# Patient Record
Sex: MALE | Employment: OTHER | URBAN - METROPOLITAN AREA
[De-identification: names, ages, dates, MRNs, and addresses within clinical notes are randomized per-mention and may not be internally consistent; named-entity substitution may affect disease eponyms.]

---

## 2019-07-03 ENCOUNTER — HOSPITAL ENCOUNTER (OUTPATIENT)
Age: 84
Setting detail: SPECIMEN
Discharge: HOME | End: 2019-07-03

## 2019-07-03 LAB
ANION GAP SERPL CALCULATED.3IONS-SCNC: 11 MMOL/L (ref 9–17)
BUN BLDV-MCNC: 32 MG/DL (ref 8–23)
BUN/CREAT BLD: ABNORMAL (ref 9–20)
CALCIUM SERPL-MCNC: 8.2 MG/DL (ref 8.6–10.4)
CHLORIDE BLD-SCNC: 100 MMOL/L (ref 98–107)
CO2: 22 MMOL/L (ref 20–31)
CREAT SERPL-MCNC: 2.15 MG/DL (ref 0.7–1.2)
GFR AFRICAN AMERICAN: 36 ML/MIN
GFR NON-AFRICAN AMERICAN: 30 ML/MIN
GFR SERPL CREATININE-BSD FRML MDRD: ABNORMAL ML/MIN/{1.73_M2}
GFR SERPL CREATININE-BSD FRML MDRD: ABNORMAL ML/MIN/{1.73_M2}
GLUCOSE BLD-MCNC: 81 MG/DL (ref 70–99)
HCT VFR BLD CALC: 26.5 % (ref 40.7–50.3)
HEMOGLOBIN: 8.4 G/DL (ref 13–17)
MAGNESIUM: 2 MG/DL (ref 1.6–2.6)
MCH RBC QN AUTO: 30.1 PG (ref 25.2–33.5)
MCHC RBC AUTO-ENTMCNC: 31.7 G/DL (ref 28.4–34.8)
MCV RBC AUTO: 95 FL (ref 82.6–102.9)
NRBC AUTOMATED: 0 PER 100 WBC
PDW BLD-RTO: 15.7 % (ref 11.8–14.4)
PHOSPHORUS: 4.7 MG/DL (ref 2.5–4.5)
PLATELET # BLD: 311 K/UL (ref 138–453)
PMV BLD AUTO: 9 FL (ref 8.1–13.5)
POTASSIUM SERPL-SCNC: 5.1 MMOL/L (ref 3.7–5.3)
RBC # BLD: 2.79 M/UL (ref 4.21–5.77)
SODIUM BLD-SCNC: 133 MMOL/L (ref 135–144)
WBC # BLD: 5.7 K/UL (ref 3.5–11.3)

## 2019-07-03 PROCEDURE — 85027 COMPLETE CBC AUTOMATED: CPT

## 2019-07-03 PROCEDURE — 80048 BASIC METABOLIC PNL TOTAL CA: CPT

## 2019-07-03 PROCEDURE — 83735 ASSAY OF MAGNESIUM: CPT

## 2019-07-03 PROCEDURE — 84100 ASSAY OF PHOSPHORUS: CPT

## 2019-07-03 PROCEDURE — 36415 COLL VENOUS BLD VENIPUNCTURE: CPT

## 2019-07-03 PROCEDURE — P9603 ONE-WAY ALLOW PRORATED MILES: HCPCS

## 2019-07-17 ENCOUNTER — HOSPITAL ENCOUNTER (OUTPATIENT)
Age: 84
Setting detail: SPECIMEN
Discharge: HOME | End: 2019-07-17

## 2019-07-17 LAB
ANION GAP SERPL CALCULATED.3IONS-SCNC: 13 MMOL/L (ref 9–17)
BUN BLDV-MCNC: 15 MG/DL (ref 8–23)
BUN/CREAT BLD: ABNORMAL (ref 9–20)
CALCIUM SERPL-MCNC: 7.8 MG/DL (ref 8.6–10.4)
CHLORIDE BLD-SCNC: 99 MMOL/L (ref 98–107)
CO2: 28 MMOL/L (ref 20–31)
CREAT SERPL-MCNC: 1.47 MG/DL (ref 0.7–1.2)
GFR AFRICAN AMERICAN: 55 ML/MIN
GFR NON-AFRICAN AMERICAN: 46 ML/MIN
GFR SERPL CREATININE-BSD FRML MDRD: ABNORMAL ML/MIN/{1.73_M2}
GFR SERPL CREATININE-BSD FRML MDRD: ABNORMAL ML/MIN/{1.73_M2}
GLUCOSE BLD-MCNC: 89 MG/DL (ref 70–99)
HCT VFR BLD CALC: 24.8 % (ref 40.7–50.3)
HEMOGLOBIN: 7.5 G/DL (ref 13–17)
MCH RBC QN AUTO: 30.4 PG (ref 25.2–33.5)
MCHC RBC AUTO-ENTMCNC: 30.2 G/DL (ref 28.4–34.8)
MCV RBC AUTO: 100.4 FL (ref 82.6–102.9)
NRBC AUTOMATED: 0 PER 100 WBC
PDW BLD-RTO: 18.7 % (ref 11.8–14.4)
PLATELET # BLD: 164 K/UL (ref 138–453)
PMV BLD AUTO: 9.1 FL (ref 8.1–13.5)
POTASSIUM SERPL-SCNC: 3.9 MMOL/L (ref 3.7–5.3)
RBC # BLD: 2.47 M/UL (ref 4.21–5.77)
SODIUM BLD-SCNC: 140 MMOL/L (ref 135–144)
WBC # BLD: 5.2 K/UL (ref 3.5–11.3)

## 2019-07-17 PROCEDURE — P9603 ONE-WAY ALLOW PRORATED MILES: HCPCS

## 2019-07-17 PROCEDURE — 36415 COLL VENOUS BLD VENIPUNCTURE: CPT

## 2019-07-17 PROCEDURE — 80048 BASIC METABOLIC PNL TOTAL CA: CPT

## 2019-07-17 PROCEDURE — 85027 COMPLETE CBC AUTOMATED: CPT

## 2019-07-23 ENCOUNTER — HOSPITAL ENCOUNTER (OUTPATIENT)
Age: 84
Setting detail: SPECIMEN
Discharge: HOME | End: 2019-07-23

## 2019-07-23 LAB
ANION GAP SERPL CALCULATED.3IONS-SCNC: 8 MMOL/L (ref 9–17)
BUN BLDV-MCNC: 12 MG/DL (ref 8–23)
BUN/CREAT BLD: ABNORMAL (ref 9–20)
CALCIUM SERPL-MCNC: 7.7 MG/DL (ref 8.6–10.4)
CHLORIDE BLD-SCNC: 96 MMOL/L (ref 98–107)
CO2: 30 MMOL/L (ref 20–31)
CREAT SERPL-MCNC: 1.3 MG/DL (ref 0.7–1.2)
GFR AFRICAN AMERICAN: >60 ML/MIN
GFR NON-AFRICAN AMERICAN: 53 ML/MIN
GFR SERPL CREATININE-BSD FRML MDRD: ABNORMAL ML/MIN/{1.73_M2}
GFR SERPL CREATININE-BSD FRML MDRD: ABNORMAL ML/MIN/{1.73_M2}
GLUCOSE BLD-MCNC: 76 MG/DL (ref 70–99)
HCT VFR BLD CALC: 25.1 % (ref 40.7–50.3)
HEMOGLOBIN: 7.4 G/DL (ref 13–17)
MAGNESIUM: 1.3 MG/DL (ref 1.6–2.6)
MCH RBC QN AUTO: 30.3 PG (ref 25.2–33.5)
MCHC RBC AUTO-ENTMCNC: 29.5 G/DL (ref 28.4–34.8)
MCV RBC AUTO: 102.9 FL (ref 82.6–102.9)
NRBC AUTOMATED: 0 PER 100 WBC
PDW BLD-RTO: 18.5 % (ref 11.8–14.4)
PLATELET # BLD: 136 K/UL (ref 138–453)
PMV BLD AUTO: 9.8 FL (ref 8.1–13.5)
POTASSIUM SERPL-SCNC: 3.7 MMOL/L (ref 3.7–5.3)
RBC # BLD: 2.44 M/UL (ref 4.21–5.77)
SODIUM BLD-SCNC: 134 MMOL/L (ref 135–144)
WBC # BLD: 3.6 K/UL (ref 3.5–11.3)

## 2019-07-23 PROCEDURE — 85027 COMPLETE CBC AUTOMATED: CPT

## 2019-07-23 PROCEDURE — 36415 COLL VENOUS BLD VENIPUNCTURE: CPT

## 2019-07-23 PROCEDURE — 83735 ASSAY OF MAGNESIUM: CPT

## 2019-07-23 PROCEDURE — 80048 BASIC METABOLIC PNL TOTAL CA: CPT

## 2019-07-23 PROCEDURE — P9603 ONE-WAY ALLOW PRORATED MILES: HCPCS

## 2019-07-27 PROCEDURE — 87086 URINE CULTURE/COLONY COUNT: CPT

## 2019-07-27 PROCEDURE — 81001 URINALYSIS AUTO W/SCOPE: CPT

## 2019-07-28 ENCOUNTER — HOSPITAL ENCOUNTER (OUTPATIENT)
Age: 84
Setting detail: SPECIMEN
Discharge: HOME | End: 2019-07-28

## 2019-07-28 LAB
-: NORMAL
AMORPHOUS: NORMAL
BACTERIA: NORMAL
BILIRUBIN URINE: NEGATIVE
CASTS UA: NORMAL /LPF (ref 0–8)
COLOR: YELLOW
COMMENT UA: ABNORMAL
CRYSTALS, UA: NORMAL /HPF
EPITHELIAL CELLS UA: NORMAL /HPF (ref 0–5)
GLUCOSE URINE: NEGATIVE
KETONES, URINE: NEGATIVE
LEUKOCYTE ESTERASE, URINE: NEGATIVE
MUCUS: NORMAL
NITRITE, URINE: NEGATIVE
OTHER OBSERVATIONS UA: NORMAL
PH UA: 7 (ref 5–8)
PROTEIN UA: ABNORMAL
RBC UA: NORMAL /HPF (ref 0–4)
RENAL EPITHELIAL, UA: NORMAL /HPF
SPECIFIC GRAVITY UA: 1.01 (ref 1–1.03)
TRICHOMONAS: NORMAL
TURBIDITY: CLEAR
URINE HGB: ABNORMAL
UROBILINOGEN, URINE: NORMAL
WBC UA: NORMAL /HPF (ref 0–5)
YEAST: NORMAL

## 2019-07-29 ENCOUNTER — HOSPITAL ENCOUNTER (OUTPATIENT)
Age: 84
Setting detail: SPECIMEN
Discharge: HOME | End: 2019-07-29

## 2019-07-29 LAB
ANION GAP SERPL CALCULATED.3IONS-SCNC: 8 MMOL/L (ref 9–17)
BUN BLDV-MCNC: 14 MG/DL (ref 8–23)
BUN/CREAT BLD: ABNORMAL (ref 9–20)
CALCIUM SERPL-MCNC: 8.1 MG/DL (ref 8.6–10.4)
CHLORIDE BLD-SCNC: 100 MMOL/L (ref 98–107)
CO2: 32 MMOL/L (ref 20–31)
CREAT SERPL-MCNC: 1.45 MG/DL (ref 0.7–1.2)
CULTURE: NO GROWTH
GFR AFRICAN AMERICAN: 56 ML/MIN
GFR NON-AFRICAN AMERICAN: 46 ML/MIN
GFR SERPL CREATININE-BSD FRML MDRD: ABNORMAL ML/MIN/{1.73_M2}
GFR SERPL CREATININE-BSD FRML MDRD: ABNORMAL ML/MIN/{1.73_M2}
GLUCOSE BLD-MCNC: 83 MG/DL (ref 70–99)
HCT VFR BLD CALC: 29 % (ref 40.7–50.3)
HEMOGLOBIN: 8.8 G/DL (ref 13–17)
Lab: NORMAL
MAGNESIUM: 1.5 MG/DL (ref 1.6–2.6)
MCH RBC QN AUTO: 30.8 PG (ref 25.2–33.5)
MCHC RBC AUTO-ENTMCNC: 30.3 G/DL (ref 28.4–34.8)
MCV RBC AUTO: 101.4 FL (ref 82.6–102.9)
NRBC AUTOMATED: 0 PER 100 WBC
PDW BLD-RTO: 16.7 % (ref 11.8–14.4)
PLATELET # BLD: 180 K/UL (ref 138–453)
PMV BLD AUTO: 9.4 FL (ref 8.1–13.5)
POTASSIUM SERPL-SCNC: 4 MMOL/L (ref 3.7–5.3)
RBC # BLD: 2.86 M/UL (ref 4.21–5.77)
SODIUM BLD-SCNC: 140 MMOL/L (ref 135–144)
SPECIMEN DESCRIPTION: NORMAL
WBC # BLD: 4 K/UL (ref 3.5–11.3)

## 2019-07-29 PROCEDURE — 80048 BASIC METABOLIC PNL TOTAL CA: CPT

## 2019-07-29 PROCEDURE — P9603 ONE-WAY ALLOW PRORATED MILES: HCPCS

## 2019-07-29 PROCEDURE — 36415 COLL VENOUS BLD VENIPUNCTURE: CPT

## 2019-07-29 PROCEDURE — 85027 COMPLETE CBC AUTOMATED: CPT

## 2019-07-29 PROCEDURE — 83735 ASSAY OF MAGNESIUM: CPT

## 2019-07-31 ENCOUNTER — HOSPITAL ENCOUNTER (OUTPATIENT)
Age: 84
Setting detail: SPECIMEN
Discharge: HOME | End: 2019-07-31

## 2019-07-31 LAB
-: NORMAL
AMORPHOUS: NORMAL
BACTERIA: NORMAL
BILIRUBIN URINE: NEGATIVE
CASTS UA: NORMAL /LPF (ref 0–8)
COLOR: YELLOW
COMMENT UA: ABNORMAL
CREATININE URINE: 34.9 MG/DL (ref 39–259)
CRYSTALS, UA: NORMAL /HPF
EPITHELIAL CELLS UA: NORMAL /HPF (ref 0–5)
GLUCOSE URINE: NEGATIVE
KETONES, URINE: NEGATIVE
LEUKOCYTE ESTERASE, URINE: NEGATIVE
MUCUS: NORMAL
NITRITE, URINE: NEGATIVE
OTHER OBSERVATIONS UA: NORMAL
PH UA: 7.5 (ref 5–8)
PROTEIN UA: NEGATIVE
RBC UA: NORMAL /HPF (ref 0–4)
RENAL EPITHELIAL, UA: NORMAL /HPF
SPECIFIC GRAVITY UA: 1.01 (ref 1–1.03)
TOTAL PROTEIN, URINE: 74 MG/DL
TRICHOMONAS: NORMAL
TURBIDITY: CLEAR
URINE HGB: ABNORMAL
UROBILINOGEN, URINE: NORMAL
WBC UA: NORMAL /HPF (ref 0–5)
YEAST: NORMAL

## 2019-07-31 PROCEDURE — 82570 ASSAY OF URINE CREATININE: CPT

## 2019-07-31 PROCEDURE — 84156 ASSAY OF PROTEIN URINE: CPT

## 2019-07-31 PROCEDURE — 81001 URINALYSIS AUTO W/SCOPE: CPT

## 2019-08-06 ENCOUNTER — HOSPITAL ENCOUNTER (OUTPATIENT)
Age: 84
Setting detail: SPECIMEN
Discharge: HOME | End: 2019-08-06

## 2019-08-06 LAB — MAGNESIUM: 1.8 MG/DL (ref 1.6–2.6)

## 2019-08-06 PROCEDURE — 83735 ASSAY OF MAGNESIUM: CPT

## 2019-08-06 PROCEDURE — 36415 COLL VENOUS BLD VENIPUNCTURE: CPT

## 2019-08-06 PROCEDURE — P9603 ONE-WAY ALLOW PRORATED MILES: HCPCS

## 2022-09-08 ENCOUNTER — APPOINTMENT (OUTPATIENT)
Dept: GENERAL RADIOLOGY | Age: 87
DRG: 871 | End: 2022-09-08
Payer: MEDICARE

## 2022-09-08 ENCOUNTER — APPOINTMENT (OUTPATIENT)
Dept: CT IMAGING | Age: 87
DRG: 871 | End: 2022-09-08
Payer: MEDICARE

## 2022-09-08 ENCOUNTER — APPOINTMENT (OUTPATIENT)
Dept: ULTRASOUND IMAGING | Age: 87
DRG: 871 | End: 2022-09-08
Payer: MEDICARE

## 2022-09-08 ENCOUNTER — HOSPITAL ENCOUNTER (INPATIENT)
Age: 87
LOS: 7 days | Discharge: NURSING, REHAB OR LONG-TERM CARE | DRG: 871 | End: 2022-09-15
Attending: EMERGENCY MEDICINE | Admitting: INTERNAL MEDICINE
Payer: MEDICARE

## 2022-09-08 DIAGNOSIS — N18.9 ACUTE KIDNEY INJURY SUPERIMPOSED ON CKD (HCC): ICD-10-CM

## 2022-09-08 DIAGNOSIS — N30.01 ACUTE CYSTITIS WITH HEMATURIA: ICD-10-CM

## 2022-09-08 DIAGNOSIS — R65.21 SEPTIC SHOCK (HCC): ICD-10-CM

## 2022-09-08 DIAGNOSIS — K80.20 CALCULUS OF GALLBLADDER WITHOUT CHOLECYSTITIS WITHOUT OBSTRUCTION: ICD-10-CM

## 2022-09-08 DIAGNOSIS — E87.20 LACTIC ACIDOSIS: ICD-10-CM

## 2022-09-08 DIAGNOSIS — E87.6 HYPOKALEMIA: ICD-10-CM

## 2022-09-08 DIAGNOSIS — K62.89 PROCTITIS: ICD-10-CM

## 2022-09-08 DIAGNOSIS — K52.9 COLITIS: ICD-10-CM

## 2022-09-08 DIAGNOSIS — E83.42 HYPOMAGNESEMIA: ICD-10-CM

## 2022-09-08 DIAGNOSIS — R41.82 ALTERED MENTAL STATUS, UNSPECIFIED ALTERED MENTAL STATUS TYPE: Primary | ICD-10-CM

## 2022-09-08 DIAGNOSIS — E16.2 HYPOGLYCEMIA: ICD-10-CM

## 2022-09-08 DIAGNOSIS — E87.1 HYPONATREMIA: ICD-10-CM

## 2022-09-08 DIAGNOSIS — E83.51 HYPOCALCEMIA: ICD-10-CM

## 2022-09-08 DIAGNOSIS — A41.9 SEPTIC SHOCK (HCC): ICD-10-CM

## 2022-09-08 DIAGNOSIS — N17.9 ACUTE KIDNEY INJURY SUPERIMPOSED ON CKD (HCC): ICD-10-CM

## 2022-09-08 PROBLEM — I95.9 HYPOTENSION: Status: ACTIVE | Noted: 2022-09-08

## 2022-09-08 PROBLEM — N30.90 CYSTITIS: Status: ACTIVE | Noted: 2022-09-08

## 2022-09-08 PROBLEM — D72.829 LEUKOCYTOSIS: Status: ACTIVE | Noted: 2022-09-08

## 2022-09-08 PROBLEM — D69.6 THROMBOCYTOPENIA (HCC): Status: ACTIVE | Noted: 2022-09-08

## 2022-09-08 LAB
ABSOLUTE EOS #: 0 K/UL (ref 0–0.44)
ABSOLUTE IMMATURE GRANULOCYTE: 0.99 K/UL (ref 0–0.3)
ABSOLUTE LYMPH #: 0.49 K/UL (ref 1.1–3.7)
ABSOLUTE MONO #: 1.48 K/UL (ref 0.1–1.2)
ACETAMINOPHEN LEVEL: <10 UG/ML (ref 10–30)
ALBUMIN SERPL-MCNC: 1.8 G/DL (ref 3.5–5.2)
ALP BLD-CCNC: 188 U/L (ref 40–129)
ALT SERPL-CCNC: 24 U/L (ref 5–41)
AMMONIA: 60 UMOL/L (ref 16–60)
AMPHETAMINE SCREEN URINE: NEGATIVE
ANION GAP SERPL CALCULATED.3IONS-SCNC: 15 MMOL/L (ref 9–17)
AST SERPL-CCNC: 47 U/L
BACTERIA: ABNORMAL
BARBITURATE SCREEN URINE: NEGATIVE
BASOPHILS # BLD: 0 % (ref 0–2)
BASOPHILS ABSOLUTE: 0 K/UL (ref 0–0.2)
BENZODIAZEPINE SCREEN, URINE: NEGATIVE
BILIRUB SERPL-MCNC: 4.9 MG/DL (ref 0.3–1.2)
BILIRUBIN URINE: ABNORMAL
BUN BLDV-MCNC: 70 MG/DL (ref 8–23)
BUN/CREAT BLD: 16 (ref 9–20)
CALCIUM SERPL-MCNC: 7.6 MG/DL (ref 8.6–10.4)
CANNABINOID SCREEN URINE: NEGATIVE
CHLORIDE BLD-SCNC: 95 MMOL/L (ref 98–107)
CO2: 17 MMOL/L (ref 20–31)
COCAINE METABOLITE, URINE: NEGATIVE
COLOR: ABNORMAL
CREAT SERPL-MCNC: 4.34 MG/DL (ref 0.7–1.2)
CREATININE URINE: 108.7 MG/DL (ref 39–259)
EOSINOPHIL,URINE: NORMAL
EOSINOPHILS RELATIVE PERCENT: 0 % (ref 1–4)
EPITHELIAL CELLS UA: ABNORMAL /HPF (ref 0–5)
ETHANOL PERCENT: <0.01 %
ETHANOL: <10 MG/DL
FENTANYL URINE: NEGATIVE
GFR AFRICAN AMERICAN: 16 ML/MIN
GFR NON-AFRICAN AMERICAN: 13 ML/MIN
GFR SERPL CREATININE-BSD FRML MDRD: ABNORMAL ML/MIN/{1.73_M2}
GLUCOSE BLD-MCNC: 126 MG/DL (ref 75–110)
GLUCOSE BLD-MCNC: 140 MG/DL (ref 75–110)
GLUCOSE BLD-MCNC: 62 MG/DL (ref 70–99)
GLUCOSE BLD-MCNC: 64 MG/DL (ref 75–110)
GLUCOSE URINE: NEGATIVE
HCO3 VENOUS: 16.8 MMOL/L (ref 22–29)
HCT VFR BLD CALC: 29.6 % (ref 40.7–50.3)
HEMOGLOBIN: 10.2 G/DL (ref 13–17)
IMMATURE GRANULOCYTES: 2 %
KETONES, URINE: ABNORMAL
LACTIC ACID, SEPSIS: 2.6 MMOL/L (ref 0.5–1.9)
LACTIC ACID, SEPSIS: 2.7 MMOL/L (ref 0.5–1.9)
LEUKOCYTE ESTERASE, URINE: ABNORMAL
LYMPHOCYTES # BLD: 1 % (ref 24–43)
MAGNESIUM: 1.3 MG/DL (ref 1.6–2.6)
MCH RBC QN AUTO: 31 PG (ref 25.2–33.5)
MCHC RBC AUTO-ENTMCNC: 34.5 G/DL (ref 28–38)
MCV RBC AUTO: 90 FL (ref 82.6–102.9)
METHADONE SCREEN, URINE: NEGATIVE
MONOCYTES # BLD: 3 % (ref 3–12)
MORPHOLOGY: ABNORMAL
MORPHOLOGY: ABNORMAL
NEGATIVE BASE EXCESS, VEN: 8 (ref 0–2)
NITRITE, URINE: NEGATIVE
O2 SAT, VEN: 96 % (ref 60–85)
OPIATES, URINE: NEGATIVE
OSMOLALITY URINE: 207 MOSM/KG (ref 80–1300)
OXYCODONE SCREEN URINE: NEGATIVE
PCO2, VEN: 32.9 MM HG (ref 41–51)
PDW BLD-RTO: 16.4 % (ref 11.8–14.4)
PH UA: 5.5 (ref 5–8)
PH VENOUS: 7.32 (ref 7.32–7.43)
PHENCYCLIDINE, URINE: NEGATIVE
PLATELET # BLD: 96 K/UL (ref 138–453)
PMV BLD AUTO: 10.3 FL (ref 8.1–13.5)
PO2, VEN: 84.9 MM HG (ref 30–50)
POTASSIUM SERPL-SCNC: 3.4 MMOL/L (ref 3.7–5.3)
PROTEIN UA: ABNORMAL
RBC # BLD: 3.29 M/UL (ref 4.21–5.77)
RBC UA: ABNORMAL /HPF (ref 0–2)
SALICYLATE LEVEL: <1 MG/DL (ref 3–10)
SEG NEUTROPHILS: 94 % (ref 36–65)
SEGMENTED NEUTROPHILS ABSOLUTE COUNT: 46.34 K/UL (ref 1.5–8.1)
SODIUM BLD-SCNC: 127 MMOL/L (ref 135–144)
SODIUM,UR: <20 MMOL/L
SODIUM,UR: <20 MMOL/L
SPECIFIC GRAVITY UA: 1.01 (ref 1–1.03)
TEST INFORMATION: NORMAL
TOTAL CK: 152 U/L (ref 39–308)
TOTAL PROTEIN, URINE: 53 MG/DL
TOTAL PROTEIN: 5.6 G/DL (ref 6.4–8.3)
TOXIC TRICYCLIC SC,BLOOD: NEGATIVE
TROPONIN, HIGH SENSITIVITY: 58 NG/L (ref 0–22)
TROPONIN, HIGH SENSITIVITY: 60 NG/L (ref 0–22)
TSH SERPL DL<=0.05 MIU/L-ACNC: 2.98 UIU/ML (ref 0.3–5)
TURBIDITY: ABNORMAL
URINE HGB: ABNORMAL
UROBILINOGEN, URINE: NORMAL
WBC # BLD: 49.3 K/UL (ref 3.5–11.3)
WBC UA: ABNORMAL /HPF (ref 0–5)

## 2022-09-08 PROCEDURE — 82140 ASSAY OF AMMONIA: CPT

## 2022-09-08 PROCEDURE — 87186 SC STD MICRODIL/AGAR DIL: CPT

## 2022-09-08 PROCEDURE — 93005 ELECTROCARDIOGRAM TRACING: CPT | Performed by: EMERGENCY MEDICINE

## 2022-09-08 PROCEDURE — 82947 ASSAY GLUCOSE BLOOD QUANT: CPT

## 2022-09-08 PROCEDURE — 94761 N-INVAS EAR/PLS OXIMETRY MLT: CPT

## 2022-09-08 PROCEDURE — 96376 TX/PRO/DX INJ SAME DRUG ADON: CPT

## 2022-09-08 PROCEDURE — APPSS45 APP SPLIT SHARED TIME 31-45 MINUTES: Performed by: NURSE PRACTITIONER

## 2022-09-08 PROCEDURE — 82550 ASSAY OF CK (CPK): CPT

## 2022-09-08 PROCEDURE — 82803 BLOOD GASES ANY COMBINATION: CPT

## 2022-09-08 PROCEDURE — 74176 CT ABD & PELVIS W/O CONTRAST: CPT

## 2022-09-08 PROCEDURE — 99285 EMERGENCY DEPT VISIT HI MDM: CPT

## 2022-09-08 PROCEDURE — 80053 COMPREHEN METABOLIC PANEL: CPT

## 2022-09-08 PROCEDURE — 2500000003 HC RX 250 WO HCPCS: Performed by: EMERGENCY MEDICINE

## 2022-09-08 PROCEDURE — 84443 ASSAY THYROID STIM HORMONE: CPT

## 2022-09-08 PROCEDURE — 83935 ASSAY OF URINE OSMOLALITY: CPT

## 2022-09-08 PROCEDURE — 51702 INSERT TEMP BLADDER CATH: CPT

## 2022-09-08 PROCEDURE — 51701 INSERT BLADDER CATHETER: CPT

## 2022-09-08 PROCEDURE — 84300 ASSAY OF URINE SODIUM: CPT

## 2022-09-08 PROCEDURE — 96368 THER/DIAG CONCURRENT INF: CPT

## 2022-09-08 PROCEDURE — 83735 ASSAY OF MAGNESIUM: CPT

## 2022-09-08 PROCEDURE — 2580000003 HC RX 258: Performed by: EMERGENCY MEDICINE

## 2022-09-08 PROCEDURE — 87205 SMEAR GRAM STAIN: CPT

## 2022-09-08 PROCEDURE — 87040 BLOOD CULTURE FOR BACTERIA: CPT

## 2022-09-08 PROCEDURE — 83605 ASSAY OF LACTIC ACID: CPT

## 2022-09-08 PROCEDURE — 2000000000 HC ICU R&B

## 2022-09-08 PROCEDURE — 96367 TX/PROPH/DG ADDL SEQ IV INF: CPT

## 2022-09-08 PROCEDURE — G0480 DRUG TEST DEF 1-7 CLASSES: HCPCS

## 2022-09-08 PROCEDURE — 6360000002 HC RX W HCPCS: Performed by: EMERGENCY MEDICINE

## 2022-09-08 PROCEDURE — 80307 DRUG TEST PRSMV CHEM ANLYZR: CPT

## 2022-09-08 PROCEDURE — 96365 THER/PROPH/DIAG IV INF INIT: CPT

## 2022-09-08 PROCEDURE — 76770 US EXAM ABDO BACK WALL COMP: CPT

## 2022-09-08 PROCEDURE — 80143 DRUG ASSAY ACETAMINOPHEN: CPT

## 2022-09-08 PROCEDURE — 2580000003 HC RX 258: Performed by: NURSE PRACTITIONER

## 2022-09-08 PROCEDURE — 6360000002 HC RX W HCPCS: Performed by: NURSE PRACTITIONER

## 2022-09-08 PROCEDURE — 70450 CT HEAD/BRAIN W/O DYE: CPT

## 2022-09-08 PROCEDURE — 87088 URINE BACTERIA CULTURE: CPT

## 2022-09-08 PROCEDURE — 2500000003 HC RX 250 WO HCPCS: Performed by: NURSE PRACTITIONER

## 2022-09-08 PROCEDURE — 87154 CUL TYP ID BLD PTHGN 6+ TRGT: CPT

## 2022-09-08 PROCEDURE — 80179 DRUG ASSAY SALICYLATE: CPT

## 2022-09-08 PROCEDURE — 96366 THER/PROPH/DIAG IV INF ADDON: CPT

## 2022-09-08 PROCEDURE — 84484 ASSAY OF TROPONIN QUANT: CPT

## 2022-09-08 PROCEDURE — 82570 ASSAY OF URINE CREATININE: CPT

## 2022-09-08 PROCEDURE — 71045 X-RAY EXAM CHEST 1 VIEW: CPT

## 2022-09-08 PROCEDURE — 87086 URINE CULTURE/COLONY COUNT: CPT

## 2022-09-08 PROCEDURE — 87077 CULTURE AEROBIC IDENTIFY: CPT

## 2022-09-08 PROCEDURE — 81001 URINALYSIS AUTO W/SCOPE: CPT

## 2022-09-08 PROCEDURE — 85025 COMPLETE CBC W/AUTO DIFF WBC: CPT

## 2022-09-08 PROCEDURE — 84156 ASSAY OF PROTEIN URINE: CPT

## 2022-09-08 RX ORDER — SODIUM CHLORIDE 0.9 % (FLUSH) 0.9 %
5-40 SYRINGE (ML) INJECTION EVERY 12 HOURS SCHEDULED
Status: DISCONTINUED | OUTPATIENT
Start: 2022-09-08 | End: 2022-09-15 | Stop reason: HOSPADM

## 2022-09-08 RX ORDER — ONDANSETRON 2 MG/ML
4 INJECTION INTRAMUSCULAR; INTRAVENOUS EVERY 6 HOURS PRN
Status: DISCONTINUED | OUTPATIENT
Start: 2022-09-08 | End: 2022-09-15 | Stop reason: HOSPADM

## 2022-09-08 RX ORDER — SODIUM CHLORIDE 9 MG/ML
INJECTION, SOLUTION INTRAVENOUS PRN
Status: DISCONTINUED | OUTPATIENT
Start: 2022-09-08 | End: 2022-09-15 | Stop reason: HOSPADM

## 2022-09-08 RX ORDER — MAGNESIUM SULFATE IN WATER 40 MG/ML
2000 INJECTION, SOLUTION INTRAVENOUS ONCE
Status: COMPLETED | OUTPATIENT
Start: 2022-09-08 | End: 2022-09-08

## 2022-09-08 RX ORDER — DEXTROSE MONOHYDRATE 25 G/50ML
25 INJECTION, SOLUTION INTRAVENOUS ONCE
Status: COMPLETED | OUTPATIENT
Start: 2022-09-08 | End: 2022-09-08

## 2022-09-08 RX ORDER — ACETAMINOPHEN 325 MG/1
650 TABLET ORAL EVERY 6 HOURS PRN
Status: DISCONTINUED | OUTPATIENT
Start: 2022-09-08 | End: 2022-09-15 | Stop reason: HOSPADM

## 2022-09-08 RX ORDER — POTASSIUM CHLORIDE 7.45 MG/ML
10 INJECTION INTRAVENOUS
Status: COMPLETED | OUTPATIENT
Start: 2022-09-08 | End: 2022-09-08

## 2022-09-08 RX ORDER — ACETAMINOPHEN 650 MG/1
650 SUPPOSITORY RECTAL EVERY 6 HOURS PRN
Status: DISCONTINUED | OUTPATIENT
Start: 2022-09-08 | End: 2022-09-15 | Stop reason: HOSPADM

## 2022-09-08 RX ORDER — DEXTROSE MONOHYDRATE 100 MG/ML
INJECTION, SOLUTION INTRAVENOUS CONTINUOUS PRN
Status: DISCONTINUED | OUTPATIENT
Start: 2022-09-08 | End: 2022-09-15 | Stop reason: HOSPADM

## 2022-09-08 RX ORDER — 0.9 % SODIUM CHLORIDE 0.9 %
1000 INTRAVENOUS SOLUTION INTRAVENOUS ONCE
Status: COMPLETED | OUTPATIENT
Start: 2022-09-08 | End: 2022-09-08

## 2022-09-08 RX ORDER — ONDANSETRON 4 MG/1
4 TABLET, ORALLY DISINTEGRATING ORAL EVERY 8 HOURS PRN
Status: DISCONTINUED | OUTPATIENT
Start: 2022-09-08 | End: 2022-09-15 | Stop reason: HOSPADM

## 2022-09-08 RX ORDER — SODIUM CHLORIDE 0.9 % (FLUSH) 0.9 %
5-40 SYRINGE (ML) INJECTION PRN
Status: DISCONTINUED | OUTPATIENT
Start: 2022-09-08 | End: 2022-09-15 | Stop reason: HOSPADM

## 2022-09-08 RX ORDER — CIPROFLOXACIN 2 MG/ML
400 INJECTION, SOLUTION INTRAVENOUS EVERY 12 HOURS
Status: DISCONTINUED | OUTPATIENT
Start: 2022-09-08 | End: 2022-09-09

## 2022-09-08 RX ORDER — POTASSIUM CHLORIDE 7.45 MG/ML
10 INJECTION INTRAVENOUS ONCE
Status: COMPLETED | OUTPATIENT
Start: 2022-09-08 | End: 2022-09-08

## 2022-09-08 RX ORDER — METRONIDAZOLE 500 MG/100ML
500 INJECTION, SOLUTION INTRAVENOUS EVERY 8 HOURS
Status: DISCONTINUED | OUTPATIENT
Start: 2022-09-08 | End: 2022-09-09

## 2022-09-08 RX ADMIN — CALCIUM GLUCONATE 1000 MG: 98 INJECTION, SOLUTION INTRAVENOUS at 14:33

## 2022-09-08 RX ADMIN — DEXTROSE MONOHYDRATE 25 G: 25 INJECTION, SOLUTION INTRAVENOUS at 13:03

## 2022-09-08 RX ADMIN — DEXTROSE MONOHYDRATE: 50 INJECTION, SOLUTION INTRAVENOUS at 14:34

## 2022-09-08 RX ADMIN — SODIUM BICARBONATE: 84 INJECTION, SOLUTION INTRAVENOUS at 16:39

## 2022-09-08 RX ADMIN — POTASSIUM CHLORIDE 10 MEQ: 10 INJECTION, SOLUTION INTRAVENOUS at 20:16

## 2022-09-08 RX ADMIN — METRONIDAZOLE 500 MG: 500 INJECTION, SOLUTION INTRAVENOUS at 20:21

## 2022-09-08 RX ADMIN — CIPROFLOXACIN 400 MG: 2 INJECTION, SOLUTION INTRAVENOUS at 22:42

## 2022-09-08 RX ADMIN — SODIUM CHLORIDE, PRESERVATIVE FREE 10 ML: 5 INJECTION INTRAVENOUS at 19:26

## 2022-09-08 RX ADMIN — SODIUM CHLORIDE: 9 INJECTION, SOLUTION INTRAVENOUS at 20:30

## 2022-09-08 RX ADMIN — POTASSIUM CHLORIDE 10 MEQ: 7.46 INJECTION, SOLUTION INTRAVENOUS at 14:35

## 2022-09-08 RX ADMIN — SODIUM CHLORIDE 1000 ML: 9 INJECTION, SOLUTION INTRAVENOUS at 16:50

## 2022-09-08 RX ADMIN — MAGNESIUM SULFATE HEPTAHYDRATE 2000 MG: 40 INJECTION, SOLUTION INTRAVENOUS at 14:37

## 2022-09-08 RX ADMIN — VANCOMYCIN HYDROCHLORIDE 2000 MG: 1 INJECTION, POWDER, LYOPHILIZED, FOR SOLUTION INTRAVENOUS at 16:49

## 2022-09-08 RX ADMIN — MAGNESIUM SULFATE HEPTAHYDRATE 2000 MG: 40 INJECTION, SOLUTION INTRAVENOUS at 20:30

## 2022-09-08 RX ADMIN — CEFTRIAXONE SODIUM 1000 MG: 1 INJECTION, POWDER, FOR SOLUTION INTRAMUSCULAR; INTRAVENOUS at 16:47

## 2022-09-08 RX ADMIN — POTASSIUM CHLORIDE 10 MEQ: 10 INJECTION, SOLUTION INTRAVENOUS at 21:25

## 2022-09-08 ASSESSMENT — PAIN - FUNCTIONAL ASSESSMENT: PAIN_FUNCTIONAL_ASSESSMENT: NONE - DENIES PAIN

## 2022-09-08 ASSESSMENT — ENCOUNTER SYMPTOMS
DIARRHEA: 0
VOMITING: 0
SHORTNESS OF BREATH: 0
ABDOMINAL PAIN: 0
NAUSEA: 0
COUGH: 0

## 2022-09-08 NOTE — PROGRESS NOTES
Pt arrived to unit from ED. Vitals taken. See Mar. Admission and assessment complete. No distress noted. pt placed on telemerty, call light given, pt oriented to room. Safety measures in place. POC initiated and reviewed with patient.

## 2022-09-08 NOTE — ED PROVIDER NOTES
[facility]  Emergency Department Encounter     Pt Name: Lindalee Skiff  MRN: [id number]  [place] 1935  Date of evaluation: 9/8/22  PCP:  No primary care provider on file. CHIEF COMPLAINT       Chief Complaint   Patient presents with    Altered Mental Status       HISTORY OF PRESENT ILLNESS  (Location/Symptom, Timing/Onset, Context/Setting, Quality, Duration, Modifying Factors, Severity.)    Lindalee Skiff is a 80 y.o. male who has a past medical history of diabetes who presents to the hospital via EMS for altered mental status. Patient was found to have a blood sugar in the 30s and was given amp of dextrose by EMS. Recheck in the emergency department was in the 60s requiring another dose. Patient is oriented to the fact that he is in the hospital and what month it is, however cannot answer any other questions for me. States that he lives at home with his wife and that is about it. Has no medical complaints and denies being in any pain. PAST MEDICAL / SURGICAL / SOCIAL / FAMILY HISTORY    has a past medical history of Diabetes mellitus ([address] has no past surgical history on file. Social History     Socioeconomic History    Marital status:       Spouse name: Not on file    Number of children: Not on file    Years of education: Not on file    Highest education level: Not on file   Occupational History    Not on file   Tobacco Use    Smoking status: Unknown    Smokeless tobacco: Not on file   Substance and Sexual Activity    Alcohol use: Not Currently    Drug use: Not on file    Sexual activity: Not on file   Other Topics Concern    Not on file   Social History Narrative    Not on file     Social Determinants of Health     Financial Resource Strain: Not on file   Food Insecurity: Not on file   Transportation Needs: Not on file   Physical Activity: Not on file   Stress: Not on file   Social Connections: Not on file   Intimate Partner Violence: Not on file   Housing Stability: Not on file       No family history on file. Allergies:    Cefazolin    Home Medications:  Prior to Admission medications    Not on File       REVIEW OF SYSTEMS    (2-9 systems for level 4, 10 or more for level 5)    Review of Systems   Unable to perform ROS: Mental status change     PHYSICAL EXAM   (up to 7 for level 4, 8 or more for level 5)    VITALS:   Vitals:    09/08/22 1243 09/08/22 1252 09/08/22 1554 09/08/22 1704   BP:  88/66  (!) 100/56   Pulse: 90  85 86   Resp: 20  18 24   Temp: 97.5 °F (36.4 °C)      TempSrc: Oral      SpO2: 95%  (!) 89% 98%   Weight: 200 lb (90.7 kg)          Physical Exam  Vitals and nursing note reviewed. Constitutional:       General: He is awake. He is not in acute distress. Appearance: He is well-developed and underweight. He is not diaphoretic. HENT:      Head: Normocephalic and atraumatic. Mouth/Throat:      Mouth: Mucous membranes are dry. Eyes:      General: Scleral icterus present. Conjunctiva/sclera: Conjunctivae normal.   Cardiovascular:      Rate and Rhythm: Normal rate and regular rhythm. Heart sounds: Normal heart sounds. Pulmonary:      Effort: Pulmonary effort is normal. No respiratory distress. Breath sounds: Normal breath sounds. No wheezing, rhonchi or rales. Abdominal:      General: There is no distension. Palpations: Abdomen is soft. Tenderness: There is no abdominal tenderness. There is no guarding or rebound. Musculoskeletal:         General: Normal range of motion. Cervical back: Normal range of motion. Right lower leg: No edema. Left lower leg: No edema. Skin:     General: Skin is warm and dry. Capillary Refill: Capillary refill takes 2 to 3 seconds. Findings: No rash. Comments: Poor skin turgor   Neurological:      Mental Status: He is confused. GCS: GCS eye subscore is 4. GCS verbal subscore is 4. GCS motor subscore is 5.    Psychiatric: Behavior: Behavior is slowed. Cognition and Memory: Cognition is impaired.        DIFFERENTIAL  DIAGNOSIS   PLAN (LABS / IMAGING / EKG):  Orders Placed This Encounter   Procedures    Culture, Blood 1    Culture, Blood 1    Culture, Urine    XR CHEST 1 VIEW    CT HEAD WO CONTRAST    CT CHEST ABDOMEN PELVIS WO CONTRAST    US RETROPERITONEAL COMPLETE    US GALLBLADDER RUQ    CBC with Auto Differential    Comprehensive Metabolic Panel w/ Reflex to MG    TSH w/reflex to FT4    TOX SCR, BLD, ED    Ammonia    Troponin    Urine Drug Screen    Urinalysis with Microscopic    Magnesium    Troponin    Lactate, Sepsis    SODIUM, URINE, RANDOM    CREATININE, RANDOM URINE    EOSINOPHILS, URINE    HYPOGLYCEMIA TREATMENT: blood glucose LESS THAN 70 mg/dL and patient ALERT and TOLERATING PO    HYPOGLYCEMIA TREATMENT: blood glucose LESS THAN 70 mg/dL and patient NOT ALERT or NPO    Telemetry monitoring - continuous duration    Straight cath    Insert indwelling urinary catheter    Pharmacy to Dose: Vancomycin    Inpatient consult to Nephrology    Inpatient consult to Nephrology    Inpatient consult to Hospitalist    Inpatient consult to Critical Care    POC Glucose Fingerstick    POCT Glucose    Venous Blood Gas, POC    EKG 12 Lead    Insert peripheral IV    ADMIT TO INPATIENT    Fall precautions       MEDICATIONS ORDERED:  Orders Placed This Encounter   Medications    dextrose 50 % IV solution    glucose chewable tablet 16 g    OR Linked Order Group     dextrose bolus 10% 125 mL     dextrose bolus 10% 250 mL    glucagon (rDNA) injection 1 mg    dextrose 10 % infusion    0.9 % sodium chloride bolus    DISCONTD: dextrose 5 % 1,000 mL infusion    calcium gluconate 1,000 mg in dextrose 5 % 100 mL IVPB    potassium chloride 10 mEq/100 mL IVPB (Peripheral Line)    magnesium sulfate 2000 mg in 50 mL IVPB premix    cefTRIAXone (ROCEPHIN) 1,000 mg in dextrose 5 % 50 mL IVPB mini-bag     Order Specific Question:   Antimicrobial Indications     Answer:   Sepsis of Unknown Etiology    vancomycin (VANCOCIN) 2,000 mg in dextrose 5 % 500 mL IVPB     Order Specific Question:   Antimicrobial Indications     Answer:   Sepsis of Unknown Etiology    sodium bicarbonate 75 mEq in dextrose 5 % and 0.45 % NaCl 1,000 mL infusion     DIAGNOSTIC RESULTS / EMERGENCYDEPARTMENT COURSE / MDM   LABS:  Labs Reviewed   CBC WITH AUTO DIFFERENTIAL - Abnormal; Notable for the following components:       Result Value    WBC 49.3 (*)     RBC 3.29 (*)     Hemoglobin 10.2 (*)     Hematocrit 29.6 (*)     RDW 16.4 (*)     Platelets 96 (*)     Seg Neutrophils 94 (*)     Lymphocytes 1 (*)     Eosinophils % 0 (*)     Immature Granulocytes 2 (*)     Segs Absolute 46.34 (*)     Absolute Lymph # 0.49 (*)     Absolute Mono # 1.48 (*)     Absolute Immature Granulocyte 0.99 (*)     All other components within normal limits   COMPREHENSIVE METABOLIC PANEL W/ REFLEX TO MG FOR LOW K - Abnormal; Notable for the following components:    Glucose 62 (*)     BUN 70 (*)     Creatinine 4.34 (*)     Calcium 7.6 (*)     Sodium 127 (*)     Potassium 3.4 (*)     Chloride 95 (*)     CO2 17 (*)     Alkaline Phosphatase 188 (*)     AST 47 (*)     Total Bilirubin 4.9 (*)     Total Protein 5.6 (*)     Albumin 1.8 (*)     GFR Non- 13 (*)     GFR  16 (*)     All other components within normal limits   TOX SCR, BLD, ED - Abnormal; Notable for the following components:    Acetaminophen Level <19 (*)     Salicylate Lvl <1 (*)     All other components within normal limits   TROPONIN - Abnormal; Notable for the following components:    Troponin, High Sensitivity 60 (*)     All other components within normal limits   URINALYSIS WITH MICROSCOPIC - Abnormal; Notable for the following components:    Color, UA Dark Yellow (*)     Turbidity UA Cloudy (*)     Bilirubin Urine   (*)     Value: Presumptive positive. Unable to confirm due to unavailability of reagent.     Ketones, Urine shift.  No abnormal extra-axial fluid collection. The gray-white differentiation is maintained without evidence of an acute infarct. There is no evidence of hydrocephalus. Some prominence of sulcation. Prominent CSF spaces at the vertex over the posterior frontal and parietal lobes bilaterally. ORBITS: The visualized portion of the orbits demonstrate no acute abnormality. SINUSES: The visualized paranasal sinuses and mastoid air cells demonstrate no acute abnormality. Findings suggest old right mastoiditis and or incomplete aeration of right mastoid air cells. SOFT TISSUES/SKULL:  No acute abnormality of the visualized skull or soft tissues. No acute intracranial abnormality. Prominent CSF spaces over the frontal and parietal lobes. No evidence of hydrocephalus. XR CHEST 1 VIEW    Result Date: 9/8/2022  EXAMINATION: ONE XRAY VIEW OF THE CHEST 9/8/2022 1:33 pm COMPARISON: None. HISTORY: ORDERING SYSTEM PROVIDED HISTORY: AMS TECHNOLOGIST PROVIDED HISTORY: AMS Reason for Exam: AMS FINDINGS: Suboptimal evaluation secondary to patient positioning/rotation. Cardiomediastinal silhouette appears within the upper limits of normal in size. No focal consolidation or overt pulmonary edema. Costophrenic angles are sharp. No evidence of pneumothorax. No acute osseous abnormalities. Suboptimal evaluation secondary to patient positioning/rotation. No radiographic evidence of an acute cardiopulmonary process. US RETROPERITONEAL COMPLETE    Result Date: 9/8/2022  EXAMINATION: RETROPERITONEAL ULTRASOUND OF THE KIDNEYS AND URINARY BLADDER 9/8/2022 COMPARISON: None HISTORY: ORDERING SYSTEM PROVIDED HISTORY: HINA on CKD hyponatremia TECHNOLOGIST PROVIDED HISTORY: HINA on CKD hyponatremia FINDINGS: Kidneys: The right kidney measures 8.2 cm in length and the left kidney measures 10 cm in length. Kidneys demonstrate normal cortical echogenicity. No evidence of hydronephrosis or intrarenal stones.  Bladder: Unremarkable appearance of the bladder. No significant post void residual.     Unremarkable ultrasound of the kidneys and urinary bladder. CT CHEST ABDOMEN PELVIS WO CONTRAST    Result Date: 9/8/2022  EXAMINATION: CT OF THE CHEST, ABDOMEN, AND PELVIS WITHOUT CONTRAST 9/8/2022 2:41 pm TECHNIQUE: CT of the chest, abdomen and pelvis was performed without the administration of intravenous contrast. Multiplanar reformatted images are provided for review. Automated exposure control, iterative reconstruction, and/or weight based adjustment of the mA/kV was utilized to reduce the radiation dose to as low as reasonably achievable. COMPARISON: None HISTORY: ORDERING SYSTEM PROVIDED HISTORY: AMS, WBC almost 50, acute renal failure TECHNOLOGIST PROVIDED HISTORY: AMS, WBC almost 50, acute renal failure Decision Support Exception - unselect if not a suspected or confirmed emergency medical condition->Emergency Medical Condition (MA) Reason for Exam: AMS, back pain, WBC 50, acute renal failure FINDINGS: Chest: Suboptimal evaluation secondary to motion degradation. Chest Wall and Thoracic Inlet: No axillary or supraclavicular lymphadenopathy. The thyroid gland appears within normal limits. Mediastinum and Tali: Hypodense blood pool. No mediastinal or hilar lymphadenopathy. Normal caliber of the thoracic aorta. The heart is normal in size. No pericardial effusion. Lungs: Mild bilateral lower lobe patchy opacities are favored to represent atelectasis. However, an early inflammatory/infectious process is not excluded in the proper clinical setting. Pleura: No pleural effusion. No pneumothorax. Bones: No suspicious osseous findings. Ossification of the anterior longitudinal ligament may be related to diffuse idiopathic skeletal hyperostosis (DISH). Abdomen/Pelvis: Organs: There is no acute abnormality of the liver, pancreas, spleen, adrenals, or kidneys. Suspected cholelithiasis.   Suboptimal evaluation of the gallbladder fossa.  Bilateral perinephric fat stranding present. GI/Bowel: Postsurgical changes compatible with gastric bypass. Bowel caliber is normal.  Diffuse wall thickening of the ascending and transverse colon may represent colitis. Questionable rectal wall thickening. There is no evidence of acute appendicitis. Pelvis: Mild bladder wall thickening. Peritoneum/Retroperitoneum: There is no free air. Small amount of free fluid present around the liver and spleen. No pathologically enlarged lymph nodes identified. Scattered atherosclerotic calcifications. Bones/Soft Tissues: No suspicious osseous findings. Diffuse subcutaneous fat stranding may related to fluid overload. Small fat containing umbilical hernia. Multilevel lumbar spondylosis. 1. Diffuse wall thickening of the ascending and transverse colon may represent colitis. 2. Questionable rectal wall thickening may represent proctitis. 3. Suspected cholelithiasis. Suboptimal evaluation of gallbladder fossa. Can correlate with right upper quadrant ultrasound. 4. Mild bladder wall thickening may be related to underdistension versus possible cystitis. Can correlate with urinalysis. 5. Small amount of free fluid present around the liver and spleen. 6. Mild bilateral lower lobe patchy opacities are favored to represent atelectasis. However, an early inflammatory/infectious process is not excluded in the proper clinical setting. 7. Diffuse subcutaneous fat stranding may related to fluid overload. 8. Hypodense blood pool, may be related to the anemia.       EKG    EKG Interpretation    Interpreted by emergency department physician    Rhythm: normal sinus   Rate: normal  Axis: normal  Ectopy: none  Conduction: normal  ST Segments: no acute change  T Waves: no acute change  Q Waves: none    Clinical Impression: non-specific EKG    All EKG's are interpreted by the Emergency Department Physician whoeither signs or Co-signs this chart in the absence of a cardiologist.    EMERGENCY DEPARTMENT COURSE:  ED Course as of 09/08/22 1718   u Sep 08, 2022   1401 CBC with Auto Differential(!!):    WBC 49.3(!!)   RBC 3.29(!)   Hemoglobin Quant 10. 2(!)   Hematocrit 29.6(!)   MCV 90.0   MCH 31.0   MCHC 34.5   RDW 16.4(!)   Platelet Count 96(!)   MPV 10.3   Seg Neutrophils PENDING   Lymphocytes PENDING   Monocytes PENDING   Eosinophils % PENDING   Basophils PENDING   Immature Granulocytes PENDING   Segs Absolute PENDING   Absolute Lymph # PENDING   Absolute Mono # PENDING   Absolute Eos # PENDING   Basophils Absolute PENDING   Absolute Immature Granulocyte PENDING [AO]   1401 Comprehensive Metabolic Panel w/ Reflex to MG(!):    Glucose, Random 62(!)   BUN,BUNPL 70(!)   Creatinine 4.34(!)   Bun/Cre Ratio 16   CALCIUM, SERUM, 007500 7.6(!)   Sodium 127(!)   Potassium 3.4(!)   Chloride 95(!)   CO2 17(!)   Anion Gap 15   Alk Phos 188(!)   ALT 24   AST 47(!)   Bilirubin 4.9(!)   Total Protein 5.6(!)   Albumin 1.8(!)   GFR Non- 13(!)   GFR  16(!)   GFR Comment      [AO]   1401 TOX SCR, BLD, ED(!):    Acetaminophen Level <10(!)   ETHANOL,ETHA <10   Ethanol percent <4.141   Salicylate Lvl <1(!)   Toxic Tricyclic Sc,Blood PENDING [AO]   1401 Troponin(!!):    Troponin, High Sensitivity 60(!!) [AO]   1401 Magnesium(!):    Magnesium 1.3(!) [AO]   1401 Venous Blood Gas, POC(!):    pH, Cruz 7.317(!)   pCO2, Cruz 32.9(!)   pO2, Cruz 84. 9(!)   HCO3, Venous 16.8(!)   Negative Base Excess, Cruz 8(!)   O2 Sat, Cruz 96(!) [AO]   1403 AMMONIA, PLASMA, 156356: 61 [AO]   1403 TSH: 2.98 [AO]   1406 Dr. Xiomy Leon has seen patient before for renal insufficiency, renal function in the past has ranged 2.5-3.5 [AO]   1428 XR CHEST 1 VIEW [AO]   1430 CT HEAD WO CONTRAST [AO]   1439 Lactate, Sepsis(!):    Lactic Acid, Sepsis 2.7(!)  Broad spectrum abx ordered  [AO]   1454 Troponin(!!):    Troponin, High Sensitivity 58(!!) [AO]   1512 Nephrology group doesn't come here.  Dr Cory Mendez group typically covers for them. Would recommend stoping DW5 as likely  will need D5NS with bicarb [AO]   1518 I went into room and personally paused D5W [AO]   1600 Urinalysis with Microscopic(!):    Color, UA Dark Yellow(!)   Turbidity UA Cloudy(!)   Glucose, UA NEGATIVE   Bilirubin, Urine Presumptive positive. Unable to confirm due to unavailability of reagent. (!)   Ketones, Urine TRACE(!)   Specific [place], UA 1.015   Urine Hgb 3+(!)   pH, UA 5.5   Protein, UA 1+(!)   Urobilinogen, Urine Normal   Nitrite, Urine NEGATIVE   Leukocyte Esterase, Urine SMALL(!)   WBC, UA 5 TO 10   RBC, UA 20 TO 50   Epithelial Cells, UA 5 TO 10   Bacteria, UA MANY(!) [AO]   1602 Urine Drug Screen:    Amphetamine Screen, Ur NEGATIVE   Barbiturate Screen, Ur NEGATIVE   Benzodiazepine Screen, Urine NEGATIVE   Cocaine Metabolite, Urine NEGATIVE   METHADONE SCREEN, URINE, 07826 NEGATIVE   Opiates, Urine NEGATIVE   Phencyclidine, Urine NEGATIVE   Cannabinoid Scrn, Ur NEGATIVE   Oxycodone Screen, Ur NEGATIVE   Fentanyl, Ur NEGATIVE   TEST INFORMATION Assay provides medical screening only. The absence of expected drug(s) and/or metabolite(s) may indicate diluted or adulterated urine, limitations of testing or timing of collection. [AO]   1605 Dr. Maira Webb. D5 1/2ns at 75meq at 125/hr. UA Na Cr eosinophils, blanca, renal US. [AO]   1083 CT CHEST ABDOMEN PELVIS WO CONTRAST [AO]   1 [facility] accepted  [AO]      ED Course User Index  [AO] Fernanda [place] Maldonado 1721, DO       MDM  Number of Diagnoses or Management Options  Acute cystitis with hematuria  Acute kidney injury superimposed on CKD (HCC)  Altered mental status, unspecified altered mental status type  Colitis  Hypocalcemia  Hypoglycemia  Hypokalemia  Hypomagnesemia  Hyponatremia  Lactic acidosis  Diagnosis management comments: 19-year-old male presents emergency department via EMS from home with altered mental status. Hypoglycemic at the scene.   Upon presentation he cannot tell me where he is and the month, however is otherwise pleasantly oblivious to everything is going on around him. Mild hypotension noted. However no tachycardia no fever good no hypoxia. Besides being confused he is otherwise nonfocal neuro exam.  He does appear slightly jaundiced. Poor skin turgor. Dry mucous membranes. Given 1 L saline bolus. Labs revealed severe leukocytosis with chronic anemia. HINA on CKD with multiple electrolyte disturbances. Lactic acid elevated. Blood culture sent and started on broad-spectrum antibiotics. Urinalysis concerning for UTI. Chest x-ray not concerning for pneumonia. CT chest abdomen pelvis obtained to rule out any other pathologies of his abnormal labs. Bladder wall thickening, concern for colitis, concern for proctitis. Gallstones but imaging suboptimal secondary to patient's lack of contrast which he cannot receive because of his renal function. Spoke to nephrology, placed on half-normal saline D5 with 75 bicarb at 125 an hour. Medeiros placed per instructions from nephrology. Renal ultrasound as well as right upper quadrant ultrasound ordered. Ammonia and venous blood gas unremarkable. CT head negative for any acute abnormalities. Altered mental status likely secondary to infection, electrolyte disturbances, hypoglycemia. Admit to hospitalist under critical care status with nephrology consult. I believe that his elevated troponins are likely secondary to his renal dysfunction. EKG not concerning and repeat troponin stable.        Amount and/or Complexity of Data Reviewed  Clinical lab tests: ordered and reviewed  Tests in the radiology section of CPT®: ordered and reviewed  Decide to obtain previous medical records or to obtain history from someone other than the patient: yes  Obtain history from someone other than the patient: yes  Review and summarize past medical records: yes  Discuss the patient with other providers: yes  Independent visualization of images, tracings, or specimens: yes    Critical Care  Total time providing critical care: 30-74 minutes    Patient Progress  Patient progress: stable      PROCEDURES:  Procedures     CONSULTS:  PHARMACY TO DOSE VANCOMYCIN  IP CONSULT TO NEPHROLOGY  IP CONSULT TO NEPHROLOGY  IP CONSULT TO HOSPITALIST  IP CONSULT TO CRITICAL CARE  PHARMACY TO DOSE VANCOMYCIN    CRITICAL CARE:  There was significant risk of life threatening deterioration of patient's condition requiring my direct management. Critical care time 67 minutes, excluding any documented procedures. FINAL IMPRESSION     1. Altered mental status, unspecified altered mental status type    2. Hypoglycemia    3. Hypocalcemia    4. Hypokalemia    5. Hypomagnesemia    6. Hyponatremia    7. Acute kidney injury superimposed on CKD ([facility] [address]    8. Lactic acidosis    9. Acute cystitis with hematuria    10. Colitis    11. Proctitis    12. Calculus of gallbladder without cholecystitis without obstruction        DISPOSITION / PLAN   DISPOSITION Admitted 09/08/2022 05:07:05 PM    ADMIT    Masha Dunn DO  Emergency Medicine Physician    (Please note that portions of this note were completed with a voice recognition program.  Efforts were made to edit the dictations but occasionally words are mis-transcribed.)        Carlos Jimenez DO  09/08/22 [id number]

## 2022-09-08 NOTE — H&P
[facility]  Office: [address], DO, Markos Valdes, DO, Lenin Moyer, DO, Steven Gamboa, DO, Yoanna Parkinson MD, Sherry Bedoya MD, Yariel Washington MD, Siva Lott MD,  Floresita Marie MD, Keagan Christensen MD, Loreto Alonso DO, Filomena Tanner MD,  Patel Ackerman MD, Deepa Perez MD, Memo Andrade DO, Rolf Sparks MD, Arnie Vera MD, Alan Navarro MD, Ulysses Gandhi MD, Vish Simms MD, April Alvarez MD, Trav Earl DO, Claudeen Hook, MD, Eric Ramirez MD, Roman Siddiqui, CNP,  Umair Zarate, CNP, Reagan Barrera, CNP, Crystal Choi, CNP, Yariel Tao, PA-C, Tara Varner, DNP, Tanika Ford, CNP, Kyler Myers, CNP, Rome Garcia, CNP, Jacque Sparrow, CNP, Poppy Wolf, CNP, Marylene Bucy, CNS, Rodrigo Reyna, [facility], Lexii Aviles, CNP, Miroslava Nance, CNP, Ade Weir, [facility]    HISTORY AND PHYSICAL EXAMINATION            Date:   2022  Patient name:  Sarah Townsend  Date of admission:  2022 12:37 PM  MRN:   [id number]  Account:  [de-identified]  YOB: 1935  PCP:    No primary care provider on file. Room:   [address] [place] [address]  Code Status:    Full    Chief Complaint:     Chief Complaint   Patient presents with    Altered Mental Status     History Obtained From:     patient, electronic medical record    History of Present Illness:     Patient presents to the emergency room with altered mental status. Patient states that he may have overdosed on his sleeping pills. He says he is supposed to take 3, but instead he took 6. Per ED notes, when the EMS arrived, he was found to have a blood sugar level in the 30s. He was given an amp of dextrose and when his blood sugar was rechecked in the ED, it was in the 60s. Another amp of dextrose was given.  Patient continues to be confused about a few details, but knows that we are in a hospital. Patient states that he has been eating and drinking well. Voiding without any issues. Denies any recent fevers, chills, chest pain, shortness of breath, nausea, vomiting and diarrhea. Patient's current complaint is stiffness and dry mouth. Throughout the emergency room evaluation it was noted that his sodium level is 127. Potassium 3.4. Chloride 95. CO2 17. BUN 70. Creatinine 4.34. GFR 13. Magnesium 1.3. Lactic acid 2.7. Glucose 67. Calcium 7.6. Troponin 58. Alk phos 188. AST 47. Bilirubin 4.9. Total protein 5.6. WBC 49.3. Hemoglobin 10.2. Platelet count 96. Small amounts of leukocyte esterase in urine many bacteria. CT head shows no acute intracranial abnormality. CT chest shows:   1. Diffuse wall thickening of the ascending and transverse colon may   represent colitis. 2. Questionable rectal wall thickening may represent proctitis. 3. Suspected cholelithiasis. Suboptimal evaluation of gallbladder fossa. Can correlate with right upper quadrant ultrasound. 4. Mild bladder wall thickening may be related to underdistension versus   possible cystitis. Can correlate with urinalysis. 5. Small amount of free fluid present around the liver and spleen. 6. Mild bilateral lower lobe patchy opacities are favored to represent   atelectasis. However, an early inflammatory/infectious process is not   excluded in the proper clinical setting. 7. Diffuse subcutaneous fat stranding may related to fluid overload. 8. Hypodense blood pool, may be related to the anemia. CXR: No radiographic evidence of an acute cardiopulmonary process    A renal ultrasound was obtained which shows: An unremarkable ultrasound of the kidneys and urinary bladder. Past Medical History:     Past Medical History:   Diagnosis Date    Diabetes mellitus ([facility] [address]         Past Surgical History:     History reviewed. No pertinent surgical history.      Medications Prior to Admission:     Prior to Admission medications    Not on File Allergies:     Cefazolin    Social History:     Tobacco:    has no history on file for tobacco use. Alcohol:      reports that he does not currently use alcohol. Drug Use:  has no history on file for drug use. Family History:     No family history on file. Review of Systems:     Positive and Negative as described in HPI. Review of Systems   Constitutional:  Negative for activity change, appetite change, chills and fever. HENT:  Negative for congestion. Respiratory:  Negative for cough and shortness of breath. Cardiovascular: Negative. Gastrointestinal:  Negative for abdominal pain, diarrhea, nausea and vomiting. Genitourinary:  Negative for difficulty urinating. Musculoskeletal:  Negative for arthralgias and myalgias. Skin:  Negative for wound. Neurological:  Positive for weakness. Negative for dizziness and numbness. Physical Exam:   /67   Pulse 85   Temp 97.5 °F (36.4 °C) (Oral)   Resp 16   Wt 200 lb (90.7 kg)   SpO2 96%   Temp (24hrs), Av.5 °F (36.4 °C), Min:97.5 °F (36.4 °C), Max:97.5 °F (36.4 °C)    Recent Labs     22  1258 22  1815   POCGLU 64* 140*     No intake or output data in the 24 hours ending 22 1826    Physical Exam  Vitals and nursing note reviewed. Constitutional:       General: He is not in acute distress. Appearance: Normal appearance. He is ill-appearing. HENT:      Head: Normocephalic. Mouth/Throat:      Mouth: Mucous membranes are dry. Eyes:      Pupils: Pupils are equal, round, and reactive to light. Cardiovascular:      Rate and Rhythm: Normal rate and regular rhythm. Pulses: Normal pulses. Heart sounds: Normal heart sounds. No murmur heard. No friction rub. No gallop. Pulmonary:      Effort: Pulmonary effort is normal. No respiratory distress. Breath sounds: Examination of the right-lower field reveals decreased breath sounds.  Examination of the left-lower field reveals decreased breath sounds. Decreased breath sounds present. No wheezing or rales. Abdominal:      General: Bowel sounds are normal. There is no distension. Palpations: Abdomen is soft. Tenderness: There is no abdominal tenderness. Musculoskeletal:         General: No swelling. Normal range of motion. Cervical back: Normal range of motion. Skin:     General: Skin is warm and dry. Capillary Refill: Capillary refill takes less than 2 seconds. Coloration: Skin is pale. Neurological:      Mental Status: He is alert. He is confused. Motor: Weakness present. Psychiatric:         Mood and Affect: Mood normal.         Behavior: Behavior is cooperative.        Investigations:      Laboratory Testing:  Recent Results (from the past 24 hour(s))   EKG 12 Lead    Collection Time: 09/08/22 12:45 PM   Result Value Ref Range    Ventricular Rate 90 BPM    Atrial Rate 90 BPM    P-R Interval 174 ms    QRS Duration 86 ms    Q-T Interval 386 ms    QTc Calculation (Bazett) 472 ms    P Axis 86 degrees    R Axis 43 degrees    T Axis 51 degrees   POC Glucose Fingerstick    Collection Time: 09/08/22 12:58 PM   Result Value Ref Range    POC Glucose 64 (L) 75 - 110 mg/dL   CBC with Auto Differential    Collection Time: 09/08/22  1:05 PM   Result Value Ref Range    WBC 49.3 (HH) 3.5 - 11.3 k/uL    RBC 3.29 (L) 4.21 - 5.77 m/uL    Hemoglobin 10.2 (L) 13.0 - 17.0 g/dL    Hematocrit 29.6 (L) 40.7 - 50.3 %    MCV 90.0 82.6 - 102.9 fL    MCH 31.0 25.2 - 33.5 pg    MCHC 34.5 28.0 - 38.0 g/dL    RDW 16.4 (H) 11.8 - 14.4 %    Platelets 96 (L) 004 - 453 k/uL    MPV 10.3 8.1 - 13.5 fL    Seg Neutrophils 94 (H) 36 - 65 %    Lymphocytes 1 (L) 24 - 43 %    Monocytes 3 3 - 12 %    Eosinophils % 0 (L) 1 - 4 %    Basophils 0 0 - 2 %    Immature Granulocytes 2 (H) 0 %    Segs Absolute 46.34 (H) 1.50 - 8.10 k/uL    Absolute Lymph # 0.49 (L) 1.10 - 3.70 k/uL    Absolute Mono # 1.48 (H) 0.10 - 1.20 k/uL    Absolute Eos # 0.00 0.00 - 0.44 k/uL    Basophils Absolute 0.00 0.00 - 0.20 k/uL    Absolute Immature Granulocyte 0.99 (H) 0.00 - 0.30 k/uL    Morphology INCREASED BANDS PRESENT     Morphology VACUOLATED NEUTROPHILS PRESENT    Comprehensive Metabolic Panel w/ Reflex to MG    Collection Time: 09/08/22  1:05 PM   Result Value Ref Range    Glucose 62 (L) 70 - 99 mg/dL    BUN 70 (H) 8 - 23 mg/dL    Creatinine 4.34 (H) 0.70 - 1.20 mg/dL    Bun/Cre Ratio 16 9 - 20    Calcium 7.6 (L) 8.6 - 10.4 mg/dL    Sodium 127 (L) 135 - 144 mmol/L    Potassium 3.4 (L) 3.7 - 5.3 mmol/L    Chloride 95 (L) 98 - 107 mmol/L    CO2 17 (L) 20 - 31 mmol/L    Anion Gap 15 9 - 17 mmol/L    Alkaline Phosphatase 188 (H) 40 - 129 U/L    ALT 24 5 - 41 U/L    AST 47 (H) <40 U/L    Total Bilirubin 4.9 (H) 0.3 - 1.2 mg/dL    Total Protein 5.6 (L) 6.4 - 8.3 g/dL    Albumin 1.8 (L) 3.5 - 5.2 g/dL    GFR Non-African American 13 (L) >60 mL/min    GFR  16 (L) >60 mL/min    GFR Comment         TSH w/reflex to FT4    Collection Time: 09/08/22  1:05 PM   Result Value Ref Range    TSH 2.98 0.30 - 5.00 uIU/mL   TOX SCR, BLD, ED    Collection Time: 09/08/22  1:05 PM   Result Value Ref Range    Acetaminophen Level <10 (L) 10 - 30 ug/mL    Ethanol <10 <10 mg/dL    Ethanol percent <7.338 <5.801 %    Salicylate Lvl <1 (L) 3 - 10 mg/dL    Toxic Tricyclic Sc,Blood NEGATIVE NEGATIVE   Ammonia    Collection Time: 09/08/22  1:05 PM   Result Value Ref Range    Ammonia 60 16 - 60 umol/L   Troponin    Collection Time: 09/08/22  1:05 PM   Result Value Ref Range    Troponin, High Sensitivity 60 (HH) 0 - 22 ng/L   Magnesium    Collection Time: 09/08/22  1:05 PM   Result Value Ref Range    Magnesium 1.3 (L) 1.6 - 2.6 mg/dL   Venous Blood Gas, POC    Collection Time: 09/08/22  1:26 PM   Result Value Ref Range    pH, Cruz 7.317 (L) 7.320 - 7.430    pCO2, Cruz 32.9 (L) 41.0 - 51.0 mm Hg    pO2, Cruz 84.9 (H) 30.0 - 50.0 mm Hg    HCO3, Venous 16.8 (L) 22.0 - 29.0 mmol/L    Negative Base Excess, Cruz 8 (H) 0.0 - 2.0    O2 Sat, Cruz 96 (H) 60.0 - 85.0 %   Culture, Blood 1    Collection Time: 09/08/22  2:10 PM    Specimen: Blood   Result Value Ref Range    Specimen Description . BLOOD     Special Requests LFA 12ML     Culture NO GROWTH <24 HRS    Troponin    Collection Time: 09/08/22  2:19 PM   Result Value Ref Range    Troponin, High Sensitivity 58 (HH) 0 - 22 ng/L   Lactate, Sepsis    Collection Time: 09/08/22  2:19 PM   Result Value Ref Range    Lactic Acid, Sepsis 2.7 (H) 0.5 - 1.9 mmol/L   Culture, Blood 1    Collection Time: 09/08/22  2:19 PM    Specimen: Blood   Result Value Ref Range    Specimen Description . BLOOD     Special Requests RFA 12ML     Culture NO GROWTH <24 HRS    Urine Drug Screen    Collection Time: 09/08/22  3:33 PM   Result Value Ref Range    Amphetamine Screen, Ur NEGATIVE NEGATIVE    Barbiturate Screen, Ur NEGATIVE NEGATIVE    Benzodiazepine Screen, Urine NEGATIVE     Cocaine Metabolite, Urine NEGATIVE NEGATIVE    Methadone Screen, Urine NEGATIVE NEGATIVE    Opiates, Urine NEGATIVE NEGATIVE    Phencyclidine, Urine NEGATIVE NEGATIVE    Cannabinoid Scrn, Ur NEGATIVE NEGATIVE    Oxycodone Screen, Ur NEGATIVE NEGATIVE    Fentanyl, Ur NEGATIVE NEGATIVE    Test Information       Assay provides medical screening only. The absence of expected drug(s) and/or metabolite(s) may indicate diluted or adulterated urine, limitations of testing or timing of collection. Urinalysis with Microscopic    Collection Time: 09/08/22  3:33 PM   Result Value Ref Range    Color, UA Dark Yellow (A) Yellow    Turbidity UA Cloudy (A) Clear    Glucose, Ur NEGATIVE NEGATIVE    Bilirubin Urine (A) NEGATIVE     Presumptive positive. Unable to confirm due to unavailability of reagent.     Ketones, Urine TRACE (A) NEGATIVE    Specific Gravity, UA 1.015 1.005 - 1.030    Urine Hgb 3+ (A) NEGATIVE    pH, UA 5.5 5.0 - 8.0    Protein, UA 1+ (A) NEGATIVE    Urobilinogen, Urine Normal Normal    Nitrite, Urine NEGATIVE NEGATIVE Leukocyte Esterase, Urine SMALL (A) NEGATIVE    WBC, UA 5 TO 10 0 - 5 /HPF    RBC, UA 20 TO 50 0 - 2 /HPF    Epithelial Cells UA 5 TO 10 0 - 5 /HPF    Bacteria, UA MANY (A) None   Lactate, Sepsis    Collection Time: 09/08/22  5:44 PM   Result Value Ref Range    Lactic Acid, Sepsis 2.6 (H) 0.5 - 1.9 mmol/L   POC Glucose Fingerstick    Collection Time: 09/08/22  6:15 PM   Result Value Ref Range    POC Glucose 140 (H) 75 - 110 mg/dL       Imaging/Diagnostics:  CT HEAD WO CONTRAST    Result Date: 9/8/2022  No acute intracranial abnormality. Prominent CSF spaces over the frontal and parietal lobes. No evidence of hydrocephalus. XR CHEST 1 VIEW    Result Date: 9/8/2022  Suboptimal evaluation secondary to patient positioning/rotation. No radiographic evidence of an acute cardiopulmonary process. US RETROPERITONEAL COMPLETE    Result Date: 9/8/2022  Unremarkable ultrasound of the kidneys and urinary bladder. CT CHEST ABDOMEN PELVIS WO CONTRAST    Result Date: 9/8/2022  1. Diffuse wall thickening of the ascending and transverse colon may represent colitis. 2. Questionable rectal wall thickening may represent proctitis. 3. Suspected cholelithiasis. Suboptimal evaluation of gallbladder fossa. Can correlate with right upper quadrant ultrasound. 4. Mild bladder wall thickening may be related to underdistension versus possible cystitis. Can correlate with urinalysis. 5. Small amount of free fluid present around the liver and spleen. 6. Mild bilateral lower lobe patchy opacities are favored to represent atelectasis. However, an early inflammatory/infectious process is not excluded in the proper clinical setting. 7. Diffuse subcutaneous fat stranding may related to fluid overload. 8. Hypodense blood pool, may be related to the anemia.        Assessment :      Hospital Problems             Last Modified POA    * (Principal) HINA (acute kidney injury) ([facility] [address] 9/8/2022 Yes    Hypoglycemia 9/8/2022 Yes    Thrombocytopenia ([facility] [address] 9/8/2022 Yes    Leukocytosis 9/8/2022 Yes    Hyponatremia 9/8/2022 Yes    Hypokalemia 9/8/2022 Yes    Hypotension 9/8/2022 Yes    Colitis 9/8/2022 Yes    Cystitis 9/8/2022 Yes    Proctitis 9/8/2022 Yes    Cholelithiasis 9/8/2022 Yes    Hypomagnesemia 9/8/2022 Yes       Plan:     Patient status inpatient in the  Medical ICU    HINA  Nephrology consulted  IV fluids per nephrology  Bladder scan  Renal ultrasound  Urine studies  Hypoglycemia  Monitor blood sugar levels every 6 hours and as needed  Thrombocytopenia  Continue to monitor  Colitis/Cystitis/Proctitis/ Cholelithiasis  IV Cipro and Flagyl  Monitor labs  Hyponatremia  IV fluids per nephrology. Continue to monitor labs  Hypokalemia  Replace with 20 mEq KCl. Repeat labs  Hypomagnesemia  Replace with 2 g IV magnesium. Repeat labs  Hypotension  IV fluids per nephrology  Continue to monitor  Critical care consulted  Adult diet  Monitor a.m. labs    Consultations:   IP CONSULT TO NEPHROLOGY  IP CONSULT TO NEPHROLOGY  IP CONSULT TO HOSPITALIST  IP CONSULT TO CRITICAL CARE    Patient is admitted as inpatient status because of co-morbidities listed above, severity of signs and symptoms as outlined, requirement for current medical therapies and most importantly because of direct risk to patient if care not provided in a hospital setting. Expected length of stay > 48 hours. On this date 9/8/2022 I have spent 33 minutes reviewing previous notes, test results and face to face with the patient discussing the diagnosis and importance of compliance with the treatment plan as well as documenting on the day of the visit. At least 50% of the time documented was spent with the patient to provide counseling and/or coordination of care. DIONICIO Walters CNP  9/8/2022  6:26 PM    Copy sent to Dr. Trixie Casarez primary care provider on file.

## 2022-09-09 ENCOUNTER — APPOINTMENT (OUTPATIENT)
Dept: GENERAL RADIOLOGY | Age: 87
DRG: 871 | End: 2022-09-09
Payer: MEDICARE

## 2022-09-09 ENCOUNTER — APPOINTMENT (OUTPATIENT)
Dept: ULTRASOUND IMAGING | Age: 87
DRG: 871 | End: 2022-09-09
Payer: MEDICARE

## 2022-09-09 LAB
ABSOLUTE EOS #: 0 K/UL (ref 0–0.4)
ABSOLUTE IMMATURE GRANULOCYTE: 1.92 K/UL (ref 0–0.3)
ABSOLUTE LYMPH #: 0.96 K/UL (ref 1–4.8)
ABSOLUTE MONO #: 1.44 K/UL (ref 0.2–0.8)
ABSOLUTE RETIC #: 0.04 M/UL (ref 0.03–0.08)
ABSOLUTE RETIC #: 0.05 M/UL (ref 0.03–0.08)
ALBUMIN SERPL-MCNC: 1.6 G/DL (ref 3.5–5.2)
ALP BLD-CCNC: 153 U/L (ref 40–129)
ALT SERPL-CCNC: 20 U/L (ref 5–41)
AMMONIA: 31 UMOL/L (ref 16–60)
ANION GAP SERPL CALCULATED.3IONS-SCNC: 11 MMOL/L (ref 9–17)
ANION GAP SERPL CALCULATED.3IONS-SCNC: 13 MMOL/L (ref 9–17)
ANION GAP SERPL CALCULATED.3IONS-SCNC: 15 MMOL/L (ref 9–17)
ANION GAP SERPL CALCULATED.3IONS-SCNC: 15 MMOL/L (ref 9–17)
AST SERPL-CCNC: 39 U/L
BASOPHILS # BLD: 0 %
BASOPHILS ABSOLUTE: 0 K/UL (ref 0–0.2)
BILIRUB SERPL-MCNC: 3.9 MG/DL (ref 0.3–1.2)
BUN BLDV-MCNC: 63 MG/DL (ref 8–23)
BUN BLDV-MCNC: 65 MG/DL (ref 8–23)
BUN/CREAT BLD: 15 (ref 9–20)
BUN/CREAT BLD: 15 (ref 9–20)
BUN/CREAT BLD: 17 (ref 9–20)
BUN/CREAT BLD: 17 (ref 9–20)
CALCIUM SERPL-MCNC: 7.3 MG/DL (ref 8.6–10.4)
CALCIUM SERPL-MCNC: 7.4 MG/DL (ref 8.6–10.4)
CALCIUM SERPL-MCNC: 7.4 MG/DL (ref 8.6–10.4)
CALCIUM SERPL-MCNC: 7.5 MG/DL (ref 8.6–10.4)
CHLORIDE BLD-SCNC: 92 MMOL/L (ref 98–107)
CHLORIDE BLD-SCNC: 92 MMOL/L (ref 98–107)
CHLORIDE BLD-SCNC: 93 MMOL/L (ref 98–107)
CHLORIDE BLD-SCNC: 99 MMOL/L (ref 98–107)
CO2: 16 MMOL/L (ref 20–31)
CO2: 16 MMOL/L (ref 20–31)
CO2: 18 MMOL/L (ref 20–31)
CO2: 20 MMOL/L (ref 20–31)
CREAT SERPL-MCNC: 3.71 MG/DL (ref 0.7–1.2)
CREAT SERPL-MCNC: 3.91 MG/DL (ref 0.7–1.2)
CREAT SERPL-MCNC: 4.21 MG/DL (ref 0.7–1.2)
CREAT SERPL-MCNC: 4.27 MG/DL (ref 0.7–1.2)
EKG ATRIAL RATE: 90 BPM
EKG P AXIS: 86 DEGREES
EKG P-R INTERVAL: 174 MS
EKG Q-T INTERVAL: 386 MS
EKG QRS DURATION: 86 MS
EKG QTC CALCULATION (BAZETT): 472 MS
EKG R AXIS: 43 DEGREES
EKG T AXIS: 51 DEGREES
EKG VENTRICULAR RATE: 90 BPM
EOSINOPHILS RELATIVE PERCENT: 0 % (ref 1–4)
GFR AFRICAN AMERICAN: 16 ML/MIN
GFR AFRICAN AMERICAN: 16 ML/MIN
GFR AFRICAN AMERICAN: 18 ML/MIN
GFR AFRICAN AMERICAN: 19 ML/MIN
GFR NON-AFRICAN AMERICAN: 13 ML/MIN
GFR NON-AFRICAN AMERICAN: 13 ML/MIN
GFR NON-AFRICAN AMERICAN: 15 ML/MIN
GFR NON-AFRICAN AMERICAN: 16 ML/MIN
GFR SERPL CREATININE-BSD FRML MDRD: ABNORMAL ML/MIN/{1.73_M2}
GLUCOSE BLD-MCNC: 102 MG/DL (ref 75–110)
GLUCOSE BLD-MCNC: 109 MG/DL (ref 75–110)
GLUCOSE BLD-MCNC: 122 MG/DL (ref 75–110)
GLUCOSE BLD-MCNC: 131 MG/DL (ref 75–110)
GLUCOSE BLD-MCNC: 132 MG/DL (ref 75–110)
GLUCOSE BLD-MCNC: 133 MG/DL (ref 70–99)
GLUCOSE BLD-MCNC: 134 MG/DL (ref 75–110)
GLUCOSE BLD-MCNC: 135 MG/DL (ref 75–110)
GLUCOSE BLD-MCNC: 142 MG/DL (ref 70–99)
GLUCOSE BLD-MCNC: 146 MG/DL (ref 75–110)
GLUCOSE BLD-MCNC: 147 MG/DL (ref 70–99)
GLUCOSE BLD-MCNC: 147 MG/DL (ref 70–99)
GLUCOSE BLD-MCNC: 155 MG/DL (ref 75–110)
GLUCOSE BLD-MCNC: 168 MG/DL (ref 75–110)
HCT VFR BLD CALC: 29.6 % (ref 40.7–50.3)
HEMOGLOBIN: 10.2 G/DL (ref 13–17)
IMMATURE GRANULOCYTES: 4 %
IMMATURE RETIC FRACT: 25.1 % (ref 2.7–18.3)
IMMATURE RETIC FRACT: 25.8 % (ref 2.7–18.3)
INR BLD: 1.9
LACTIC ACID, SEPSIS: 2.7 MMOL/L (ref 0.5–1.9)
LACTIC ACID, SEPSIS: 2.8 MMOL/L (ref 0.5–1.9)
LYMPHOCYTES # BLD: 2 % (ref 24–44)
MAGNESIUM: 2 MG/DL (ref 1.6–2.6)
MCH RBC QN AUTO: 30.6 PG (ref 25.2–33.5)
MCHC RBC AUTO-ENTMCNC: 34.5 G/DL (ref 28.4–34.8)
MCV RBC AUTO: 88.9 FL (ref 82.6–102.9)
MONOCYTES # BLD: 3 % (ref 1–7)
MORPHOLOGY: ABNORMAL
MORPHOLOGY: ABNORMAL
NRBC AUTOMATED: 0 PER 100 WBC
PDW BLD-RTO: 16.5 % (ref 11.8–14.4)
PHOSPHORUS: 3.9 MG/DL (ref 2.5–4.5)
PLATELET # BLD: 96 K/UL (ref 138–453)
PMV BLD AUTO: 10.7 FL (ref 8.1–13.5)
POTASSIUM SERPL-SCNC: 3.4 MMOL/L (ref 3.7–5.3)
POTASSIUM SERPL-SCNC: 3.5 MMOL/L (ref 3.7–5.3)
POTASSIUM SERPL-SCNC: 3.6 MMOL/L (ref 3.7–5.3)
POTASSIUM SERPL-SCNC: 3.7 MMOL/L (ref 3.7–5.3)
PROTHROMBIN TIME: 21.7 SEC (ref 11.5–14.2)
RBC # BLD: 3.33 M/UL (ref 4.21–5.77)
RETIC %: 1.3 % (ref 0.5–1.9)
RETIC %: 1.5 % (ref 0.5–1.9)
RETIC HEMOGLOBIN: 32.4 PG (ref 28.2–35.7)
RETIC HEMOGLOBIN: 36.6 PG (ref 28.2–35.7)
SEG NEUTROPHILS: 91 % (ref 36–66)
SEGMENTED NEUTROPHILS ABSOLUTE COUNT: 43.58 K/UL (ref 1.8–7.7)
SODIUM BLD-SCNC: 123 MMOL/L (ref 135–144)
SODIUM BLD-SCNC: 123 MMOL/L (ref 135–144)
SODIUM BLD-SCNC: 126 MMOL/L (ref 135–144)
SODIUM BLD-SCNC: 127 MMOL/L (ref 135–144)
SODIUM BLD-SCNC: 128 MMOL/L (ref 135–144)
TOTAL PROTEIN: 5.1 G/DL (ref 6.4–8.3)
TROPONIN, HIGH SENSITIVITY: 48 NG/L (ref 0–22)
WBC # BLD: 47.9 K/UL (ref 3.5–11.3)

## 2022-09-09 PROCEDURE — 02HV33Z INSERTION OF INFUSION DEVICE INTO SUPERIOR VENA CAVA, PERCUTANEOUS APPROACH: ICD-10-PCS | Performed by: INTERNAL MEDICINE

## 2022-09-09 PROCEDURE — 83605 ASSAY OF LACTIC ACID: CPT

## 2022-09-09 PROCEDURE — 6370000000 HC RX 637 (ALT 250 FOR IP): Performed by: INTERNAL MEDICINE

## 2022-09-09 PROCEDURE — 2500000003 HC RX 250 WO HCPCS: Performed by: INTERNAL MEDICINE

## 2022-09-09 PROCEDURE — 6360000002 HC RX W HCPCS: Performed by: FAMILY MEDICINE

## 2022-09-09 PROCEDURE — 80048 BASIC METABOLIC PNL TOTAL CA: CPT

## 2022-09-09 PROCEDURE — 76705 ECHO EXAM OF ABDOMEN: CPT

## 2022-09-09 PROCEDURE — 36556 INSERT NON-TUNNEL CV CATH: CPT

## 2022-09-09 PROCEDURE — 2580000003 HC RX 258: Performed by: NURSE PRACTITIONER

## 2022-09-09 PROCEDURE — APPNB30 APP NON BILLABLE TIME 0-30 MINS: Performed by: NURSE PRACTITIONER

## 2022-09-09 PROCEDURE — 83735 ASSAY OF MAGNESIUM: CPT

## 2022-09-09 PROCEDURE — 82140 ASSAY OF AMMONIA: CPT

## 2022-09-09 PROCEDURE — 6360000002 HC RX W HCPCS: Performed by: INTERNAL MEDICINE

## 2022-09-09 PROCEDURE — 2580000003 HC RX 258: Performed by: FAMILY MEDICINE

## 2022-09-09 PROCEDURE — 82947 ASSAY GLUCOSE BLOOD QUANT: CPT

## 2022-09-09 PROCEDURE — 2500000003 HC RX 250 WO HCPCS: Performed by: NURSE PRACTITIONER

## 2022-09-09 PROCEDURE — 2500000003 HC RX 250 WO HCPCS: Performed by: FAMILY MEDICINE

## 2022-09-09 PROCEDURE — 85610 PROTHROMBIN TIME: CPT

## 2022-09-09 PROCEDURE — 84484 ASSAY OF TROPONIN QUANT: CPT

## 2022-09-09 PROCEDURE — 71045 X-RAY EXAM CHEST 1 VIEW: CPT

## 2022-09-09 PROCEDURE — 6360000002 HC RX W HCPCS: Performed by: NURSE PRACTITIONER

## 2022-09-09 PROCEDURE — 93010 ELECTROCARDIOGRAM REPORT: CPT | Performed by: INTERNAL MEDICINE

## 2022-09-09 PROCEDURE — 2000000000 HC ICU R&B

## 2022-09-09 PROCEDURE — 85045 AUTOMATED RETICULOCYTE COUNT: CPT

## 2022-09-09 PROCEDURE — 2580000003 HC RX 258: Performed by: INTERNAL MEDICINE

## 2022-09-09 PROCEDURE — A4216 STERILE WATER/SALINE, 10 ML: HCPCS | Performed by: INTERNAL MEDICINE

## 2022-09-09 PROCEDURE — 85025 COMPLETE CBC W/AUTO DIFF WBC: CPT

## 2022-09-09 PROCEDURE — 36415 COLL VENOUS BLD VENIPUNCTURE: CPT

## 2022-09-09 PROCEDURE — 80053 COMPREHEN METABOLIC PANEL: CPT

## 2022-09-09 PROCEDURE — 94761 N-INVAS EAR/PLS OXIMETRY MLT: CPT

## 2022-09-09 PROCEDURE — 84100 ASSAY OF PHOSPHORUS: CPT

## 2022-09-09 PROCEDURE — C9113 INJ PANTOPRAZOLE SODIUM, VIA: HCPCS | Performed by: INTERNAL MEDICINE

## 2022-09-09 RX ORDER — MIDAZOLAM HYDROCHLORIDE 1 MG/ML
4 INJECTION INTRAMUSCULAR; INTRAVENOUS ONCE
Status: COMPLETED | OUTPATIENT
Start: 2022-09-09 | End: 2022-09-09

## 2022-09-09 RX ORDER — MIDODRINE HYDROCHLORIDE 10 MG/1
10 TABLET ORAL
Status: DISCONTINUED | OUTPATIENT
Start: 2022-09-09 | End: 2022-09-15 | Stop reason: HOSPADM

## 2022-09-09 RX ORDER — PHENTOLAMINE MESYLATE 5 MG/1
5 INJECTION INTRAMUSCULAR; INTRAVENOUS ONCE
Status: COMPLETED | OUTPATIENT
Start: 2022-09-09 | End: 2022-09-09

## 2022-09-09 RX ADMIN — Medication 5 MCG/MIN: at 09:32

## 2022-09-09 RX ADMIN — SODIUM BICARBONATE: 84 INJECTION, SOLUTION INTRAVENOUS at 02:12

## 2022-09-09 RX ADMIN — METRONIDAZOLE 500 MG: 500 INJECTION, SOLUTION INTRAVENOUS at 03:30

## 2022-09-09 RX ADMIN — CEFTRIAXONE 2000 MG: 2 INJECTION, POWDER, FOR SOLUTION INTRAMUSCULAR; INTRAVENOUS at 12:33

## 2022-09-09 RX ADMIN — MIDAZOLAM HYDROCHLORIDE 2 MG: 2 INJECTION, SOLUTION INTRAMUSCULAR; INTRAVENOUS at 11:42

## 2022-09-09 RX ADMIN — SODIUM BICARBONATE: 84 INJECTION, SOLUTION INTRAVENOUS at 22:18

## 2022-09-09 RX ADMIN — POTASSIUM BICARBONATE 20 MEQ: 782 TABLET, EFFERVESCENT ORAL at 21:27

## 2022-09-09 RX ADMIN — SODIUM CHLORIDE 40 MG: 9 INJECTION, SOLUTION INTRAMUSCULAR; INTRAVENOUS; SUBCUTANEOUS at 14:50

## 2022-09-09 RX ADMIN — MIDODRINE HYDROCHLORIDE 10 MG: 10 TABLET ORAL at 12:34

## 2022-09-09 RX ADMIN — POTASSIUM BICARBONATE 20 MEQ: 782 TABLET, EFFERVESCENT ORAL at 12:34

## 2022-09-09 RX ADMIN — POTASSIUM BICARBONATE 20 MEQ: 782 TABLET, EFFERVESCENT ORAL at 17:19

## 2022-09-09 RX ADMIN — MIDODRINE HYDROCHLORIDE 10 MG: 10 TABLET ORAL at 17:19

## 2022-09-09 RX ADMIN — SODIUM BICARBONATE: 84 INJECTION, SOLUTION INTRAVENOUS at 11:43

## 2022-09-09 RX ADMIN — PHENTOLAMINE MESYLATE 5 MG: 5 INJECTION, POWDER, FOR SOLUTION INTRAMUSCULAR; INTRAVENOUS at 10:51

## 2022-09-09 RX ADMIN — CIPROFLOXACIN 400 MG: 2 INJECTION, SOLUTION INTRAVENOUS at 11:16

## 2022-09-09 ASSESSMENT — ENCOUNTER SYMPTOMS
ABDOMINAL PAIN: 0
COUGH: 0
CONSTIPATION: 0
CHOKING: 0
CHEST TIGHTNESS: 0
SINUS PRESSURE: 0
BACK PAIN: 1
RHINORRHEA: 0
SHORTNESS OF BREATH: 0
DIARRHEA: 0
VOICE CHANGE: 0
VOMITING: 0
NAUSEA: 0
WHEEZING: 0

## 2022-09-09 NOTE — CONSULTS
Pulmonary Medicine and Critical Care Consult    Patient - Kasia Coffey   MRN -  [id number]   Acct # - [de-identified]   - 1935      Date of Admission -  2022 12:37 PM  Date of evaluation -  2022  Room - [address] [place] [address] Lizabeth Barton MD Primary Care Physician - No primary care provider on file. Reason for Consult       ICU management  Assessment    Sepsis/septic shock /cholangitis   Acute renal failure /metabolic acidosis   hyponatremia   Change in mental status   Levophed subcutaneous infiltration    Recurrent hypoglycemia due to sepsis   Colitis on CT   diabetes    Recommendations    Oxygen by nasal cannula   Incentive spirometer every hour awake   DuoNeb p.r.n. NPO /monitor mental status   Check blood culture sensitivity   Rocephin status post Vanco /ID consult   GI consult /ultrasound liver monitor liver function test and coags   Levophed to map more than 65   Bicarb drip /monitor urine output creatinine and sodium   Monitor troponins   Monitor blood sugars   Peripheral blood  smear   Discussed with Nephrology /retroperitoneal ultrasound   Regitine/monitor IV site   Discussed with RN   Palliative care consult /code status   Peptic ulcer disease prophylaxis   DVT prophylaxis    Problem List      Patient Active Problem List   Diagnosis    Acute kidney injury superimposed on CKD ([facility] [address]    Hypoglycemia    Thrombocytopenia (HCC)    Leukocytosis    Hyponatremia    Hypokalemia    Hypotension    Colitis    Acute cystitis with hematuria    Proctitis    Cholelithiasis    Hypomagnesemia    Altered mental status       HPI     Kasia Coffey is 80 y.o.,  male,  previous medical history chronic renal insufficiency, gastric bypass surgery with obesity, diabetes, chronic back pain, 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   He presented hospital for altered mental status. Patient stated that he may have overdosed on sleeping pills. He took 6 instead of 3. He had low blood sugars at 30 on arrival received D50. He had recurrent hypoglycemia received another D50W. He had poor oral intake apparently at home. He was confused. He had acute renal failure with elevated liver function tests and evidence of severe leukocytosis suggestive of sepsis. CT abdomen pelvis was done. He was initiated on antibiotics. He was admitted to ICU. I was contacted by ER physician later advised about him being admitted. the patient developed hypotension was initiated on Levophed at 8 mcg/minute . IV infiltrated. I was contacted by RN immediately came in to evaluate. Blood cultures grew E coli. Patient is confused not able to volunteer history. He is very hard of hearing. PMHx   Past Medical History      Diagnosis Date    Chronic back pain     Diabetes mellitus ([facility] [address]       Past Surgical History        Procedure Laterality Date    BARIATRIC SURGERY         Meds    Current Medications    midodrine  10 mg Oral TID WC    potassium bicarb-citric acid  20 mEq Oral 4x Daily    cefTRIAXone (ROCEPHIN) IV  2,000 mg IntraVENous Q24H    sodium chloride flush  5-40 mL IntraVENous 2 times per day     glucose, dextrose bolus **OR** dextrose bolus, glucagon (rDNA), dextrose, sodium chloride flush, sodium chloride, ondansetron **OR** ondansetron, acetaminophen **OR** acetaminophen  IV Drips/Infusions   norepinephrine 5 mcg/min (09/09/22 0932)    IV infusion builder 100 mL/hr at 09/09/22 1143    dextrose      sodium chloride 25 mL/hr at 09/08/22 2030     Home Medications  No medications prior to admission.     Allergies    Cefazolin  Social History     Social History     Socioeconomic History    Marital status:      Spouse name: Not on file    Number of children: Not on file    Years of education: Not on file    Highest education level: Not on file   Occupational History    Not on file   Tobacco Use    Smoking status: Unknown    Smokeless tobacco: Not on file   Substance and Sexual Activity    Alcohol use: Not Currently    Drug use: Not on file    Sexual activity: Not on file   Other Topics Concern    Not on file   Social History Narrative    Not on file     Social Determinants of Health     Financial Resource Strain: Not on file   Food Insecurity: Not on file   Transportation Needs: Not on file   Physical Activity: Not on file   Stress: Not on file   Social Connections: Not on file   Intimate Partner Violence: Not on file   Housing Stability: Not on file     Family History          Problem Relation Age of Onset    Heart Attack Sister      ROS - 11 systems    Attempted not possible due mental status  Vitals     height is 5' 9\" (1.753 m) and weight is 171 lb (77.6 kg). His temporal temperature is 97.2 °F (36.2 °C). His blood pressure is 99/56 (abnormal) and his pulse is 82. His respiration is 14 and oxygen saturation is 95%. Body mass index is 25.25 kg/m². I/O      Intake/Output Summary (Last 24 hours) at 9/9/2022 1329  Last data filed at 9/9/2022 0617  Gross per 24 hour   Intake 253.85 ml   Output 500 ml   Net -246.15 ml     I/O last 3 completed shifts: In: 253.9 [I.V.:6.2; IV Piggyback:247.6]  Out: 500 [Urine:500]   Patient Vitals for the past 96 hrs (Last 3 readings):   Weight   09/08/22 1830 171 lb (77.6 kg)   09/08/22 1243 200 lb (90.7 kg)     Exam   General Appearance  Awake, alert, oriented, in no acute distress  HEENT - Head is normocephalic, atraumatic. Pupil reactive to light jaundice  Neck - Supple, symmetrical, trachea midline and Soft, trachea midline and straight  Lungs -  clear to auscultation no crackles or wheezing  Cardiovascular - Heart sounds are normal.  Regular rhythm normal rate without murmur, gallop or rub.   Abdomen - Soft, nontender, nondistended, no masses or organomegaly  Neurologic - CN II-XII are grossly intact Except for significant hearing impairment There are no focal motor deficits  Skin - No bruising or bleeding jaundice  Extremities - No cyanosis, clubbing or edema    Labs  - Old records and notes have been reviewed in [facility]   CBC     Lab Results   Component Value Date/Time    WBC 47.9 09/09/2022 06:35 AM    RBC 3.33 09/09/2022 06:35 AM    HGB 10.2 09/09/2022 06:35 AM    HCT 29.6 09/09/2022 06:35 AM    PLT 96 09/09/2022 06:35 AM    MCV 88.9 09/09/2022 06:35 AM    MCH 30.6 09/09/2022 06:35 AM    MCHC 34.5 09/09/2022 06:35 AM    RDW 16.5 09/09/2022 06:35 AM    LYMPHOPCT 2 09/09/2022 06:35 AM    MONOPCT 3 09/09/2022 06:35 AM    BASOPCT 0 09/09/2022 06:35 AM    MONOSABS 1.44 09/09/2022 06:35 AM    LYMPHSABS 0.96 09/09/2022 06:35 AM    EOSABS 0.00 09/09/2022 06:35 AM    BASOSABS 0.00 09/09/2022 06:35 AM     BMP   Lab Results   Component Value Date/Time     09/09/2022 12:59 PM    K 3.4 09/09/2022 06:35 AM    CL 99 09/09/2022 06:35 AM    CO2 18 09/09/2022 06:35 AM    BUN 65 09/09/2022 06:35 AM    CREATININE 3.91 09/09/2022 06:35 AM    GLUCOSE 133 09/09/2022 06:35 AM    CALCIUM 7.3 09/09/2022 06:35 AM    MG 2.0 09/09/2022 06:35 AM     LFTS  Lab Results   Component Value Date/Time    ALKPHOS 153 09/09/2022 06:35 AM    ALT 20 09/09/2022 06:35 AM    AST 39 09/09/2022 06:35 AM    PROT 5.1 09/09/2022 06:35 AM    BILITOT 3.9 09/09/2022 06:35 AM    LABALBU 1.6 09/09/2022 06:35 AM       INR   Lab Results   Component Value Date    INR 1.9 09/09/2022    PROTIME 21.7 (H) 09/09/2022       Radiology     CT brain    No acute intracranial abnormality. Prominent CSF spaces over the frontal and parietal lobes. No evidence of   hydrocephalus. CT  abdomen pelvis    1. Diffuse wall thickening of the ascending and transverse colon may   represent colitis. 2. Questionable rectal wall thickening may represent proctitis. 3. Suspected cholelithiasis. Suboptimal evaluation of gallbladder fossa. Can correlate with right upper quadrant ultrasound.    4. Mild bladder wall thickening may be related to underdistension versus   possible cystitis. Can correlate with urinalysis. 5. Small amount of free fluid present around the liver and spleen. 6. Mild bilateral lower lobe patchy opacities are favored to represent   atelectasis. However, an early inflammatory/infectious process is not   excluded in the proper clinical setting. 7. Diffuse subcutaneous fat stranding may related to fluid overload. 8. Hypodense blood pool, may be related to the anemia. (See actual reports for details)    \"Thank you for asking us to see this patient\"    Case discussed with nurse and patient/family. Questions and concerns addressed.     Electronically signed by     Pooja Mehta MD on 9/9/2022 at 1:29 PM     Cc75 min

## 2022-09-09 NOTE — PROGRESS NOTES
Patient up to commode with 2 assist. Patient became unresponsive, SBP 49, writer called for help. Levophed drip started. Patient lifted back to bed and started to become more responsive.

## 2022-09-09 NOTE — CONSULTS
Renal Consult Note    Patient :  Hetal Sage; 80 y.o. MRN# [id number]  Location:  1110/1110-01  Attending:  Sandro Benitez MD  Admit Date:  9/8/2022   Hospital Day: 1    Reason for Consult:     Asked by Dr Sandro Benitez MD to see for HINA/Elevated Creatinine. History Obtained From:     non-family caregiver - staff, electronic medical record    History of Present Illness:     Hetal Sage; 80 y.o. male with past medical history as mentioned below. Known history of type 2 diabetes which is now diet controlled, chronic back pain, hypertension, chronic back pain. Also has an element of chronic kidney disease baseline creatinine 1.8-2.0. Has seen Dr. Jose Arias at [facility] before. In 2019 he was in [facility] for osteomyelitis of his leg. Was given prolonged courses of antibiotics. Creatinine then settled around 1.8-2.0 range. He is somewhat noncompliant with follow-ups. He was brought into the hospital yesterday by his family as he was lethargic and unresponsive. Family felt that he may have overdosed on sleeping pills. He was supposed to take 3 but instead took 6. When EMS arrived he was found to have blood sugar of 30. He was given an amp of dextrose and blood sugar in the ED was up to 60. In the ER he was found to be jaundiced, also hypotensive with blood pressure in the 71-30 systolic range. Lab work also showed that he had a creatinine of 4.3 BUN of 70 sodium of 127 bicarb of 17 total bilirubin 4.9 alkaline phosphatase of 200, abnormal liver enzymes, albumin 1.6 hemoglobin 10.2 platelets 96 white count 47. Chest x-ray was unremarkable, CT scan of the abdominal was done which showed diffuse thickening of descending and transverse colon along with rectal wall thickening and thickening in his bladder area. Cholelithiasis was suspected as well and gallbladder fossa was not adequately visualized. Medeiros cath was placed transferred to the ICU.   Placed on IV fluids, urine output has improved creatinine has come down to 3.9 acidosis persists. Blood cultures came back positive for E. coli 2 out of 2, urinalysis showed 5-10 WBCs. Previous work-up done at [facility] for acute kidney injury showed that he had negative YENNY, negative immunofixation negative ANCA titers negative anti-GBM slightly low C3 normal C4. At the time evaluation he is somewhat lethargic, is now on pressors. Oral intake suboptimal although is able to swallow appropriately. Nephrology has been consulted for acute kidney injury and hyponatremia. No history of recent contrast exposure, No h/o prolonged NSAIDs use in the past, No h/o nephrolithiasis, No recent skin rashes or arthralgias, No hematuria or pyuria noticed in the recent past. Doesn't report any reduction in the urine output recently. Non report of any obstructive urinary symptoms (urgency, frequency, weak stream, straining while urination). No h/o recurrent UTIs in the past.    Past History/Allergies? Social History:     Past Medical History:   Diagnosis Date    Chronic back pain     Diabetes mellitus (HCC)        Allergies   Allergen Reactions    Cefazolin Rash     maculopapular rash over his face, chest, torso, and back.        Social History     Socioeconomic History    Marital status:      Spouse name: Not on file    Number of children: Not on file    Years of education: Not on file    Highest education level: Not on file   Occupational History    Not on file   Tobacco Use    Smoking status: Unknown    Smokeless tobacco: Not on file   Substance and Sexual Activity    Alcohol use: Not Currently    Drug use: Not on file    Sexual activity: Not on file   Other Topics Concern    Not on file   Social History Narrative    Not on file     Social Determinants of Health     Financial Resource Strain: Not on file   Food Insecurity: Not on file   Transportation Needs: Not on file   Physical Activity: Not on file   Stress: Not on file   Social Connections: Not on file Intimate Partner Violence: Not on file   Housing Stability: Not on file       Family History:        Family History   Problem Relation Age of Onset    Heart Attack Sister        Outpatient Medications:     No medications prior to admission. Current Medications:     Scheduled Meds:    midazolam  4 mg IntraVENous Once    midodrine  10 mg Oral TID    sodium chloride flush  5-40 mL IntraVENous 2 times per day    metroNIDAZOLE  500 mg IntraVENous Q8H    ciprofloxacin  400 mg IntraVENous Q12H     Continuous Infusions:    norepinephrine 5 mcg/min (22 0932)    IV infusion builder      dextrose      sodium chloride 25 mL/hr at 22 2030     PRN Meds:  glucose, dextrose bolus **OR** dextrose bolus, glucagon (rDNA), dextrose, sodium chloride flush, sodium chloride, ondansetron **OR** ondansetron, acetaminophen **OR** acetaminophen    Review of Systems:     Constitutional: No fever, no chills, no lethargy, no weakness. HEENT:  No headache, otalgia, itchy eyes, nasal discharge or sore throat. Cardiac:  No chest pain, dyspnea, orthopnea or PND. Chest:  No cough, phlegm or wheezing. Abdomen:  No abdominal pain, nausea or vomiting. Neuro:  No focal weakness, abnormal movements orseizure like activity. Skin:   No rashes, no itching. :   No hematuria, no pyuria, no dysuria, no flank pain. Extremities:  No swelling or joint pains. ROS was otherwise negative except as mentioned in the [address] Input/Output:       I/O last 3 completed shifts:   In: 253.9 [I.V.:6.2; IV Piggyback:247.6]  Out: 500 [Urine:500]  Patient Vitals for the past 96 hrs (Last 3 readings):   Weight   22 1830 171 lb (77.6 kg)   22 1243 200 lb (90.7 kg)      Vital Signs:   Temperature:  Temp: 97.2 °F (36.2 °C)  TMax:   Temp (24hrs), Av.1 °F (36.2 °C), Min:96.8 °F (36 °C), Max:97.5 °F (36.4 °C)    Respirations:  Resp: 15  Pulse:   Heart Rate: 76  BP:    BP: (!) 100/45  BP Range: Systolic (48AKZ), XUK:95 , Min:49 , Max:110 Diastolic (25NBM), ULL:88, Min:26, Max:94      Physical Examination:     General:  Lethargic, arousable, no accessory muscle use. HEENT: Atraumatic, normocephalic, no throat congestion, dry mucous membranes, jaundice evident  Eyes:   Pupils equal, round and reactive to light,   Neck:   No JVD, no thyromegaly, no lymphadenopathy. Chest:   Bilateral vesicular breath sounds, no rales or wheezes. Cardiac:  S1 S2 RR, no murmurs, gallops or rubs, JVP not raised. Abdomen: Soft, non-tender, no masses or organomegaly, BS audible. :   No suprapubic or flank tenderness. Neuro:   Awake, follows commands, no deficits moves all 4 extremities   SKIN:  No rashes, no pruritus  Extremities:  Trace edema, sluggish peripheral pulses, no calf tenderness. Labs:       Recent Labs     09/08/22  1305 09/09/22  0635   WBC 49.3* 47.9*   RBC 3.29* 3.33*   HGB 10.2* 10.2*   HCT 29.6* 29.6*   MCV 90.0 88.9   MCH 31.0 30.6   MCHC 34.5 34.5   RDW 16.4* 16.5*   PLT 96* 96*   MPV 10.3 10.7      BMP:   Recent Labs     09/09/22  0035 09/09/22  0250 09/09/22  0635   * 123* 128*   K 3.7 3.5* 3.4*   CL 92* 92* 99   CO2 16* 16* 18*   BUN 65* 65* 65*   CREATININE 4.27* 4.21* 3.91*   GLUCOSE 147* 142* 133*   CALCIUM 7.4* 7.4* 7.3*      Phosphorus:   No results for input(s): PHOS in the last 72 hours.   Magnesium:    Recent Labs     09/08/22  1305 09/09/22  0635   MG 1.3* 2.0     Albumin:    Recent Labs     09/08/22  1305 09/09/22  0635   LABALBU 1.8* 1.6*     BNP:    No results found for: BNP  YENNY:    No results found for: YENNY  SPEP:  Lab Results   Component Value Date/Time    PROT 5.1 09/09/2022 06:35 AM     UPEP:   No results found for: LABPE  C3:   No results found for: C3  C4:   No results found for: C4  MPO ANCA:   No results found for: MPO  PR3 ANCA:   No results found for: PR3  Anti-GBM:   No results found for: GBMABIGG  Hep BsAg:       No results found for: HEPBSAG  Hep C AB:        No results found for: HEPCAB    Urinalysis/Chemistries:      Lab Results   Component Value Date/Time    NITRU NEGATIVE 09/08/2022 03:33 PM    COLORU Dark Yellow 09/08/2022 03:33 PM    PHUR 5.5 09/08/2022 03:33 PM    WBCUA 5 TO 10 09/08/2022 03:33 PM    RBCUA 20 TO 50 09/08/2022 03:33 PM    MUCUS NOT REPORTED 07/31/2019 07:58 AM    TRICHOMONAS NOT REPORTED 07/31/2019 07:58 AM    YEAST NOT REPORTED 07/31/2019 07:58 AM    BACTERIA MANY 09/08/2022 03:33 PM    SPECGRAV 1.015 09/08/2022 03:33 PM    LEUKOCYTESUR SMALL 09/08/2022 03:33 PM    UROBILINOGEN Normal 09/08/2022 03:33 PM    BILIRUBINUR  09/08/2022 03:33 PM     Presumptive positive. Unable to confirm due to unavailability of reagent. GLUCOSEU NEGATIVE 09/08/2022 03:33 PM    KETUA TRACE 09/08/2022 03:33 PM    AMORPHOUS NOT REPORTED 07/31/2019 07:58 AM     Urine Sodium:     Lab Results   Component Value Date/Time    SIMONE <20 09/08/2022 06:40 PM     Urine Potassium:  No results found for: KUR  Urine Chloride:  No results found for: CLUR  Urine Osmolarity:   Lab Results   Component Value Date/Time    OSMOU 207 09/08/2022 06:40 PM     Urine Protein:   No results found for: TPU  Urine Creatinine:     Lab Results   Component Value Date/Time    LABCREA 108.7 09/08/2022 03:33 PM     UPC:     Urine Eosinophils:  No components found for: UEOS    Radiology:     CXR:     Assessment:     1. Acute Kidney Injury: Secondary to ischemic acute liver necrosis from septic shock, low flow, intravascularly depletion from decreased intake, baseline creatinine 1.8-2.0 which peaked up to 4.3 down to 3.9, nonoliguric expected to improve  2. Chronic kidney disease stage IIIb-4 baseline 1.8-2.0. 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 Ultrasound showed right kidney to be 8.2 left to be 10 cm previous work-up for immune complex, pauci-immune, anti-GBM and paraprotein disease negative. Does not follow-up with any physician on a regular basis  3.   Septic shock likely from E. coli bacteremia, source unclear, high likelihood of it being from his gallbladder fossa given the fact he has obstructive jaundice, currently on antibiotics and pressors  4.  E. coli bacteremia source likely gallbladder given the fact his gallstones also has obstructive jaundice  5. Suspected obstructive jaundice with hyperbilirubinemia and increased alkaline phosphatase and cholelithiasis on CT scan  6. History of type 2 diabetes currently diet controlled  7. History of bariatric surgery  8. Hypovolemic hyponatremia resolving  9. Anion gap and nongap metabolic acidosis from acute kidney injury  10. Mild hypokalemia      Plan:   1. Change fluids to sterile water with 150 Millikin sodium bicarbonate 100 mill an hour  2. Replace potassium orally  3. Start ProAmatine 10 mg 3 times a day  4. Wean off pressors as tolerated   5. Leave Medeiros in as it was a difficult insertion. 6.  Keep systolic pressure more than 110  7. Strict intake output record and daily weights  8. Consider ID consult  9. Avoid nephrotoxic agents  10. We will follow with you    Nutrition   Please ensure that patient is on a renal diet/TF. Avoid nephrotoxic drugs/contrast exposure. Thank you for the consultation. Please do not hesitate to contact us for any further questions/concerns. We will continue to follow along with you.

## 2022-09-09 NOTE — PROGRESS NOTES
IV with levophed assessed and it has bruising and patient was having pain. Levophed stopped and Dr. Pham Flower notified. Orders for regitine and a central line was placed by Dr. Pham Flower. Patient required 2 mg of versed for line placement.

## 2022-09-09 NOTE — CONSULTS
GI Consult Note:    Name: Lindalee Skiff  MRN: [id number]     [place]: [de-identified]  Room: [address] [place] [address]    Admit Date: 9/8/2022  PCP: No primary care provider on file. Physician Requesting Consult: Joshua Kinney MD     Reason for Consult: Questionable colitis    Chief Complaint:     Chief Complaint   Patient presents with    Altered Mental Status       History Obtained From:     patient, electronic medical record, nursing staff    History of Present Illness:      Lindalee Skiff is a  80 y.o.  male who presents with Altered Mental Status      Symptoms:  Patient seen at [facility] intensive care unit. Patient was admitted from the emergency department with a history of altered mental status and hypotension. Apparently patient had altered mental status at home and he was drowsy and confused. When the paramedics arrived, his blood sugar was only 30. He was given D50 1 amp and was brought to the emergency department and was found to have blood sugars up to 60. He received another ampule of D50. On reviewing the chart it appears that this patient took more than usual sleeping medications. During my visit around 9 AM patient appears to be conscious, alert and oriented. He is very hard of hearing. He denied abdominal pain, bloating, nausea vomiting. No diarrhea. During my visit he was hypotensive and he is on low-dose Levophed. He has 2 blood cultures positive for E. coli bacteremia. He is also known to have UTI. WBC count elevated up to about 40,000. Most likely etiology for this appears to be UTI. Basing on conversation with the patient, he does not have any acute GI symptoms. No significant past history of GI issues. During my visit patient feels hungry. No nausea vomiting. He want to eat regular food. Discussed with the nursing staff and they did not voice any concerns.     Also I did review GI APRN notes which is as follows:      68-year-old gentleman with past medical history including gastric bypass brought to the hospital due to altered mental status at home per family  Upon admission patient was a found to have a UTI with blood in his urine, HINA with elevated BUN and creatinine, E. coli bacteremia x2 blood cultures. GI was consulted due to concerns of colitis identified on the CT scan and ascending and transverse colon. Significant leukocytosis on presentation with WBCs 49, hemoglobin 10.2, platelets 96, INR 1.9     Patient is very hard of hearing and somewhat of a poor historian     Currently patient denies any abdominal pain, nausea or vomiting, fevers or chills, diarrhea, rectal bleeding. Nurse confirms these things. Patient is hungry, resting comfortably in bed. Patient has been hypotensive and on low-dose levo  No previous EGD or colonoscopy on file     Recommendations:     Recommend conservative treatment at this time with IV fluids, oral liquid and diet intake  Maintain MAP greater than 65 to avoid ischemic colitis  If patient develops loose stool, please send for C. Difficile, GI molecular panel etc  Diet as tolerated from GI perspective  Continue supportive care  Recommend PT OT as tolerated  Incentive spirometer given advanced age, hospitalization and less mobility  Daily labs including CBC BMP. Transfuse as clinically indicated  Complete consult note to follow this afternoon per Dr. Prieto Wilkes      Past Medical History:     Past Medical History:   Diagnosis Date    Chronic back pain     Diabetes mellitus ([address]         Past Surgical History:     Past Surgical History:   Procedure Laterality Date    BARIATRIC SURGERY          Medications Prior to Admission:       Prior to Admission medications    Not on File        Allergies:       Cefazolin    Social History:     Tobacco:    has no history on file for tobacco use. Alcohol:      reports that he does not currently use alcohol. Drug Use: has no history on file for drug use.     Family History:     Family History   Problem Relation Age of Onset    Heart Attack Sister        Review of Systems:     Review of Systems   Constitutional:  Positive for activity change and fatigue. Negative for appetite change and fever. HENT:  Negative for mouth sores, sore throat, trouble swallowing and voice change. Eyes:         No ictirus   Respiratory:  Positive for shortness of breath. Negative for apnea, cough, choking, chest tightness and wheezing. Cardiovascular:  Negative for chest pain, palpitations and leg swelling. Gastrointestinal:  Negative for abdominal distention, abdominal pain, blood in stool, constipation, diarrhea, nausea and rectal pain. Endocrine: Negative for polydipsia, polyphagia and polyuria. Genitourinary:  Negative for frequency, hematuria and urgency. Musculoskeletal:  Positive for gait problem and myalgias. Negative for arthralgias, back pain and joint swelling. Skin:  Negative for pallor and rash. Allergic/Immunologic: Negative for food allergies. Neurological:  Positive for dizziness, weakness and light-headedness. Negative for seizures and headaches. Hematological:  Negative for adenopathy. Does not bruise/bleed easily. Psychiatric/Behavioral:  Negative for sleep disturbance. The patient is not nervous/anxious. Code Status:  Full Code    Physical Exam:     Vitals:  /63   Pulse (!) 101   Temp 97.2 °F (36.2 °C) (Temporal)   Resp 13   Ht 5' 9\" (1.753 m)   Wt 171 lb (77.6 kg)   SpO2 96%   BMI 25.25 kg/m²   Temp (24hrs), Av.1 °F (36.2 °C), Min:96.8 °F (36 °C), Max:97.2 °F (36.2 °C)      Physical Exam  Vitals and nursing note reviewed. Constitutional:       General: He is not in acute distress. Appearance: He is well-developed. Comments: Has signs of malnutrition. HENT:      Head: Normocephalic and atraumatic. Mouth/Throat:      Pharynx: No oropharyngeal exudate. Eyes:      General: No scleral icterus.      Conjunctiva/sclera: Conjunctivae normal. Pupils: Pupils are equal, round, and reactive to light. Neck:      Thyroid: No thyromegaly. Trachea: No tracheal deviation. Cardiovascular:      Rate and Rhythm: Normal rate and regular rhythm. Heart sounds: Normal heart sounds. No murmur heard. Pulmonary:      Effort: Pulmonary effort is normal. No respiratory distress. Breath sounds: Normal breath sounds. No wheezing or rales. Abdominal:      General: Bowel sounds are normal. There is no distension. Palpations: Abdomen is soft. There is no hepatomegaly or mass. Tenderness: There is no abdominal tenderness. There is no guarding. Hernia: No hernia is present. Comments: No peripheral signs of ch. Liver disease   Genitourinary:     Rectum: Normal.      Comments: Digital rectal examination revealed brown stools. Musculoskeletal:      Cervical back: Normal range of motion and neck supple. Lymphadenopathy:      Cervical: No cervical adenopathy. Skin:     General: Skin is warm and dry. Findings: No erythema or rash. Neurological:      Mental Status: He is alert and oriented to person, place, and time. Cranial Nerves: No cranial nerve deficit. Psychiatric:         Thought Content:  Thought content normal.     Data:   CBC:   Lab Results   Component Value Date/Time    WBC 47.9 09/09/2022 06:35 AM    RBC 3.33 09/09/2022 06:35 AM    HGB 10.2 09/09/2022 06:35 AM    HCT 29.6 09/09/2022 06:35 AM    MCV 88.9 09/09/2022 06:35 AM    MCH 30.6 09/09/2022 06:35 AM    MCHC 34.5 09/09/2022 06:35 AM    RDW 16.5 09/09/2022 06:35 AM    PLT 96 09/09/2022 06:35 AM    MPV 10.7 09/09/2022 06:35 AM     CBC with Differential:  Lab Results   Component Value Date/Time    WBC 47.9 09/09/2022 06:35 AM    RBC 3.33 09/09/2022 06:35 AM    HGB 10.2 09/09/2022 06:35 AM    HCT 29.6 09/09/2022 06:35 AM    PLT 96 09/09/2022 06:35 AM    MCV 88.9 09/09/2022 06:35 AM    MCH 30.6 09/09/2022 06:35 AM    MCHC 34.5 09/09/2022 06:35 AM    RDW 16.5 09/09/2022 06:35 AM    LYMPHOPCT 2 09/09/2022 06:35 AM    MONOPCT 3 09/09/2022 06:35 AM    BASOPCT 0 09/09/2022 06:35 AM    MONOSABS 1.44 09/09/2022 06:35 AM    LYMPHSABS 0.96 09/09/2022 06:35 AM    EOSABS 0.00 09/09/2022 06:35 AM    BASOSABS 0.00 09/09/2022 06:35 AM     Hemoglobin/Hematocrit:  Lab Results   Component Value Date/Time    HGB 10.2 09/09/2022 06:35 AM    HCT 29.6 09/09/2022 06:35 AM     CMP:    Lab Results   Component Value Date/Time     09/09/2022 12:59 PM    K 3.4 09/09/2022 06:35 AM    CL 99 09/09/2022 06:35 AM    CO2 18 09/09/2022 06:35 AM    BUN 65 09/09/2022 06:35 AM    CREATININE 3.91 09/09/2022 06:35 AM    GFRAA 18 09/09/2022 06:35 AM    LABGLOM 15 09/09/2022 06:35 AM    GLUCOSE 133 09/09/2022 06:35 AM    PROT 5.1 09/09/2022 06:35 AM    LABALBU 1.6 09/09/2022 06:35 AM    CALCIUM 7.3 09/09/2022 06:35 AM    BILITOT 3.9 09/09/2022 06:35 AM    ALKPHOS 153 09/09/2022 06:35 AM    AST 39 09/09/2022 06:35 AM    ALT 20 09/09/2022 06:35 AM     BMP:  Lab Results   Component Value Date/Time     09/09/2022 12:59 PM    K 3.4 09/09/2022 06:35 AM    CL 99 09/09/2022 06:35 AM    CO2 18 09/09/2022 06:35 AM    BUN 65 09/09/2022 06:35 AM    LABALBU 1.6 09/09/2022 06:35 AM    CREATININE 3.91 09/09/2022 06:35 AM    CALCIUM 7.3 09/09/2022 06:35 AM    GFRAA 18 09/09/2022 06:35 AM    LABGLOM 15 09/09/2022 06:35 AM    GLUCOSE 133 09/09/2022 06:35 AM     PT/INR:    Lab Results   Component Value Date/Time    PROTIME 21.7 09/09/2022 06:35 AM    INR 1.9 09/09/2022 06:35 AM     PTT:  No results found for: APTT, PTT[APTT}    Assesment:     Primary Problem  Acute kidney injury superimposed on CKD [facility])    Active Hospital Problems    Diagnosis Date Noted    Acute kidney injury superimposed on CKD ([facility] [address] [N17.9, N18.9] 09/08/2022     Priority: Medium    Hypoglycemia [E16.2] 09/08/2022     Priority: Medium    Thrombocytopenia ([facility] [address] [D69.6] 09/08/2022     Priority: Medium    Leukocytosis [D72.829] 09/08/2022 Priority: Medium    Hyponatremia [E87.1] 09/08/2022     Priority: Medium    Hypokalemia [E87.6] 09/08/2022     Priority: Medium    Hypotension [I95.9] 09/08/2022     Priority: Medium    Colitis [K52.9] 09/08/2022     Priority: Medium    Acute cystitis with hematuria [N30.01] 09/08/2022     Priority: Medium    Proctitis [K62.89] 09/08/2022     Priority: Medium    Cholelithiasis [K80.20] 09/08/2022     Priority: Medium    Hypomagnesemia [E83.42] 09/08/2022     Priority: Medium    Altered mental status [R41.82] 09/08/2022     Priority: Medium       Plan: This patient appears to have significant UTI. CT evidence of questionable colitis may be false positive finding. However given his WBC count need to follow the patient closely. Will start on liquid diet and slowly advance the diet. Discussed with the nursing staff to contact the physician if he develops any pain, distention, GI bleeding etc.  Once his general status improves, blood pressure stabilizes, may need colonoscopy to evaluate CT findings. Will follow the patient closely along with other team members. Thank you for allowing me to participate in the care of your patient. Please feel free to contact me with anyquestions or concerns. Electronically signed by Amrik De Leon MD on 9/9/2022 at 1:35 PM     Copy sent to Dr. Negrete primary care provider on file. Note is dictated utilizing voice recognition software. Unfortunately this leads to occasional typographical errors. Please contact our office if you have any questions.

## 2022-09-09 NOTE — PROGRESS NOTES
[facility]  Office: [address], DO, Chai Mariscale, DO, Belle Barcenaso, DO, Zenaida Trevino Blood, DO, Bo Padilla MD, Kalin Coburn MD, Ralf Gerard MD, Deanna Acharya MD,  Ines Feldman MD, Pamela Zamudio MD, Quinton Etienne, DO, Kerry Weller MD,  Abe Mohs, MD, Veronica Dunlap MD, Phong Aviles, DO, Nelsy Carlson MD, Viviana Avendaño MD, Orestes Nguyen MD, Jade Curry MD, Des Rockwell MD, Rupinder Foster MD, [place] Officer, DO, Stacy Cheung MD, Flaco Vargas MD, Darlene Sanchez, CNP,  Umesh Kay, CNP, Gearline Precise, CNP, Mary Nguyen, CNP, Maryann Gustafson PA-C, Mathew Morse, DNP, Maximo Geiger, CNP, Celsa Hinojosa, CNP, Carly Atkinson, CNP, Massimo Kerns, CNP, Daksha Tadeo, CNP, David Mendez, CNS, Suri Cruz, DNP, Tanmay Helms, CNP, Rach Murrell, CNP, [facility]      Daily Progress Note     Admit Date: 9/8/2022  Bed/Room No.  1110/1110-01  Admitting Physician : Ralf Gerard MD  Code Status :Full Code  Hospital Day:  LOS: 1 day   Chief Complaint:     Chief Complaint   Patient presents with    Altered Mental Status     Principal Problem:    Acute kidney injury superimposed on CKD [facility])  Active Problems:    Hypoglycemia    Thrombocytopenia (HCC)    Leukocytosis    Hyponatremia    Hypokalemia    Hypotension    Colitis    Acute cystitis with hematuria    Proctitis    Cholelithiasis    Hypomagnesemia    Altered mental status  Resolved Problems:    * No resolved hospital problems. *    Subjective : Interval History/Significant events :  09/09/22    Patient is having hypotension. He denies any abdominal pain, nausea, vomiting, breathing difficulty. Patient has chronic back pain. He has Medeiros catheter in place and urine output is fair. Vitals - Unstable afebrile  Labs - hyponatremia , elevated creatinine , hypokalemia . Lactic acid elevated. Low albumin.  Glucose elevated 134   Leucocytosis Nursing notes , Consults notes reviewed. Overnight events and updates discussed with Nursing staff . Background History:         Summer Boyd is 80 y.o. male  Who was admitted to the hospital on 9/8/2022 for treatment of Acute kidney injury superimposed on CKD ([facility] [address] Patient was brought to emergency room by ambulance after his wife called for patients change in mental status. Apparently patient overdosed on his sleep medication. He took medication within intent to fall asleep faster. Patient also reported that he has known history of arthritis and has been having difficulty with his joints. He was not moving much for last few days. He reports prior history of morbid obesity and weighted > 350 pounds. He underwent bariatric surgery > 25 years ago. He has prior history of diabetes mellitus however is not on any medication. Patient denies any other past medical history. On chart review it appears that patient has been admitted to [facility] with acute kidney injury and had elevated creatinine up to 3.5. Patient is very hard of hearing , he lives with his wife of > 35 Yrs. Patient denies any history of diarrhea, vomiting, nausea, fever. He denies any urinary difficulties, nocturia, retention. Initial evaluation emergency room showed hypotension with blood pressure 88/66 mmHg, blood glucose was significantly low 30's on first contact with EMS and repeat 60 on arrival to emergency room after dextrose. Lab evaluation showed hyponatremia 127, BUN 70, creatinine 4.34, bicarb 17, lactic acid 2.7, magnesium 1.3, troponin 58, albumin 1.8, AST 47, ALT 24, bilirubin 4.9. Alcohol levels negative. Urine tox was negative for cannabinoids, cocaine, methadone, fentanyl, oxycodone, amphetamine.   Patient was found to have significant leukocytosis 49.3 with low PLT 96 .  CT abdomen pelvis showed diffuse wall thickening in ascending, transverse colon suggestive of colitis, bladder wall thickening possible cystitis, suboptimal evaluation of gallbladder fossa. Patient has large right upper quadrant open surgical scar in place. Unsure about gallbladder status. He also has large midline surgical scar which she reports that is from bariatric surgery. PMH:  Past Medical History:   Diagnosis Date    Chronic back pain     Diabetes mellitus ([address]       Allergies: Allergies   Allergen Reactions    Cefazolin Rash     maculopapular rash over his face, chest, torso, and back. Medications :  midodrine, 10 mg, Oral, TID WC  potassium bicarb-citric acid, 20 mEq, Oral, 4x Daily  cefTRIAXone (ROCEPHIN) IV, 2,000 mg, IntraVENous, Q24H  sodium chloride flush, 5-40 mL, IntraVENous, 2 times per day        Review of Systems   Review of Systems   Constitutional:  Negative for activity change, appetite change, fatigue, fever and unexpected weight change. HENT:  Negative for congestion, nosebleeds, rhinorrhea, sinus pressure, sneezing and voice change. Respiratory:  Negative for cough, choking, chest tightness, shortness of breath and wheezing. Cardiovascular:  Negative for chest pain, palpitations and leg swelling. Gastrointestinal:  Negative for abdominal pain, constipation, diarrhea, nausea and vomiting. Genitourinary:  Negative for difficulty urinating, dysuria, frequency, penile discharge and testicular pain. Musculoskeletal:  Positive for back pain. Skin:  Negative for rash. Neurological:  Negative for dizziness, weakness, light-headedness and headaches. Hematological:  Does not bruise/bleed easily. Psychiatric/Behavioral:  Negative for agitation, behavioral problems, confusion, self-injury, sleep disturbance and suicidal ideas.     Objective :      Current Vitals : Temp: 97.2 °F (36.2 °C),  Heart Rate: 82, Resp: 14, BP: (!) 99/56, SpO2: 95 %  Last 24 Hrs Vitals   Patient Vitals for the past 24 hrs:   BP Temp Temp src Pulse Resp SpO2 Height Weight   09/09/22 1200 (!) 99/56 -- -- 82 14 95 % -- -- 09/09/22 1145 (!) 83/54 -- -- 85 17 98 % -- --   09/09/22 1130 99/61 -- -- 84 20 95 % -- --   09/09/22 1120 (!) 95/54 -- -- 81 20 97 % -- --   09/09/22 1115 107/61 -- -- 81 18 98 % -- --   09/09/22 1110 (!) 106/58 -- -- 81 15 97 % -- --   09/09/22 1105 (!) 108/58 -- -- 78 13 98 % -- --   09/09/22 1100 (!) 84/53 -- -- 79 20 97 % -- --   09/09/22 1055 (!) 78/46 -- -- 88 17 -- -- --   09/09/22 1010 (!) 100/45 -- -- 76 15 98 % -- --   09/09/22 1005 (!) 98/53 -- -- 76 15 98 % -- --   09/09/22 1000 (!) 88/50 -- -- 75 15 97 % -- --   09/09/22 0955 (!) 80/41 -- -- 81 24 91 % -- --   09/09/22 0950 (!) 70/34 -- -- 89 19 92 % -- --   09/09/22 0945 (!) 110/94 -- -- 85 19 93 % -- --   09/09/22 0940 (!) 52/26 -- -- 85 19 90 % -- --   09/09/22 0935 (!) 55/37 -- -- 90 29 -- -- --   09/09/22 0930 (!) 49/31 -- -- 90 19 -- -- --   09/09/22 0925 (!) 52/35 -- -- (!) 101 17 -- -- --   09/09/22 0900 (!) 96/55 -- -- 94 21 96 % -- --   09/09/22 0800 (!) 87/52 -- -- 83 18 99 % -- --   09/09/22 0730 (!) 83/53 -- -- 83 18 96 % -- --   09/09/22 0700 (!) 79/53 97.2 °F (36.2 °C) Temporal 82 18 98 % -- --   09/09/22 0600 (!) 91/54 -- -- 82 16 97 % -- --   09/09/22 0544 -- 97.1 °F (36.2 °C) Temporal -- -- -- -- --   09/09/22 0500 (!) 83/54 -- -- 78 16 97 % -- --   09/09/22 0400 (!) 85/55 -- -- 80 16 97 % -- --   09/09/22 0300 (P) 86/62 -- -- 82 (P) 21 96 % -- --   09/09/22 0200 (!) 89/54 -- -- 81 15 98 % -- --   09/09/22 0100 (!) 86/55 -- -- 84 18 98 % -- --   09/09/22 0000 (!) 92/53 97.1 °F (36.2 °C) Temporal 81 16 98 % -- --   09/08/22 2300 (!) 91/55 -- -- 79 13 98 % -- --   09/08/22 2200 (!) 98/56 -- -- 79 13 100 % -- --   09/08/22 2106 -- -- -- 81 16 99 % -- --   09/08/22 2100 (!) 105/58 96.8 °F (36 °C) Temporal 80 12 98 % -- --   09/08/22 2000 -- -- -- 83 -- -- -- --   09/08/22 1830 -- -- -- -- -- -- 5' 9\" (1.753 m) 171 lb (77.6 kg)   09/08/22 1800 107/67 -- -- 85 16 96 % -- --   09/08/22 1732 (!) 93/47 -- -- 81 27 96 % -- --   09/08/22 1714 (!) 93/56 -- -- 83 15 99 % -- --   09/08/22 1704 (!) 100/56 -- -- 86 24 98 % -- --   09/08/22 1639 (!) 93/52 -- -- 82 17 98 % -- --   09/08/22 1554 -- -- -- 85 18 (!) 89 % -- --     Intake / output   09/07 1901 - 09/09 0700  In: 253.9 [I.V.:6.2]  Out: 500 [Urine:500]  Physical Exam:  Physical Exam  Vitals and nursing note reviewed. Constitutional:       General: He is not in acute distress. Appearance: He is not diaphoretic. HENT:      Head: Normocephalic and atraumatic. Nose:      Right Sinus: No maxillary sinus tenderness or frontal sinus tenderness. Left Sinus: No maxillary sinus tenderness or frontal sinus tenderness. Mouth/Throat:      Pharynx: No oropharyngeal exudate. Eyes:      General: No scleral icterus. Conjunctiva/sclera: Conjunctivae normal.      Pupils: Pupils are equal, round, and reactive to light. Neck:      Thyroid: No thyromegaly. Vascular: No JVD. Cardiovascular:      Rate and Rhythm: Normal rate and regular rhythm. Pulses:           Dorsalis pedis pulses are 2+ on the right side and 2+ on the left side. Heart sounds: Normal heart sounds. No murmur heard. Pulmonary:      Effort: Pulmonary effort is normal.      Breath sounds: Normal breath sounds. No wheezing or rales. Abdominal:      Palpations: Abdomen is soft. There is no mass. Tenderness: There is no abdominal tenderness. Comments: Surgical Scar    Musculoskeletal:      Cervical back: Full passive range of motion without pain and neck supple. Lymphadenopathy:      Head:      Right side of head: No submandibular adenopathy. Left side of head: No submandibular adenopathy. Cervical: No cervical adenopathy. Skin:     General: Skin is warm. Neurological:      Mental Status: He is alert and oriented to person, place, and time. Motor: No tremor. Psychiatric:         Behavior: Behavior is cooperative.          Laboratory findings:    Recent Labs     09/08/22  1305 09/09/22  0635   WBC 49.3* 47.9*   HGB 10.2* 10.2*   HCT 29.6* 29.6*   PLT 96* 96*   INR  --  1.9     Recent Labs     09/08/22  1305 09/09/22  0035 09/09/22  0250 09/09/22  0635 09/09/22  1144 09/09/22  1259   * 123* 123* 128*  --  127*   K 3.4* 3.7 3.5* 3.4*  --   --    CL 95* 92* 92* 99  --   --    CO2 17* 16* 16* 18*  --   --    GLUCOSE 62* 147* 142* 133*  --   --    BUN 70* 65* 65* 65*  --   --    CREATININE 4.34* 4.27* 4.21* 3.91*  --   --    MG 1.3*  --   --  2.0  --   --    CALCIUM 7.6* 7.4* 7.4* 7.3*  --   --    PHOS  --   --   --   --  3.9  --      Recent Labs     09/08/22  1305 09/08/22  1751 09/09/22  0635   PROT 5.6*  --  5.1*   LABALBU 1.8*  --  1.6*   TSH 2.98  --   --    AST 47*  --  39   ALT 24  --  20   ALKPHOS 188*  --  153*   BILITOT 4.9*  --  3.9*   AMMONIA 60  --   --    CKTOTAL  --  152  --           Specific Gravity, UA   Date Value Ref Range Status   09/08/2022 1.015 1.005 - 1.030 Final     Protein, UA   Date Value Ref Range Status   09/08/2022 1+ (A) NEGATIVE Final     RBC, UA   Date Value Ref Range Status   09/08/2022 20 TO 50 0 - 2 /HPF Final     Bacteria, UA   Date Value Ref Range Status   09/08/2022 MANY (A) None Final     Nitrite, Urine   Date Value Ref Range Status   09/08/2022 NEGATIVE NEGATIVE Final     WBC, UA   Date Value Ref Range Status   09/08/2022 5 TO 10 0 - 5 /HPF Final     Leukocyte Esterase, Urine   Date Value Ref Range Status   09/08/2022 SMALL (A) NEGATIVE Final       Imaging / Clinical Data :-   CT HEAD WO CONTRAST    Result Date: 9/8/2022  No acute intracranial abnormality. Prominent CSF spaces over the frontal and parietal lobes. No evidence of hydrocephalus. US GALLBLADDER RUQ    Result Date: 9/9/2022  1. Technically limited exam.  The gallbladder is not clearly identified. Based on recent CT exam, the gallbladder was relatively contracted with intermediate density fluid and stones. 2.  No evidence for biliary dilatation.  3.  Morphologic findings suggestive of cirrhosis with small perihepatic ascites and probable cyst.     XR CHEST PORTABLE    Result Date: 9/9/2022  1. Right internal jugular line terminates at the superior caval atrial junction. 2.  Shallow inflation with more prominent perihilar opacities. Similar appearance of peribronchial wall thickening. While this in part may represent atelectasis, developing edema should be considered. XR CHEST 1 VIEW    Result Date: 9/8/2022  Suboptimal evaluation secondary to patient positioning/rotation. No radiographic evidence of an acute cardiopulmonary process. US RETROPERITONEAL COMPLETE    Result Date: 9/8/2022  Unremarkable ultrasound of the kidneys and urinary bladder. CT CHEST ABDOMEN PELVIS WO CONTRAST    Result Date: 9/8/2022  1. Diffuse wall thickening of the ascending and transverse colon may represent colitis. 2. Questionable rectal wall thickening may represent proctitis. 3. Suspected cholelithiasis. Suboptimal evaluation of gallbladder fossa. Can correlate with right upper quadrant ultrasound. 4. Mild bladder wall thickening may be related to underdistension versus possible cystitis. Can correlate with urinalysis. 5. Small amount of free fluid present around the liver and spleen. 6. Mild bilateral lower lobe patchy opacities are favored to represent atelectasis. However, an early inflammatory/infectious process is not excluded in the proper clinical setting. 7. Diffuse subcutaneous fat stranding may related to fluid overload. 8. Hypodense blood pool, may be related to the anemia. Clinical Course : stable  Assessment and Plan  :        Acute renal failure likely has underlying chronic kidney disease stage III. Unsure of her baseline likely 1.5-2.0. IV fluids with bicarb infusion, maintain Medeiros catheter. Kidney ultrasound does not show any evidence of medical renal disease. Likely has ischemic ATN from hypotension and dehydration. follow Culture sensitivity.  Nephrology following , continue urinary catheter. Gram negative Sepsis likely secondary to colitis-IV antibiotics, change to Rocephin 2 Gm daily . Monitor Lactic acid , consult GI . Underlying type 2 diabetes mellitus with hypoglycemia - Not on medications. Acute Cystitis - On antibiotics   Drug overdose-accidental -   Severe hypoglycemia -dextrose infusion, monitor blood sugars every 2 Hrs for now . Moderate malnutrition -       Continue to monitor vitals , Intake / output ,  Cell count , HGB , Kidney function, oxygenation  as indicated . Plan and updates discussed with patient ,  answers  explained to satisfaction.    Plan discussed with Staff Sergio Morgan RN     (Please note that portions of this note were completed with a voice recognition program. Efforts were made to edit the dictations but occasionally words are mis-transcribed.)      Leana Padilla MD  9/9/2022

## 2022-09-09 NOTE — PROCEDURES
Joon Kirby is a 80 y.o. male patient. 1. Altered mental status, unspecified altered mental status type    2. Hypoglycemia    3. Hypocalcemia    4. Hypokalemia    5. Hypomagnesemia    6. Hyponatremia    7. Acute kidney injury superimposed on CKD ([facility] [address]    8. Lactic acidosis    9. Acute cystitis with hematuria    10. Colitis    11. Proctitis    12. Calculus of gallbladder without cholecystitis without obstruction      Past Medical History:   Diagnosis Date    Chronic back pain     Diabetes mellitus ([facility] [address]      Blood pressure 112/63, pulse (!) 101, temperature 97.2 °F (36.2 °C), temperature source Temporal, resp. rate 13, height 5' 9\" (1.753 m), weight 171 lb (77.6 kg), SpO2 96 %. Central Line    Date/Time: 9/9/2022 1:44 PM  Performed by: Stephanie Ballesteros MD  Authorized by: Stephanie Ballesteros MD   Consent: The procedure was performed in an emergent situation. Verbal consent obtained.   Risks and benefits: risks, benefits and alternatives were discussed  Patient identity confirmed: arm band and provided demographic data  Indications: vascular access    Anesthesia:  Local Anesthetic: lidocaine 1% without epinephrine  Preparation: skin prepped with ChloraPrep  Skin prep agent dried: skin prep agent completely dried prior to procedure  Sterile barriers: all five maximum sterile barriers used - cap, mask, sterile gown, sterile gloves, and large sterile sheet  Hand hygiene: hand hygiene performed prior to central venous catheter insertion  Location details: right internal jugular  Patient position: Trendelenburg  Catheter type: triple lumen  Pre-procedure: landmarks identified  Ultrasound guidance: yes  Sterile ultrasound techniques: sterile gel and sterile probe covers were used  Number of attempts: 1  Successful placement: yes  Post-procedure: line sutured  Assessment: blood return through all ports  Patient tolerance: patient tolerated the procedure well with no immediate complications        Celestine Jimenez MD  9/9/2022

## 2022-09-09 NOTE — PROGRESS NOTES
Patient is consistently A&O to self and time, but not to place or situation. Patient denies any pain; VS stable at this time and patient has adequate urine output via blanca. Patient's Na lab is 123, down from 127 at 13:00 yesterday. Nephrology notified. Per Dr Milana Ivey, recheck BMP.

## 2022-09-09 NOTE — PROGRESS NOTES
Spoke with Dr El Sermasoud - Na recheck was 123, K is 3.5; per Dr Nikko Aguirre, continue to monitor patient and recheck labs in the morning.

## 2022-09-09 NOTE — PLAN OF CARE
Problem: Pain  Goal: Verbalizes/displays adequate comfort level or baseline comfort level  9/9/2022 0609 by Trip Muro RN  Outcome: Progressing  Flowsheets (Taken 9/9/2022 0609)  Verbalizes/displays adequate comfort level or baseline comfort level:   Encourage patient to monitor pain and request assistance   Assess pain using appropriate pain scale  9/9/2022 0608 by Trip Muro RN  Outcome: Progressing     Problem: Safety - Adult  Goal: Free from fall injury  Outcome: Progressing  Note: Patient educated on fall safety, and is free from falls and injuries at this time

## 2022-09-09 NOTE — PLAN OF CARE
GI consult note    71-year-old gentleman with past medical history including gastric bypass brought to the hospital due to altered mental status at home per family  Upon admission patient was a found to have a UTI with blood in his urine, HINA with elevated BUN and creatinine, E. coli bacteremia x2 blood cultures. GI was consulted due to concerns of colitis identified on the CT scan and ascending and transverse colon. Significant leukocytosis on presentation with WBCs 49, hemoglobin 10.2, platelets 96, INR 1.9    Patient is very hard of hearing and somewhat of a poor historian    Currently patient denies any abdominal pain, nausea or vomiting, fevers or chills, diarrhea, rectal bleeding. Nurse confirms these things. Patient is hungry, resting comfortably in bed. Patient has been hypotensive and on low-dose levo  No previous EGD or colonoscopy on file    Recommendations:    Recommend conservative treatment at this time with IV fluids, oral liquid and diet intake  Maintain MAP greater than 65 to avoid ischemic colitis  If patient develops loose stool, please send for C. Difficile, GI molecular panel etc  Diet as tolerated from GI perspective  Continue supportive care  Recommend PT OT as tolerated  Incentive spirometer given advanced age, hospitalization and less mobility  Daily labs including CBC BMP.   Transfuse as clinically indicated  Complete consult note to follow this afternoon per Dr. Krunal Couch, [facility]

## 2022-09-09 NOTE — PROGRESS NOTES
End Of Shift Note  [address] ICU  Summary of shift: Patient A&O to self and time; disoriented to place and situation. Patient's urine output 500mL overnight.  Sodium lab 123 overnight; nephrology notified    Vitals:    Vitals:    09/09/22 0400 09/09/22 0500 09/09/22 0544 09/09/22 0600   BP: (!) 85/55 (!) 83/54  (!) 91/54   Pulse: 80 78  82   Resp: 16 16  16   Temp:   97.1 °F (36.2 °C)    TempSrc:   Temporal    SpO2: 97% 97%  97%   Weight:       Height:            I&O:   Intake/Output Summary (Last 24 hours) at 9/9/2022 0654  Last data filed at 9/9/2022 0617  Gross per 24 hour   Intake 253.85 ml   Output 500 ml   Net -246.15 ml       Resp Status: RA    Ventilator Settings:     / / /     Critical Care IV infusions:   dextrose      IV infusion builder 125 mL/hr at 09/09/22 9463    sodium chloride 25 mL/hr at 09/08/22 2030        LDA:   Peripheral IV 09/08/22 Left Antecubital (Active)   Number of days: 0       Peripheral IV 09/08/22 Proximal;Right Forearm (Active)   Number of days: 0       Urinary Catheter 09/08/22 Coude (Active)   Number of days: 0

## 2022-09-10 PROBLEM — R10.9 ABDOMINAL PAIN: Status: ACTIVE | Noted: 2022-09-10

## 2022-09-10 PROBLEM — R79.89 ELEVATED LFTS: Status: ACTIVE | Noted: 2022-09-10

## 2022-09-10 LAB
ABSOLUTE EOS #: 0 K/UL (ref 0–0.4)
ABSOLUTE EOS #: 0 K/UL (ref 0–0.4)
ABSOLUTE IMMATURE GRANULOCYTE: 0.99 K/UL (ref 0–0.3)
ABSOLUTE IMMATURE GRANULOCYTE: 2.17 K/UL (ref 0–0.3)
ABSOLUTE LYMPH #: 0.49 K/UL (ref 1–4.8)
ABSOLUTE LYMPH #: 1.09 K/UL (ref 1–4.8)
ABSOLUTE MONO #: 1.48 K/UL (ref 0.1–0.8)
ABSOLUTE MONO #: 1.63 K/UL (ref 0.2–0.8)
ALBUMIN SERPL-MCNC: 1.8 G/DL (ref 3.5–5.2)
ALP BLD-CCNC: 171 U/L (ref 40–129)
ALT SERPL-CCNC: 23 U/L (ref 5–41)
ANION GAP SERPL CALCULATED.3IONS-SCNC: 10 MMOL/L (ref 9–17)
ANION GAP SERPL CALCULATED.3IONS-SCNC: 11 MMOL/L (ref 9–17)
AST SERPL-CCNC: 43 U/L
BASOPHILS # BLD: 0 %
BASOPHILS # BLD: 0 % (ref 0–2)
BASOPHILS ABSOLUTE: 0 K/UL (ref 0–0.2)
BASOPHILS ABSOLUTE: 0 K/UL (ref 0–0.2)
BILIRUB SERPL-MCNC: 4 MG/DL (ref 0.3–1.2)
BUN BLDV-MCNC: 56 MG/DL (ref 8–23)
BUN BLDV-MCNC: 61 MG/DL (ref 8–23)
BUN/CREAT BLD: 16 (ref 9–20)
BUN/CREAT BLD: 17 (ref 9–20)
CALCIUM SERPL-MCNC: 7.7 MG/DL (ref 8.6–10.4)
CALCIUM SERPL-MCNC: 7.8 MG/DL (ref 8.6–10.4)
CHLORIDE BLD-SCNC: 90 MMOL/L (ref 98–107)
CHLORIDE BLD-SCNC: 91 MMOL/L (ref 98–107)
CO2: 26 MMOL/L (ref 20–31)
CO2: 26 MMOL/L (ref 20–31)
CREAT SERPL-MCNC: 3.38 MG/DL (ref 0.7–1.2)
CREAT SERPL-MCNC: 3.82 MG/DL (ref 0.7–1.2)
CREATININE URINE: 42.2 MG/DL (ref 39–259)
CULTURE: ABNORMAL
EOSINOPHILS RELATIVE PERCENT: 0 % (ref 1–4)
EOSINOPHILS RELATIVE PERCENT: 0 % (ref 1–4)
GFR AFRICAN AMERICAN: 18 ML/MIN
GFR AFRICAN AMERICAN: 21 ML/MIN
GFR NON-AFRICAN AMERICAN: 15 ML/MIN
GFR NON-AFRICAN AMERICAN: 17 ML/MIN
GFR SERPL CREATININE-BSD FRML MDRD: ABNORMAL ML/MIN/{1.73_M2}
GFR SERPL CREATININE-BSD FRML MDRD: ABNORMAL ML/MIN/{1.73_M2}
GLUCOSE BLD-MCNC: 83 MG/DL (ref 75–110)
GLUCOSE BLD-MCNC: 84 MG/DL (ref 75–110)
GLUCOSE BLD-MCNC: 87 MG/DL (ref 75–110)
GLUCOSE BLD-MCNC: 89 MG/DL (ref 70–99)
GLUCOSE BLD-MCNC: 97 MG/DL (ref 70–99)
HCT VFR BLD CALC: 31.1 % (ref 40.7–50.3)
HCT VFR BLD CALC: 32.1 % (ref 40.7–50.3)
HEMOGLOBIN: 10.6 G/DL (ref 13–17)
HEMOGLOBIN: 11.3 G/DL (ref 13–17)
IMMATURE GRANULOCYTES: 2 %
IMMATURE GRANULOCYTES: 4 %
INR BLD: 1.7
LYMPHOCYTES # BLD: 1 % (ref 24–44)
LYMPHOCYTES # BLD: 2 % (ref 24–44)
Lab: ABNORMAL
Lab: ABNORMAL
MAGNESIUM: 1.8 MG/DL (ref 1.6–2.6)
MCH RBC QN AUTO: 30.2 PG (ref 25.2–33.5)
MCH RBC QN AUTO: 30.7 PG (ref 25.2–33.5)
MCHC RBC AUTO-ENTMCNC: 34.1 G/DL (ref 28.4–34.8)
MCHC RBC AUTO-ENTMCNC: 35.2 G/DL (ref 28.4–34.8)
MCV RBC AUTO: 85.8 FL (ref 82.6–102.9)
MCV RBC AUTO: 90.1 FL (ref 82.6–102.9)
MONOCYTES # BLD: 3 % (ref 1–7)
MONOCYTES # BLD: 3 % (ref 1–7)
MORPHOLOGY: ABNORMAL
MORPHOLOGY: ABNORMAL
NRBC AUTOMATED: 0 PER 100 WBC
NRBC AUTOMATED: 0 PER 100 WBC
PDW BLD-RTO: 16.2 % (ref 11.8–14.4)
PDW BLD-RTO: 17 % (ref 11.8–14.4)
PLATELET # BLD: 105 K/UL (ref 138–453)
PLATELET # BLD: 111 K/UL (ref 138–453)
PMV BLD AUTO: 10 FL (ref 8.1–13.5)
PMV BLD AUTO: 11.1 FL (ref 8.1–13.5)
POTASSIUM SERPL-SCNC: 4.3 MMOL/L (ref 3.7–5.3)
POTASSIUM SERPL-SCNC: 4.4 MMOL/L (ref 3.7–5.3)
PROTHROMBIN TIME: 20.3 SEC (ref 11.5–14.2)
RBC # BLD: 3.45 M/UL (ref 4.21–5.77)
RBC # BLD: 3.74 M/UL (ref 4.21–5.77)
SEG NEUTROPHILS: 91 % (ref 36–66)
SEG NEUTROPHILS: 94 % (ref 36–66)
SEGMENTED NEUTROPHILS ABSOLUTE COUNT: 46.34 K/UL (ref 1.8–7.7)
SEGMENTED NEUTROPHILS ABSOLUTE COUNT: 49.41 K/UL (ref 1.8–7.7)
SODIUM BLD-SCNC: 126 MMOL/L (ref 135–144)
SODIUM BLD-SCNC: 128 MMOL/L (ref 135–144)
SPECIMEN DESCRIPTION: ABNORMAL
TOTAL PROTEIN: 5.7 G/DL (ref 6.4–8.3)
WBC # BLD: 49.3 K/UL (ref 3.5–11.3)
WBC # BLD: 54.3 K/UL (ref 3.5–11.3)

## 2022-09-10 PROCEDURE — 84156 ASSAY OF PROTEIN URINE: CPT

## 2022-09-10 PROCEDURE — 2580000003 HC RX 258: Performed by: NURSE PRACTITIONER

## 2022-09-10 PROCEDURE — 6360000002 HC RX W HCPCS: Performed by: INTERNAL MEDICINE

## 2022-09-10 PROCEDURE — A4216 STERILE WATER/SALINE, 10 ML: HCPCS | Performed by: INTERNAL MEDICINE

## 2022-09-10 PROCEDURE — 80053 COMPREHEN METABOLIC PANEL: CPT

## 2022-09-10 PROCEDURE — 2000000000 HC ICU R&B

## 2022-09-10 PROCEDURE — 6370000000 HC RX 637 (ALT 250 FOR IP): Performed by: INTERNAL MEDICINE

## 2022-09-10 PROCEDURE — C9113 INJ PANTOPRAZOLE SODIUM, VIA: HCPCS | Performed by: INTERNAL MEDICINE

## 2022-09-10 PROCEDURE — 85025 COMPLETE CBC W/AUTO DIFF WBC: CPT

## 2022-09-10 PROCEDURE — 82570 ASSAY OF URINE CREATININE: CPT

## 2022-09-10 PROCEDURE — 6360000002 HC RX W HCPCS: Performed by: NURSE PRACTITIONER

## 2022-09-10 PROCEDURE — APPSS30 APP SPLIT SHARED TIME 16-30 MINUTES: Performed by: NURSE PRACTITIONER

## 2022-09-10 PROCEDURE — 36415 COLL VENOUS BLD VENIPUNCTURE: CPT

## 2022-09-10 PROCEDURE — 82947 ASSAY GLUCOSE BLOOD QUANT: CPT

## 2022-09-10 PROCEDURE — 99254 IP/OBS CNSLTJ NEW/EST MOD 60: CPT | Performed by: NURSE PRACTITIONER

## 2022-09-10 PROCEDURE — 2580000003 HC RX 258: Performed by: INTERNAL MEDICINE

## 2022-09-10 PROCEDURE — 80048 BASIC METABOLIC PNL TOTAL CA: CPT

## 2022-09-10 PROCEDURE — 85610 PROTHROMBIN TIME: CPT

## 2022-09-10 PROCEDURE — 83735 ASSAY OF MAGNESIUM: CPT

## 2022-09-10 RX ORDER — SODIUM CHLORIDE 9 MG/ML
INJECTION, SOLUTION INTRAVENOUS CONTINUOUS
Status: DISCONTINUED | OUTPATIENT
Start: 2022-09-10 | End: 2022-09-15 | Stop reason: ALTCHOICE

## 2022-09-10 RX ADMIN — MIDODRINE HYDROCHLORIDE 10 MG: 10 TABLET ORAL at 12:47

## 2022-09-10 RX ADMIN — POTASSIUM BICARBONATE 20 MEQ: 782 TABLET, EFFERVESCENT ORAL at 09:02

## 2022-09-10 RX ADMIN — POTASSIUM BICARBONATE 20 MEQ: 782 TABLET, EFFERVESCENT ORAL at 20:26

## 2022-09-10 RX ADMIN — POTASSIUM BICARBONATE 20 MEQ: 782 TABLET, EFFERVESCENT ORAL at 12:47

## 2022-09-10 RX ADMIN — SODIUM CHLORIDE, PRESERVATIVE FREE 10 ML: 5 INJECTION INTRAVENOUS at 09:02

## 2022-09-10 RX ADMIN — MIDODRINE HYDROCHLORIDE 10 MG: 10 TABLET ORAL at 17:23

## 2022-09-10 RX ADMIN — POTASSIUM BICARBONATE 20 MEQ: 782 TABLET, EFFERVESCENT ORAL at 17:23

## 2022-09-10 RX ADMIN — CEFTRIAXONE SODIUM 1000 MG: 1 INJECTION, POWDER, FOR SOLUTION INTRAMUSCULAR; INTRAVENOUS at 12:49

## 2022-09-10 RX ADMIN — SODIUM CHLORIDE: 9 INJECTION, SOLUTION INTRAVENOUS at 17:27

## 2022-09-10 RX ADMIN — SODIUM CHLORIDE 40 MG: 9 INJECTION, SOLUTION INTRAMUSCULAR; INTRAVENOUS; SUBCUTANEOUS at 09:02

## 2022-09-10 RX ADMIN — SODIUM CHLORIDE: 9 INJECTION, SOLUTION INTRAVENOUS at 08:17

## 2022-09-10 RX ADMIN — MIDODRINE HYDROCHLORIDE 10 MG: 10 TABLET ORAL at 09:02

## 2022-09-10 ASSESSMENT — ENCOUNTER SYMPTOMS
RHINORRHEA: 0
WHEEZING: 0
SINUS PRESSURE: 0
BACK PAIN: 1
DIARRHEA: 0
NAUSEA: 0
SHORTNESS OF BREATH: 0
VOMITING: 0
VOICE CHANGE: 0
CHOKING: 0
COUGH: 0
CHEST TIGHTNESS: 0
ABDOMINAL PAIN: 0
CONSTIPATION: 0

## 2022-09-10 NOTE — PROGRESS NOTES
[facility]  Office: [address], DO, Charleeconchis Bingham, DO, Celena Gertrudis, DO, Martin Shea Blood, DO, Dotty Chris MD, Ben Duque MD, Carmen Hopson MD, Sanjuanita Sanchez MD,  Estela Latham MD, Darryn Montoya MD, Bj Castorena, DO, Humberto De La Cruz MD,  Alyssa Guevara MD, Darren Higuera MD, Tito Ramey, DO, Antonio Edwards MD, Shweta Vega MD, Emily Marquez MD, Regi Ortiz MD, Benjamin Cerda MD, Leidy Abrams MD, Jorje Mobley, DO, Renu Merida MD, Bruce Reyes MD, Alferd Felty, CNP,  Savanna Barrow, CNP, Ethyl Mercury, CNP, Jazmyn Ríos, CNP, Oliva Cannon, PA-C, Rachna Reyse, DNP, Wesly Rios, CNP, Chadwick Parks, CNP, Mauricio Brittle, CNP, Britni Campos, CNP, Eva Yan, CNP, Yudelka Minor, CNS, Dane Randle, DNP, Satish Whitman, CNP, Gabi Farfan, CNP, [facility]      Daily Progress Note     Admit Date: 9/8/2022  Bed/Room No.  1110/1110-01  Admitting Physician : Carmen Hopson MD  Code Status :Full Code  Hospital Day:  LOS: 2 days   Chief Complaint:     Chief Complaint   Patient presents with    Altered Mental Status     Principal Problem:    Acute kidney injury superimposed on CKD [facility])  Active Problems:    Hypoglycemia    Thrombocytopenia (HCC)    Leukocytosis    Hyponatremia    Hypokalemia    Hypotension    Colitis    Acute cystitis with hematuria    Proctitis    Cholelithiasis    Hypomagnesemia    Altered mental status  Resolved Problems:    * No resolved hospital problems. *    Subjective : Interval History/Significant events :  09/10/22    Patient does not have any acute symptoms for me. He denies any abdominal pain, nausea, vomiting, breathing difficulty, palpitations. Urine output is fair. Vitals - Stable afebrile, stable blood pressure. Labs - hyponatremia , elevated creatinine , hypokalemia . Normal lactic acid elevated. Low albumin.  Glucose elevated 134   Worsening leukocytosis. Nursing notes , Consults notes reviewed. Overnight events and updates discussed with Nursing staff . Background History:         Cherelle Acosta is 80 y.o. male  Who was admitted to the hospital on 9/8/2022 for treatment of Acute kidney injury superimposed on CKD ([facility] [address] Patient was brought to emergency room by ambulance after his wife called for patients change in mental status. Apparently patient overdosed on his sleep medication. He took medication within intent to fall asleep faster. Patient also reported that he has known history of arthritis and has been having difficulty with his joints. He was not moving much for last few days. He reports prior history of morbid obesity and weighted > 350 pounds. He underwent bariatric surgery > 25 years ago. He has prior history of diabetes mellitus however is not on any medication. Patient denies any other past medical history. On chart review it appears that patient has been admitted to [facility] with acute kidney injury and had elevated creatinine up to 3.5. Patient is very hard of hearing , he lives with his wife of > 35 Yrs. Patient denies any history of diarrhea, vomiting, nausea, fever. He denies any urinary difficulties, nocturia, retention. Initial evaluation emergency room showed hypotension with blood pressure 88/66 mmHg, blood glucose was significantly low 30's on first contact with EMS and repeat 60 on arrival to emergency room after dextrose. Lab evaluation showed hyponatremia 127, BUN 70, creatinine 4.34, bicarb 17, lactic acid 2.7, magnesium 1.3, troponin 58, albumin 1.8, AST 47, ALT 24, bilirubin 4.9. Alcohol levels negative. Urine tox was negative for cannabinoids, cocaine, methadone, fentanyl, oxycodone, amphetamine.   Patient was found to have significant leukocytosis 49.3 with low PLT 96 .  CT abdomen pelvis showed diffuse wall thickening in ascending, transverse colon suggestive of colitis, bladder wall thickening possible cystitis, suboptimal evaluation of gallbladder fossa. Patient has large right upper quadrant open surgical scar in place. Unsure about gallbladder status. He also has large midline surgical scar which she reports that is from bariatric surgery. PMH:  Past Medical History:   Diagnosis Date    Chronic back pain     Diabetes mellitus ([address]       Allergies: Allergies   Allergen Reactions    Cefazolin Rash     maculopapular rash over his face, chest, torso, and back. Medications :  cefTRIAXone (ROCEPHIN) IV, 1,000 mg, IntraVENous, Q24H  midodrine, 10 mg, Oral, TID WC  potassium bicarb-citric acid, 20 mEq, Oral, 4x Daily  pantoprazole (PROTONIX) 40 mg injection, 40 mg, IntraVENous, Daily  sodium chloride flush, 5-40 mL, IntraVENous, 2 times per day      Review of Systems   Review of Systems   Constitutional:  Negative for activity change, appetite change, fatigue, fever and unexpected weight change. HENT:  Negative for congestion, nosebleeds, rhinorrhea, sinus pressure, sneezing and voice change. Respiratory:  Negative for cough, choking, chest tightness, shortness of breath and wheezing. Cardiovascular:  Negative for chest pain, palpitations and leg swelling. Gastrointestinal:  Negative for abdominal pain, constipation, diarrhea, nausea and vomiting. Genitourinary:  Negative for difficulty urinating, dysuria, frequency, penile discharge and testicular pain. Musculoskeletal:  Positive for back pain. Skin:  Negative for rash. Neurological:  Negative for dizziness, weakness, light-headedness and headaches. Hematological:  Does not bruise/bleed easily. Psychiatric/Behavioral:  Negative for agitation, behavioral problems, confusion, self-injury, sleep disturbance and suicidal ideas.     Objective :      Current Vitals : Temp: 97.1 °F (36.2 °C),  Heart Rate: 89, Resp: 21, BP: 126/79, SpO2: 98 %  Last 24 Hrs Vitals   Patient Vitals for the past 24 hrs:   BP Temp Temp src Pulse Resp SpO2   09/10/22 0600 126/79 -- -- 89 -- 98 %   09/10/22 0500 138/79 -- -- 86 -- 97 %   09/10/22 0400 116/71 -- -- 95 -- 99 %   09/10/22 0300 (!) 113/91 -- -- 82 -- 98 %   09/10/22 0200 (!) 87/41 -- -- 88 21 98 %   09/10/22 0145 97/65 -- -- 81 18 97 %   09/10/22 0130 -- -- -- 75 -- 97 %   09/10/22 0115 -- -- -- 74 -- 98 %   09/10/22 0100 117/70 -- -- 72 -- 99 %   09/10/22 0045 112/63 -- -- 68 -- 98 %   09/10/22 0030 (!) 114/59 -- -- 71 -- 98 %   09/10/22 0016 -- 97.1 °F (36.2 °C) Temporal -- -- --   09/10/22 0015 96/64 -- -- 75 21 93 %   09/10/22 0000 (!) 107/58 -- -- 67 13 98 %   09/09/22 2345 (!) 130/50 -- -- 65 12 99 %   09/09/22 2330 107/71 -- -- 81 12 99 %   09/09/22 2315 121/73 -- -- 78 13 100 %   09/09/22 2300 119/69 -- -- 76 15 99 %   09/09/22 2245 88/72 -- -- 81 22 100 %   09/09/22 2230 107/75 -- -- 78 17 96 %   09/09/22 2215 99/69 -- -- 76 12 97 %   09/09/22 2200 110/62 -- -- 76 16 95 %   09/09/22 2145 96/61 -- -- 93 14 95 %   09/09/22 2130 114/79 -- -- 84 15 96 %   09/09/22 2115 124/68 -- -- 78 16 93 %   09/09/22 2100 122/71 -- -- 76 19 94 %   09/09/22 2045 106/77 -- -- 81 19 (!) 89 %   09/09/22 2030 (!) 56/45 -- -- 89 16 94 %   09/09/22 2015 (!) 74/52 -- -- 95 (!) 32 (!) 73 %   09/09/22 2000 121/68 97 °F (36.1 °C) Temporal 81 14 99 %   09/09/22 1945 122/69 -- -- 80 15 97 %   09/09/22 1930 115/79 -- -- 76 17 99 %   09/09/22 1915 117/65 -- -- 73 17 98 %   09/09/22 1900 112/65 -- -- 79 16 99 %   09/09/22 1830 103/67 -- -- 80 19 97 %   09/09/22 1815 (!) 96/54 -- -- 66 15 97 %   09/09/22 1800 105/67 -- -- 80 24 97 %   09/09/22 1745 (!) 100/53 -- -- 80 17 98 %   09/09/22 1730 (!) 87/43 -- -- 70 16 98 %   09/09/22 1700 (!) 127/50 -- -- 85 -- --   09/09/22 1630 (!) 103/55 -- -- 77 -- --   09/09/22 1615 (!) 91/56 -- -- 77 -- 100 %   09/09/22 1600 (!) 95/47 97.4 °F (36.3 °C) Temporal 73 14 97 %   09/09/22 1545 (!) 81/52 -- -- 74 -- 96 %   09/09/22 1530 (!) 122/52 -- -- 83 12 98 %   09/09/22 1515 (!) 88/54 -- -- 80 11 99 %   09/09/22 1500 (!) 85/46 -- -- 75 13 --   09/09/22 1445 93/68 -- -- 78 -- --   09/09/22 1400 96/65 -- -- 86 15 --   09/09/22 1345 112/62 -- -- 93 13 97 %   09/09/22 1330 112/63 -- -- (!) 101 13 96 %   09/09/22 1315 97/65 -- -- 99 14 96 %   09/09/22 1300 91/63 -- -- 95 15 96 %   09/09/22 1245 100/62 -- -- 91 15 --   09/09/22 1230 (!) 102/59 -- -- 82 19 --   09/09/22 1215 98/62 -- -- 88 16 --   09/09/22 1200 (!) 99/56 97.4 °F (36.3 °C) Temporal 82 14 95 %   09/09/22 1145 (!) 83/54 -- -- 85 17 98 %   09/09/22 1130 99/61 -- -- 84 20 95 %   09/09/22 1120 (!) 95/54 -- -- 81 20 97 %   09/09/22 1115 107/61 -- -- 81 18 98 %   09/09/22 1110 (!) 106/58 -- -- 81 15 97 %   09/09/22 1105 (!) 108/58 -- -- 78 13 98 %   09/09/22 1100 (!) 84/53 -- -- 79 20 97 %   09/09/22 1055 (!) 78/46 -- -- 88 17 --   09/09/22 1010 (!) 100/45 -- -- 76 15 98 %   09/09/22 1005 (!) 98/53 -- -- 76 15 98 %   09/09/22 1000 (!) 88/50 -- -- 75 15 97 %   09/09/22 0955 (!) 80/41 -- -- 81 24 91 %   09/09/22 0950 (!) 70/34 -- -- 89 19 92 %   09/09/22 0945 (!) 110/94 -- -- 85 19 93 %   09/09/22 0940 (!) 52/26 -- -- 85 19 90 %   09/09/22 0935 (!) 55/37 -- -- 90 29 --   09/09/22 0930 (!) 49/31 -- -- 90 19 --   09/09/22 0925 (!) 52/35 -- -- (!) 101 17 --   09/09/22 0900 (!) 96/55 -- -- 94 21 96 %   09/09/22 0800 (!) 87/52 -- -- 83 18 99 %   09/09/22 0730 (!) 83/53 -- -- 83 18 96 %       Intake / output   09/08 1901 - 09/10 0700  In: 2128.3 [I.V.:83.7]  Out: 1575 [Urine:1575]  Physical Exam:  Physical Exam  Vitals and nursing note reviewed. Constitutional:       General: He is not in acute distress. Appearance: He is not diaphoretic. HENT:      Head: Normocephalic and atraumatic. Nose:      Right Sinus: No maxillary sinus tenderness or frontal sinus tenderness. Left Sinus: No maxillary sinus tenderness or frontal sinus tenderness. Mouth/Throat:      Pharynx: No oropharyngeal exudate.    Eyes:      General: No scleral icterus. Conjunctiva/sclera: Conjunctivae normal.      Pupils: Pupils are equal, round, and reactive to light. Neck:      Thyroid: No thyromegaly. Vascular: No JVD. Cardiovascular:      Rate and Rhythm: Normal rate and regular rhythm. Pulses:           Dorsalis pedis pulses are 2+ on the right side and 2+ on the left side. Heart sounds: Normal heart sounds. No murmur heard. Pulmonary:      Effort: Pulmonary effort is normal.      Breath sounds: Normal breath sounds. No wheezing or rales. Abdominal:      Palpations: Abdomen is soft. There is no mass. Tenderness: There is no abdominal tenderness. Comments: Surgical Scar    Musculoskeletal:      Cervical back: Full passive range of motion without pain and neck supple. Lymphadenopathy:      Head:      Right side of head: No submandibular adenopathy. Left side of head: No submandibular adenopathy. Cervical: No cervical adenopathy. Skin:     General: Skin is warm. Neurological:      Mental Status: He is alert and oriented to person, place, and time. Motor: No tremor. Psychiatric:         Behavior: Behavior is cooperative. Laboratory findings:    Recent Labs     09/09/22  0635 09/09/22  1432 09/10/22  0343   WBC 47.9* 49.3* 54.3*   HGB 10.2* 10.6* 11.3*   HCT 29.6* 31.1* 32.1*   PLT 96* 111* 105*   INR 1.9  --  1.7       Recent Labs     09/08/22  1305 09/09/22  0035 09/09/22  0635 09/09/22  1144 09/09/22  1259 09/09/22  1300 09/10/22  0343   *   < > 128*  --  127* 126* 128*   K 3.4*   < > 3.4*  --   --  3.6* 4.3   CL 95*   < > 99  --   --  93* 91*   CO2 17*   < > 18*  --   --  20 26   GLUCOSE 62*   < > 133*  --   --  147* 97   BUN 70*   < > 65*  --   --  63* 61*   CREATININE 4.34*   < > 3.91*  --   --  3.71* 3.82*   MG 1.3*  --  2.0  --   --   --  1.8   CALCIUM 7.6*   < > 7.3*  --   --  7.5* 7.8*   PHOS  --   --   --  3.9  --   --   --     < > = values in this interval not displayed.        Recent Labs 09/08/22  1305 09/08/22  1751 09/09/22  0635 09/09/22  1432 09/10/22  0343   PROT 5.6*  --  5.1*  --  5.7*   LABALBU 1.8*  --  1.6*  --  1.8*   TSH 2.98  --   --   --   --    AST 47*  --  39  --  43*   ALT 24  --  20  --  23   ALKPHOS 188*  --  153*  --  171*   BILITOT 4.9*  --  3.9*  --  4.0*   AMMONIA 60  --   --  31  --    CKTOTAL  --  152  --   --   --             Specific Gravity, UA   Date Value Ref Range Status   09/08/2022 1.015 1.005 - 1.030 Final     Protein, UA   Date Value Ref Range Status   09/08/2022 1+ (A) NEGATIVE Final     RBC, UA   Date Value Ref Range Status   09/08/2022 20 TO 50 0 - 2 /HPF Final     Bacteria, UA   Date Value Ref Range Status   09/08/2022 MANY (A) None Final     Nitrite, Urine   Date Value Ref Range Status   09/08/2022 NEGATIVE NEGATIVE Final     WBC, UA   Date Value Ref Range Status   09/08/2022 5 TO 10 0 - 5 /HPF Final     Leukocyte Esterase, Urine   Date Value Ref Range Status   09/08/2022 SMALL (A) NEGATIVE Final       Imaging / Clinical Data :-   CT HEAD WO CONTRAST    Result Date: 9/8/2022  No acute intracranial abnormality. Prominent CSF spaces over the frontal and parietal lobes. No evidence of hydrocephalus. US GALLBLADDER RUQ    Result Date: 9/9/2022  1. Technically limited exam.  The gallbladder is not clearly identified. Based on recent CT exam, the gallbladder was relatively contracted with intermediate density fluid and stones. 2.  No evidence for biliary dilatation. 3.  Morphologic findings suggestive of cirrhosis with small perihepatic ascites and probable cyst.     XR CHEST PORTABLE    Result Date: 9/9/2022  1. Right internal jugular line terminates at the superior caval atrial junction. 2.  Shallow inflation with more prominent perihilar opacities. Similar appearance of peribronchial wall thickening. While this in part may represent atelectasis, developing edema should be considered.      XR CHEST 1 VIEW    Result Date: 9/8/2022  Suboptimal evaluation secondary to patient positioning/rotation. No radiographic evidence of an acute cardiopulmonary process. US RETROPERITONEAL COMPLETE    Result Date: 9/8/2022  Unremarkable ultrasound of the kidneys and urinary bladder. CT CHEST ABDOMEN PELVIS WO CONTRAST    Result Date: 9/8/2022  1. Diffuse wall thickening of the ascending and transverse colon may represent colitis. 2. Questionable rectal wall thickening may represent proctitis. 3. Suspected cholelithiasis. Suboptimal evaluation of gallbladder fossa. Can correlate with right upper quadrant ultrasound. 4. Mild bladder wall thickening may be related to underdistension versus possible cystitis. Can correlate with urinalysis. 5. Small amount of free fluid present around the liver and spleen. 6. Mild bilateral lower lobe patchy opacities are favored to represent atelectasis. However, an early inflammatory/infectious process is not excluded in the proper clinical setting. 7. Diffuse subcutaneous fat stranding may related to fluid overload. 8. Hypodense blood pool, may be related to the anemia. Clinical Course : stable  Assessment and Plan  :        Acute renal failure likely has underlying chronic kidney disease stage III. Unsure of his  baseline likely 1.5-2.0. IV fluids with bicarb infusion, maintain Medeiros catheter. Kidney ultrasound does not show any evidence of medical renal disease. Likely has ischemic ATN from hypotension and dehydration. follow Culture sensitivity. Nephrology following , continue urinary catheter. Gram negative Sepsis likely secondary to colitis-IV antibiotics, change to Rocephin 2 Gm daily . Follow culture sensitivity. Leukocytosis worsening. Consult ID. Underlying type 2 diabetes mellitus with hypoglycemia - Not on medications. Acute Cystitis - On antibiotics   Drug overdose-accidental -   Severe hypoglycemia -dextrose infusion, monitor blood sugars every 2 Hrs for now .    Moderate malnutrition -       Continue to monitor vitals , Intake / output ,  Cell count , HGB , Kidney function, oxygenation  as indicated . Plan and updates discussed with patient ,  answers  explained to satisfaction.    Plan discussed with Staff  RN     (Please note that portions of this note were completed with a voice recognition program. Efforts were made to edit the dictations but occasionally words are mis-transcribed.)      Marya Garcia MD  9/10/2022

## 2022-09-10 NOTE — PROGRESS NOTES
End Of Shift Note  [address] ICU  Summary of shift: Patient had an uneventful night, except for 1 occurrence of unresponsiveness after having a BM for 5 min at beginning of shift. Patient returned back to responding to questions, following commands, and telling jokes. VSS stable throughout the night, Vasopressin and medications administered as ordered. Patient had small mucus BM. No complaints of pain or discomfort throughout the night. Educated the patient that it is not safe for him to get out of bed. Patient stated he slept good. Vitals:    Vitals:    09/10/22 0400 09/10/22 0500 09/10/22 0600 09/10/22 0722   BP: 116/71 138/79 126/79    Pulse: 95 86 89    Resp:       Temp:    97.3 °F (36.3 °C)   TempSrc:    Temporal   SpO2: 99% 97% 98%    Weight:       Height:            I&O:   Intake/Output Summary (Last 24 hours) at 9/10/2022 0725  Last data filed at 9/10/2022 0648  Gross per 24 hour   Intake 1874.45 ml   Output 1075 ml   Net 799.45 ml       Resp Status: VSS.  Room Air SpO2 >90%    Ventilator Settings:     / / /     Critical Care IV infusions:   norepinephrine 5 mcg/min (09/10/22 7111)    IV infusion builder 100 mL/hr at 09/09/22 2218    dextrose      sodium chloride Stopped (09/09/22 1146)        LDA:   CVC Triple Lumen 09/09/22 Right Internal jugular (Active)   Number of days: 0       Peripheral IV 09/09/22 Distal;Left Forearm (Active)   Number of days: 0       Urinary Catheter 09/08/22 Coude (Active)   Number of days: 1

## 2022-09-10 NOTE — PROGRESS NOTES
Physical Therapy  DATE: 9/10/2022    NAME: Oralia Leslie  MRN: [id number]   : 1935    Patient not seen this date for Physical Therapy due to:      [x] Cancel by RN or physician due to: patient had syncopal episode overnight, patient not medically appropriate, continue to pursue    [] Hemodialysis    [] Critical Lab Value Level     [] Blood transfusion in progress    [] Acute or unstable cardiovascular status   _MAP < 55 or more than >115  _HR < 40 or > 130    [] Acute or unstable pulmonary status   -FiO2 > 60%   _RR < 5 or >40    _O2 sats < 85%    [] Strict Bedrest    [] Off Unit for surgery or procedure    [] Off Unit for testing       [] Pending imaging to R/O fracture    [] Refusal by Patient      [] Other      [] PT being discontinued at this time. Patient independent. No further needs. [] PT being discontinued at this time as the patient has been transferred to hospice care. No further needs.       Capo Herring, PT

## 2022-09-10 NOTE — PROGRESS NOTES
Nephrology Progress Note    Patient:  Rob Portillo; 80 y.o. MRN# [id number]  Location:    Attending:  Te Calderon MD  Admit Date:  2022   Hospital Day: 2    Subjective   Patient  admitted on 2022 for AMS/lethargy found to be hypoglycemic, hypotensive with E. coli bacteremia.   We have been consulted for HINA with hyponatremia    Patient seen and evaluated in room IV fluids infusing was initially running water with bicarb @100 ml/hr  No acute events overnight  Levophed infusing at 5 mcg/min  Antibiotics continue for E. coli sepsis  UOP documented at 1075 mL over the last 24 hours with a slightly positive fluid balance of 550 mL since admission, off diuretic therapy  CRT essentially unchanged over the last 24 hours CRT 3.8 to was 3.91 yeserday      Recent Labs     22  0635 22  1259 22  1300 09/10/22  0343   * 127* 126* 128*   K 3.4*  --  3.6* 4.3   CL 99  --  93* 91*   CO2 18*  --  20 26   BUN 65*  --  63* 61*   CREATININE 3.91*  --  3.71* 3.82*   GLUCOSE 133*  --  147* 97   CALCIUM 7.3*  --  7.5* 7.8*       Objective   VS: /79   Pulse 89   Temp 97.1 °F (36.2 °C) (Temporal)   Resp 21   Ht 5' 9\" (1.753 m)   Wt 171 lb (77.6 kg)   SpO2 98%   BMI 25.25 kg/m²   MAXIMUM TEMPERATURE OVER 24 HRS:  Temp (24hrs), Av.2 °F (36.2 °C), Min:97 °F (36.1 °C), Max:97.4 °F (36.3 °C)    24 HR BLOOD PRESSURE RANGE:  Systolic (78IHC), RADHA:97 , Min:49 , ELB:824   ; Diastolic (24ECQ), YIH:85, Min:26, Max:94    24 HR INTAKE/OUTPUT:    Intake/Output Summary (Last 24 hours) at 9/10/2022 0653  Last data filed at 9/10/2022 0648  Gross per 24 hour   Intake 1874.45 ml   Output 1075 ml   Net 799.45 ml     WEIGHT: Patient Vitals for the past 96 hrs (Last 3 readings):   Weight   22 1830 171 lb (77.6 kg)   22 1243 200 lb (90.7 kg)       Current Medications    Scheduled Meds:    cefTRIAXone (ROCEPHIN) IV  1,000 mg IntraVENous Q24H    midodrine  10 mg Oral TID WC    potassium Urinalysis/Chemistries      Lab Results   Component Value Date/Time    NITRU NEGATIVE 09/08/2022 03:33 PM    COLORU Dark Yellow 09/08/2022 03:33 PM    PHUR 5.5 09/08/2022 03:33 PM    WBCUA 5 TO 10 09/08/2022 03:33 PM    RBCUA 20 TO 50 09/08/2022 03:33 PM    MUCUS NOT REPORTED 07/31/2019 07:58 AM    TRICHOMONAS NOT REPORTED 07/31/2019 07:58 AM    YEAST NOT REPORTED 07/31/2019 07:58 AM    BACTERIA MANY 09/08/2022 03:33 PM    SPECGRAV 1.015 09/08/2022 03:33 PM    LEUKOCYTESUR SMALL 09/08/2022 03:33 PM    UROBILINOGEN Normal 09/08/2022 03:33 PM    BILIRUBINUR  09/08/2022 03:33 PM     Presumptive positive. Unable to confirm due to unavailability of reagent. GLUCOSEU NEGATIVE 09/08/2022 03:33 PM    KETUA TRACE 09/08/2022 03:33 PM    AMORPHOUS NOT REPORTED 07/31/2019 07:58 AM       Radiology    CT HEAD WO CONTRAST    Result Date: 9/8/2022  No acute intracranial abnormality. Prominent CSF spaces over the frontal and parietal lobes. No evidence of hydrocephalus. US GALLBLADDER RUQ    Result Date: 9/9/2022  1. Technically limited exam.  The gallbladder is not clearly identified. Based on recent CT exam, the gallbladder was relatively contracted with intermediate density fluid and stones. 2.  No evidence for biliary dilatation. 3.  Morphologic findings suggestive of cirrhosis with small perihepatic ascites and probable cyst.     XR CHEST PORTABLE    Result Date: 9/9/2022  1. Right internal jugular line terminates at the superior caval atrial junction. 2.  Shallow inflation with more prominent perihilar opacities. Similar appearance of peribronchial wall thickening. While this in part may represent atelectasis, developing edema should be considered. XR CHEST 1 VIEW    Result Date: 9/8/2022  Suboptimal evaluation secondary to patient positioning/rotation. No radiographic evidence of an acute cardiopulmonary process.      US RETROPERITONEAL COMPLETE    Result Date: 9/8/2022  Unremarkable ultrasound of the kidneys and urinary bladder. CT CHEST ABDOMEN PELVIS WO CONTRAST    Result Date: 9/8/2022  1. Diffuse wall thickening of the ascending and transverse colon may represent colitis. 2. Questionable rectal wall thickening may represent proctitis. 3. Suspected cholelithiasis. Suboptimal evaluation of gallbladder fossa. Can correlate with right upper quadrant ultrasound. 4. Mild bladder wall thickening may be related to underdistension versus possible cystitis. Can correlate with urinalysis. 5. Small amount of free fluid present around the liver and spleen. 6. Mild bilateral lower lobe patchy opacities are favored to represent atelectasis. However, an early inflammatory/infectious process is not excluded in the proper clinical setting. 7. Diffuse subcutaneous fat stranding may related to fluid overload. 8. Hypodense blood pool, may be related to the anemia. 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 US showing right kidney 8.2 cm, left 10 cm. previous work-up for immune complex, pauci-immune, anti-GBM and paraprotein disease negative.   Anion gap and nongap metabolic acidosis   Hypovolemic hyponatremia resolving sodium up to 128 had dropped down to 26  E. coli bacteremia with 2 out of 2 blood cultures positive for E. coli in urine culture positive  Septic shock from E. coli bacteremia requiring pressor support  Colitis initiated on CT  Obstructive jaundice with hyperbilirubinemia elevated ALP and cholelithiasis on imaging  DMT2, diet controlled  Leukemoid reaction, with neutrophilic leukocytosis  Normocytic normochromic anemia  Thrombocytopenia   History of bariatric surgery    Plan   Change IV fluids to normal saline at 100 mL an hour  BMP at 1300 today  Agree with pressor support wean as appropriate, continue midodrine  Antibiotics at renal dosing  Continue Medeiros catheter for an additional 24 hours for I/O  We will continue to follow    Nutrition   Renal Diet/TF      Thank you. Please call with any questions. DIONICIO Teran - FELICIANO     [facility]. Patient seen with nurse practitioner. Known history of type 2 diabetes which is now diet controlled, hypertension, chronic back pain and chronic kidney disease stage IIIb-4 baseline 1.8-2.0 follows up with Dr. Joan Pelaez. Comes into the hospital brought in by his family members with lethargy and unresponsiveness. Initial suspicion was that he had overdosed on sleeping pills. Was found to be hypotensive in the ER hypoglycemic. Also found to be in acute kidney injury. Creatinine was 4.3. Also had anion gap and nongap metabolic acidosis from lactic acidosis. Work-up since then shows that he has E. coli bacteremia, E. coli UTI, also has obstructive jaundice. Renal function appears to have plateaued with a creatinine around 3.8-4. Urine output has improved. He still is requiring pressor support although being weaned down. Acidosis resolved, bicarbonate is now 26 IV fluids been switched to normal saline. Medeiros catheter in place with draining well. Clinically appears to be doing well, awake follows commands, answers questions appropriately. Oral intake being advanced. Jaundice evident  No edema, heart sounds normal lungs clear  Impression  1. Acute kidney injury secondary to ischemic ATN from shock, low flow, decreased intake Baseline creatinine 1.8-2.0 peaked up to 4.3 plateaued nonoliguric expected to improve  2. Anion gap and nongap metabolic acidosis from lactic acidosis resolved, salicylate level and Tylenol levels normal  3. 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 Joan Pelaez.   Ultrasound shows right kidney to be 8.2, left to be 10 cm indicating significant renal asymmetry and nephrosclerosis  4.  E. coli bacteremia on Rocephin  5.  E. coli UTI  6. Septic shock pressors being weaned  7. Type 2 diabetes which is diet-controlled  8. Obstructive jaundice  Plan  1. Continue normal saline at 100 mill an hour  2. Leave Medeiros in for another day  3. Continue ProAmatine  4. No need for diuretics  5. Advance diet  6. Expect renal function to improve  7. We will follow  Attending Physician Statement  I have discussed the care of Sherron Gallegos, including pertinent history and exam findings with the resident/fellow. I have reviewed the key elements of all parts of the encounter with the resident/fellow. I have seen and examined the patient with the resident/fellow. I agree with the assessment and plan and status of the problem list as documented.       .  Electronically signed by Anyi Johnston MD on 9/10/2022 at 11:37 AM

## 2022-09-10 NOTE — PLAN OF CARE
[facility]  Office: [address], DO, Katelynn Darby, DO, Elijah Frausto, DO, Trisha Marques Blood, DO, Lenin Astudillo MD, Anu Otto MD, Denisa Steel MD, Jeremy Hagan MD,  Khai Carolina MD, Rhonda Phillip MD, Nevaeh Stevens, DO, Sathya Damon MD,  Ricardo Garland MD, Veronika Chung MD, Bryce Burnett DO, Leeann Phan MD, Derek Valenzuela MD, Linda Rutherford MD, Collette Hahn, MD, Nicky Whyte MD, Breana Santos MD, Diya Perales DO, Gamaliel Meeks MD, Shaunna Crespo MD, Katelynn Johnson, CNP,  Neal Singh, CNP, Jose Tello, CNP, Yan Boyle, CNP, Baljinder Vallejo PA-C, Jenni June DNP, Brian Mortensen, CNP, Gabino Rob, CNP, Misty Lovell, CNP, Haydee Cadena, CNP, Abbey Canales, CNP, Dorothy Pickard, CNS, Ross Shanks, DNP, Dolores Olson, CNP, Dorothy Montaño, CNP, Renee Esquivel, [id number] [address]   Nighttime In-House Provider      9/9/2022    9:09 PM    Name:   Tia Lim  MRN:     [id number]     Acct:      [de-identified]   Room:   [facility]1110-01   Day:  1  Admit Date:  9/8/2022 12:37 PM    PCP:   No primary care provider on file. Code Status:  Full Code    Called to the floor by staff to evaluate Tia Lim in 1110/1110-01 at 9:09 PM with complaint of possible vagal response after using the [facility]    Assessment/Plan: Patient is alert and oriented x3 but is not quite sure why he is in the hospital.  He was telling jokes during my assessment. He states he feels lightheaded every now and then but he denies chest pain, nausea, shortness of breath or abdominal pain. Most recent vitals are stable. Additional medical information reviewed: Labs, scans, notes, and medications    I spent approximately 30 minutes reviewing the patient's chart, completing his assessment and discussing his plan of care with nursing staff    Physical Examination:        Physical Exam  Vitals and nursing note reviewed.    HENT:      Mouth/Throat: ([facility]    Vitals:  BP (!) 74/52   Pulse 95   Temp 97.4 °F (36.3 °C) (Temporal)   Resp (!) 32   Ht 5' 9\" (1.753 m)   Wt 171 lb (77.6 kg)   SpO2 (!) 73%   BMI 25.25 kg/m²   Temp (24hrs), Av.2 °F (36.2 °C), Min:97.1 °F (36.2 °C), Max:97.4 °F (36.3 °C)    Recent Labs     22  0954 22  1150 22  1348 22  1614   POCGLU 146* 134* 155* 109       I/O (24Hr):     Intake/Output Summary (Last 24 hours) at 2022  Last data filed at 2022 2100  Gross per 24 hour   Intake 2091.33 ml   Output 1050 ml   Net 1041.33 ml       Labs:    Hematology:  Recent Labs     22  1305 22  0635   WBC 49.3* 47.9*   RBC 3.29* 3.33*   HGB 10.2* 10.2*   HCT 29.6* 29.6*   MCV 90.0 88.9   MCH 31.0 30.6   MCHC 34.5 34.5   RDW 16.4* 16.5*   PLT 96* 96*   MPV 10.3 10.7   SEGS 94* 91*   LYMPHOPCT 1* 2*   MONOPCT 3 3   EOSRELPCT 0* 0*   BASOPCT 0 0   PROTIME  --  21.7*   INR  --  1.9     Chemistry:  Recent Labs     22  1305 22  1419 22  1751 22  0035 22  0250 22  0635 22  1144 22  1259 22  1300 22  1432   *  --   --    < > 123* 128*  --  127* 126*  --    K 3.4*  --   --    < > 3.5* 3.4*  --   --  3.6*  --    CL 95*  --   --    < > 92* 99  --   --  93*  --    CO2 17*  --   --    < > 16* 18*  --   --  20  --    GLUCOSE 62*  --   --    < > 142* 133*  --   --  147*  --    BUN 70*  --   --    < > 65* 65*  --   --  63*  --    CREATININE 4.34*  --   --    < > 4.21* 3.91*  --   --  3.71*  --    MG 1.3*  --   --   --   --  2.0  --   --   --   --    ANIONGAP 15  --   --    < > 15 11  --   --  13  --    LABGLOM 13*  --   --    < > 13* 15*  --   --  16*  --    GFRAA 16*  --   --    < > 16* 18*  --   --  19*  --    CALCIUM 7.6*  --   --    < > 7.4* 7.3*  --   --  7.5*  --    PHOS  --   --   --   --   --   --  3.9  --   --   --    TROPHS 60* 58*  --   --   --   --   --   --   --  48*   CKTOTAL  --   --  152  --   --   --   --   --   --   --     < > = values in this interval not displayed. Recent Labs     09/08/22  1305 09/09/22  0635 09/09/22  1432   PROT 5.6* 5.1*  --    LABALBU 1.8* 1.6*  --    TSH 2.98  --   --    AST 47* 39  --    ALT 24 20  --    ALKPHOS 188* 153*  --    BILITOT 4.9* 3.9*  --    AMMONIA 60  --  31     Glucose:  Recent Labs     09/09/22  0641 09/09/22  0804 09/09/22  0954 09/09/22  1150 09/09/22  1348 09/09/22  1614   POCGLU 131* 132* 146* 134* 155* 109         Lab Results   Component Value Date/Time    SPECIAL RFA 12ML 09/08/2022 02:19 PM     Lab Results   Component Value Date/Time    CULTURE ESCHERICHIA COLI >386005 CFU/ML (A) 09/08/2022 05:44 PM       No results found for: POCPH, PHART, PH, POCPCO2, FGG7RJV, PCO2, POCPO2, PO2ART, PO2, POCHCO3, GNA7ZWG, HCO3, NBEA, PBEA, BEART, BE, THGBART, THB, ICP3DDE, SKMP0TNO, Q3FMLRUY, O2SAT, FIO2    Radiology:    CT HEAD WO CONTRAST    Result Date: 9/8/2022  No acute intracranial abnormality. Prominent CSF spaces over the frontal and parietal lobes. No evidence of hydrocephalus. US GALLBLADDER RUQ    Result Date: 9/9/2022  1. Technically limited exam.  The gallbladder is not clearly identified. Based on recent CT exam, the gallbladder was relatively contracted with intermediate density fluid and stones. 2.  No evidence for biliary dilatation. 3.  Morphologic findings suggestive of cirrhosis with small perihepatic ascites and probable cyst.     XR CHEST PORTABLE    Result Date: 9/9/2022  1. Right internal jugular line terminates at the superior caval atrial junction. 2.  Shallow inflation with more prominent perihilar opacities. Similar appearance of peribronchial wall thickening. While this in part may represent atelectasis, developing edema should be considered. XR CHEST 1 VIEW    Result Date: 9/8/2022  Suboptimal evaluation secondary to patient positioning/rotation. No radiographic evidence of an acute cardiopulmonary process.      US RETROPERITONEAL COMPLETE    Result Date: 9/8/2022  Unremarkable ultrasound of the kidneys and urinary bladder. CT CHEST ABDOMEN PELVIS WO CONTRAST    Result Date: 9/8/2022  1. Diffuse wall thickening of the ascending and transverse colon may represent colitis. 2. Questionable rectal wall thickening may represent proctitis. 3. Suspected cholelithiasis. Suboptimal evaluation of gallbladder fossa. Can correlate with right upper quadrant ultrasound. 4. Mild bladder wall thickening may be related to underdistension versus possible cystitis. Can correlate with urinalysis. 5. Small amount of free fluid present around the liver and spleen. 6. Mild bilateral lower lobe patchy opacities are favored to represent atelectasis. However, an early inflammatory/infectious process is not excluded in the proper clinical setting. 7. Diffuse subcutaneous fat stranding may related to fluid overload. 8. Hypodense blood pool, may be related to the anemia.        Bridget Benito, DIONICIO - CNP  9/9/2022  9:09 PM

## 2022-09-10 NOTE — PROGRESS NOTES
[facility] NOTE    Room # 1110/1110-01   Name: Viola Forrester              Reason for visit: Routine    I visited the patient. Admit Date & Time: 9/8/2022 12:37 PM    Assessment:  Viola Forrester is a 80 y.o. male. Upon entering the room patient states about their medical condition, states struggles with their medical situation. States frustrations. Patient states well , treated well. PT\" had request for a phone,  helps PT: to get a phone. Intervention:   provided a ministry presence, listening. Outcome:  Patient open to visit. Plan:  Chaplains will remain available to offer spiritual and emotional support as needed. Electronically signed by Leonela Sutherland.  Chaplain Jules, on 9/10/2022 at 1:05 PM.  Fitz        09/10/22 1301   Encounter Summary   Service Provided For: Patient   Referral/Consult From: Rounding   Support System Unknown   Complexity of Encounter Low   Begin Time 0945   End Time  0950   Total Time Calculated 5 min   Encounter    Type Initial Screen/Assessment   Assessment/Intervention/Outcome   Assessment Anxious   Intervention Discussed belief system/Judaism practices/neo;Sustaining Presence/Ministry of presence   Outcome Did not respond

## 2022-09-10 NOTE — PROGRESS NOTES
[facility]    Gastroenterology Daily Progress Note      Patient:   Joon Kirby   :    1935   Facility:   [facility]  Date:     9/10/2022  Consultant:   DIONICIO Lopez CNP, CNP      SUBJECTIVE  80 y.o. male admitted 2022 with Hypocalcemia [E83.51]  Hypokalemia [E87.6]  Lactic acidosis [E87.2]  Hypomagnesemia [E83.42]  Colitis [K52.9]  Hyponatremia [E87.1]  Proctitis [K62.89]  Hypoglycemia [E16.2]  HINA (acute kidney injury) ([facility] [address] [N17.9]  Calculus of gallbladder without cholecystitis without obstruction [K80.20]  Acute cystitis with hematuria [N30.01]  Altered mental status, unspecified altered mental status type [R41.82]  Acute kidney injury superimposed on CKD ([facility] [address] [N17.9, N18.9] and seen for abdominal pain, elevated lft's colitis per imaging. The pt was seen and examined. Pt currently alert, no c/o abdominal pain or nausea. Lft's elevated. Pt has continued leucocytosis. Scant non bloody BM per RN.  .        OBJECTIVE  Scheduled Meds:   cefTRIAXone (ROCEPHIN) IV  1,000 mg IntraVENous Q24H    midodrine  10 mg Oral TID WC    potassium bicarb-citric acid  20 mEq Oral 4x Daily    pantoprazole (PROTONIX) 40 mg injection  40 mg IntraVENous Daily    sodium chloride flush  5-40 mL IntraVENous 2 times per day       Vital Signs:  /67   Pulse 92   Temp 97.3 °F (36.3 °C) (Temporal)   Resp 22   Ht 5' 9\" (1.753 m)   Wt 171 lb (77.6 kg)   SpO2 96%   BMI 25.25 kg/m²      Physical Exam:     General Appearance: alert and oriented to person, place and time, well-developed and well-nourished, in no acute distress  Skin: warm and dry, no rash or erythema  Head: normocephalic and atraumatic  Eyes: pupils equal, round, and reactive to light, extraocular eye movements intact, conjunctivae icteric  ENT: hearing grossly normal bilaterally  Neck: neck supple and non tender without mass, no thyromegaly or thyroid nodules, no cervical lymphadenopathy   Pulmonary/Chest: clear to is not   excluded in the proper clinical setting. 7. Diffuse subcutaneous fat stranding may related to fluid overload. 8. Hypodense blood pool, may be related to the anemia. ASSESSMENT/plan  Abdominal pain with colitis per imaging, ecoli sepsis, UTI elevated lft's., hypotension   -d/w md will obtain stat mrcp abd to r/o cholangitis, case was d/w radiology by attending md  -abx per ID  -trend lft  Pain mgt per primary md      2. AMS improved s/p accidental overdose on melatonin    Time spent reviewing chart assessing pt and d/w attending md around 30 minutes      This plan was formulated in collaboration with Dr. Kayleigh Eisenberg .     Electronically signed by: DIONICIO Abbott CNP on 9/10/2022 at 9:09 AM

## 2022-09-10 NOTE — PROGRESS NOTES
Occupational Therapy    DATE: 9/10/2022    NAME: Asmita Morales  MRN: [id number]   : 1935    Patient not seen this date for Occupational Therapy due to:      [x] Cancel by RN: Per RN Jensen, pt not medically appropriate for OT eval this date d/t low BP. Will continue to follow.      [] Hemodialysis    [] Critical Lab Value Level     [] Blood transfusion in progress    [] Acute or unstable cardiovascular status   _MAP < 55 or more than >115  _HR < 40 or > 130    [] Acute or unstable pulmonary status   -FiO2 > 60%   _RR < 5 or >40    _O2 sats < 85%    [] Strict Bedrest    [] Off Unit for surgery or procedure    [] Off Unit for testing       [] Pending imaging to R/O fracture    [] Refusal by Patient      [] Other      Kaycee Elaine OTR/L

## 2022-09-10 NOTE — PROGRESS NOTES
Pulmonary Critical Care Progress Note    Patient seen for the follow up of Acute kidney injury superimposed on CKD ([address]     Subjective:    He was weaned off Levophed. He still NPO. He is on room air. He has adequate urine output on normal saline off bicarb drip. He denies pain. He had another episode of near syncope after trying to get to the bedside commode yesterday per RN. Examination:    Vitals: BP (!) 112/59   Pulse 74   Temp 97.1 °F (36.2 °C) (Temporal)   Resp 12   Ht 5' 9\" (1.753 m)   Wt 171 lb (77.6 kg)   SpO2 97%   BMI 25.25 kg/m²   SpO2  Av.3 %  Min: 73 %  Max: 100 %  General appearance: alert and cooperative with exam  Neck: No JVD  Lungs: decreased breath sounds no crackles  Heart: regular rate and rhythm, S1, S2 normal, no gallop  Abdomen: Soft, non tender, + BS  Extremities: no cyanosis or clubbing. No significant edema    LABs:    CBC:   Recent Labs     22  1432 09/10/22  0343   WBC 49.3* 54.3*   HGB 10.6* 11.3*   HCT 31.1* 32.1*   * 105*     BMP:   Recent Labs     09/10/22  0343 09/10/22  1325   * 126*   K 4.3 4.4   CO2 26 26   BUN 61* 56*   CREATININE 3.82* 3.38*   LABGLOM 15* 17*   GLUCOSE 97 89     PT/INR:   Recent Labs     22  0635 09/10/22  0343   PROTIME 21.7* 20.3*   INR 1.9 1.7       LIVER PROFILE:  Recent Labs     22  0635 09/10/22  0343   AST 39 43*   ALT 20 23   LABALBU 1.6* 1.8*       Radiology:    CXR   1. Right internal jugular line terminates at the superior caval atrial   junction. 2.  Shallow inflation with more prominent perihilar opacities. Similar   appearance of peribronchial wall thickening. While this in part may   represent atelectasis, developing edema should be considered.            Impression:  Sepsis/septic shock /cholangitis   Acute renal failure /metabolic acidosis   hyponatremia   Change in mental status   Levophed subcutaneous infiltration    Recurrent hypoglycemia due to sepsis   Colitis on CT diabetes    Recommendations:   Oxygen by nasal cannula   Incentive spirometer every hour awake   DuoNeb p.r.n.    NPO /monitor mental status   Check blood culture sensitivity   Rocephin status post Vanco /ID consult   GI consult /MRCP/ monitor liver function test and coags  Monitor blood pressure off Levophed to map more than 65    /hr/ off Bicarb drip /monitor urine output creatinine and sodium   Monitor troponins   Monitor blood sugars   Peripheral blood  smear   Nephrology on consult    Discussed with RN   Palliative care consult /code status  Bedrest   Peptic ulcer disease prophylaxis   DVT prophylaxis    Demetrio Dunlap MD, MD, [facility]  Pulmonary Critical Care and Sleep Medicine,  [facility]  Cell: [phone]  Office: [phone]

## 2022-09-10 NOTE — PROGRESS NOTES
RN sent message to Birtha Members NP about possible vagal episode and pt unresponsiveness. \"Patient was up to commode, started agonal breathing then became unresponsive. Got him back to he is now responsive but less appropriate responsiveness than he had been. RN attending to him would like you to come assess him. It may have just been a vagal response but they want you  to assess please. \"  Awaiting response.

## 2022-09-10 NOTE — PLAN OF CARE
Problem: Discharge Planning  Goal: Discharge to home or other facility with appropriate resources  Outcome: Progressing  Flowsheets (Taken 9/9/2022 2000)  Discharge to home or other facility with appropriate resources: Identify barriers to discharge with patient and caregiver     Problem: Pain  Goal: Verbalizes/displays adequate comfort level or baseline comfort level  Outcome: Progressing     Problem: Safety - Adult  Goal: Free from fall injury  Outcome: Progressing     Problem: Chronic Conditions and Co-morbidities  Goal: Patient's chronic conditions and co-morbidity symptoms are monitored and maintained or improved  Outcome: Progressing  Flowsheets (Taken 9/9/2022 2000)  Care Plan - Patient's Chronic Conditions and Co-Morbidity Symptoms are Monitored and Maintained or Improved: Monitor and assess patient's chronic conditions and comorbid symptoms for stability, deterioration, or improvement     Problem: Neurosensory - Adult  Goal: Achieves stable or improved neurological status  Outcome: Progressing  Flowsheets (Taken 9/9/2022 2000)  Achieves stable or improved neurological status:   Assess for and report changes in neurological status   Maintain blood pressure and fluid volume within ordered parameters to optimize cerebral perfusion and minimize risk of hemorrhage     Problem: Respiratory - Adult  Goal: Achieves optimal ventilation and oxygenation  Outcome: Progressing     Problem: Cardiovascular - Adult  Goal: Absence of cardiac dysrhythmias or at baseline  Outcome: Progressing  Flowsheets (Taken 9/9/2022 2000)  Absence of cardiac dysrhythmias or at baseline:   Monitor cardiac rate and rhythm   Assess for signs of decreased cardiac output     Problem: Skin/Tissue Integrity - Adult  Goal: Skin integrity remains intact  Outcome: Progressing  Flowsheets (Taken 9/9/2022 2000)  Skin Integrity Remains Intact: Monitor for areas of redness and/or skin breakdown  Goal: Oral mucous membranes remain intact  Outcome: Progressing  Flowsheets (Taken 9/9/2022 2000)  Oral Mucous Membranes Remain Intact: Assess oral mucosa and hygiene practices     Problem: Musculoskeletal - Adult  Goal: Return mobility to safest level of function  Outcome: Progressing  Flowsheets (Taken 9/9/2022 2000)  Return Mobility to Safest Level of Function: Assess patient stability and activity tolerance for standing, transferring and ambulating with or without assistive devices  Goal: Maintain proper alignment of affected body part  Outcome: Progressing  Flowsheets (Taken 9/9/2022 2000)  Maintain proper alignment of affected body part:  Instruct and reinforce with patient and family use of appropriate assistive device and precautions (e.g. spinal or hip dislocation precautions)  Goal: Return ADL status to a safe level of function  Outcome: Progressing  Flowsheets (Taken 9/9/2022 2000)  Return ADL Status to a Safe Level of Function:   Administer medication as ordered   Assess activities of daily living deficits and provide assistive devices as needed   Obtain physical therapy/occupational therapy consults as needed   Assist and instruct patient to increase activity and self care as tolerated     Problem: Gastrointestinal - Adult  Goal: Minimal or absence of nausea and vomiting  Outcome: Progressing  Flowsheets (Taken 9/9/2022 2000)  Minimal or absence of nausea and vomiting:   Administer IV fluids as ordered to ensure adequate hydration   Advance diet as tolerated, if ordered   Nutrition consult to assist patient with adequate nutrition and appropriate food choices  Goal: Maintains or returns to baseline bowel function  Outcome: Progressing  Flowsheets (Taken 9/9/2022 2000)  Maintains or returns to baseline bowel function:   Assess bowel function   Administer IV fluids as ordered to ensure adequate hydration   Administer ordered medications as needed   Encourage mobilization and activity   Nutrition consult to assist patient with appropriate food choices  Goal: as ordered   Monitor response to electrolyte replacements, including repeat lab results as appropriate  Goal: Hemodynamic stability and optimal renal function maintained  Outcome: Progressing  Flowsheets (Taken 9/9/2022 2000)  Hemodynamic stability and optimal renal function maintained:   Monitor labs and assess for signs and symptoms of volume excess or deficit   Monitor intake, output and patient weight  Goal: Glucose maintained within prescribed range  Outcome: Progressing  Flowsheets (Taken 9/9/2022 2000)  Glucose maintained within prescribed range:   Monitor blood glucose as ordered   Assess for signs and symptoms of hyperglycemia and hypoglycemia     Problem: Hematologic - Adult  Goal: Maintains hematologic stability  Outcome: Progressing  Flowsheets (Taken 9/9/2022 2000)  Maintains hematologic stability: Assess for signs and symptoms of bleeding or hemorrhage

## 2022-09-10 NOTE — CONSULTS
[facility]  Initial Consult Note  Date: 9/10/2022    Hospital day :2     Impression:   E. coli sepsis  E. coli urinary tract infection  Leukocytosis  Colitis on CT imaging  Acute kidney injury  Diabetes mellitus type 2  Chronic back pain    Recommendations   Patient can continue on IV ceftriaxone 1 g every 24 hours  Blood cultures and urine culture positive for E. coli, susceptibilities are pending  We will continue to follow susceptibilities and will adjust therapy accordingly    Chief complaint/reason for consultation:   Sepsis    History of Present Illness:   Carmelita Blum is a 80y.o.-year-old male who was initially admitted on 9/8/2022. Patient has known medical history of chronic back pain and diabetes mellitus type 2. Patient was brought to the emergency department at the discretion of his wife for altered mental status. He apparently took too many sleeping pills, stating that he wanted to fall asleep extra fast.  Upon arrival, blood sugars were in the 30s he did receive dextrose. Urinalysis with small amounts of leukoesterase, many bacteria. CT abdomen with diffuse wall thickening of the ascending and transverse colon which may represent colitis, questionable rectal wall thickening which may represent proctitis, bladder wall thickening which may represent cystitis. The patient denies any abdominal pain or back pain. He denies any fevers or chills, or dysuria prior to admission. Blood cultures and urine culture have not come back positive for E. coli. He is currently on IV ceftriaxone. We have been consulted assist with the microbial therapy management    I have personally reviewed the past medical history, past surgical history, medications, social history, and family history, and I have updated the database accordingly.   Past Medical History:     Past Medical History:   Diagnosis Date    Chronic back pain     Diabetes mellitus ([facility] [address]      Past Surgical  History:     Past Surgical History:   Procedure Laterality Date    BARIATRIC SURGERY       Medications:      midodrine  10 mg Oral TID WC    potassium bicarb-citric acid  20 mEq Oral 4x Daily    cefTRIAXone (ROCEPHIN) IV  2,000 mg IntraVENous Q24H    pantoprazole (PROTONIX) 40 mg injection  40 mg IntraVENous Daily    sodium chloride flush  5-40 mL IntraVENous 2 times per day     Social History:     Social History     Socioeconomic History    Marital status:      Spouse name: Not on file    Number of children: Not on file    Years of education: Not on file    Highest education level: Not on file   Occupational History    Not on file   Tobacco Use    Smoking status: Unknown    Smokeless tobacco: Not on file   Substance and Sexual Activity    Alcohol use: Not Currently    Drug use: Not on file    Sexual activity: Not on file   Other Topics Concern    Not on file   Social History Narrative    Not on file     Social Determinants of Health     Financial Resource Strain: Not on file   Food Insecurity: Not on file   Transportation Needs: Not on file   Physical Activity: Not on file   Stress: Not on file   Social Connections: Not on file   Intimate Partner Violence: Not on file   Housing Stability: Not on file     Family History:     Family History   Problem Relation Age of Onset    Heart Attack Sister       Allergies:   Cefazolin     Review of Systems:   General: No fevers or chills. Eyes: No double vision or blurry vision. ENT: No sore throat or runny nose. Cardiovascular: No chest pain or palpitations. Lung: No shortness of breath or cough. Abdomen: No nausea, vomiting, diarrhea, or abdominal pain. Genitourinary: No increased urinary frequency, or dysuria. Musculoskeletal: No muscle aches or pains. Hematologic: No bleeding or bruising. Neurologic: No headache, weakness, numbness, or tingling.     Physical Examination :   /79   Pulse 89   Temp 97.1 °F (36.2 °C) (Temporal)   Resp 21   Ht 5' 9\" (1.753 m)   Wt 171 lb Studies:   CT HEAD WO CONTRAST    Result Date: 9/8/2022  No acute intracranial abnormality. Prominent CSF spaces over the frontal and parietal lobes. No evidence of hydrocephalus. US GALLBLADDER RUQ    Result Date: 9/9/2022  1. Technically limited exam.  The gallbladder is not clearly identified. Based on recent CT exam, the gallbladder was relatively contracted with intermediate density fluid and stones. 2.  No evidence for biliary dilatation. 3.  Morphologic findings suggestive of cirrhosis with small perihepatic ascites and probable cyst.     XR CHEST PORTABLE    Result Date: 9/9/2022  . Right internal jugular line terminates at the superior caval atrial junction. 2.  Shallow inflation with more prominent perihilar opacities. Similar appearance of peribronchial wall thickening. While this in part may represent atelectasis, developing edema should be considered. XR CHEST 1 VIEW    Result Date: 9/8/2022  Suboptimal evaluation secondary to patient positioning/rotation. No radiographic evidence of an acute cardiopulmonary process. US RETROPERITONEAL COMPLETE    Result Date: 9/8/2022  nremarkable ultrasound of the kidneys and urinary bladder. CT CHEST ABDOMEN PELVIS WO CONTRAST    Result Date: 9/8/2022        Cultures:     Culture, Blood 1 [[id number]] (Abnormal) Collected: 09/08/22 1419   Order Status: Completed Specimen: Blood Updated: 09/09/22 2017    Specimen Description . BLOOD    Special Requests RFA 12ML    Culture POSITIVE Blood Culture Abnormal      DIRECT GRAM STAIN FROM BOTTLE: GRAM NEGATIVE RODS     Filmarray BCID Detected: Escherichia coli Detected: Methodology- Polymerase Chain Reaction (PCR)     ESCHERICHIA COLI Abnormal      (NOTE) Direct Gram Stain from bottle and Polymerase Chain Reaction (PCR) results called to and read back by:JORDAN ORTIZ 9/9/22 @0420   Culture, Urine [[id number]] (Abnormal) Collected: 09/08/22 0961   Order Status: Completed Specimen: Urine, clean catch Updated: 09/09/22 1807    Specimen Description . CLEAN CATCH URINE    Culture ESCHERICHIA COLI >[id number] CFU/ML Abnormal    Culture, Blood 1 [[id number]] (Abnormal) Collected: 09/08/22 1410   Order Status: Completed Specimen: Blood Updated: 09/09/22 0426    Specimen Description . BLOOD    Special Requests LFA 12ML    Culture POSITIVE Blood Culture Abnormal      DIRECT GRAM STAIN FROM BOTTLE: GRAM NEGATIVE RODS     (NOTE) Direct Gram Stain from bottle result called to and read back by:JORDAN ORTIZ 9/9/22 @0420         Thank you for allowing us to participate in the care of this patient. Please call with questions. Electronically signed by DIONICIO Em CNP on 9/10/2022 at 6:38 AM      [facility]  [place] [address], [address] messaging  OFFICE: [phone]      This note is created with the assistance of a speech recognition program.  While intending to generate a document that actually reflects the content of the visit, the document can still have some errors including those of syntax and sound a like substitutions which may escape proof reading. In such instances, actual meaning can be extrapolated by contextual diversion.

## 2022-09-10 NOTE — PROGRESS NOTES
Stayed with patient to repositioned patient and reassessed mentation. Patient responding, talking appropriately, and answering questions. Patient stated he wasn't in any pain and is sleepy. Will reassess patient's mentation and comfort when giving night time meds.

## 2022-09-11 PROBLEM — N39.0 E. COLI UTI (URINARY TRACT INFECTION): Status: ACTIVE | Noted: 2022-09-11

## 2022-09-11 PROBLEM — B96.20 BACTEREMIA DUE TO ESCHERICHIA COLI: Status: ACTIVE | Noted: 2022-09-11

## 2022-09-11 PROBLEM — K74.60 CIRRHOSIS OF LIVER WITHOUT ASCITES (HCC): Status: ACTIVE | Noted: 2022-09-11

## 2022-09-11 PROBLEM — N18.9 CKD (CHRONIC KIDNEY DISEASE): Status: ACTIVE | Noted: 2022-09-11

## 2022-09-11 PROBLEM — R78.81 BACTEREMIA DUE TO ESCHERICHIA COLI: Status: ACTIVE | Noted: 2022-09-11

## 2022-09-11 PROBLEM — B96.20 E. COLI UTI (URINARY TRACT INFECTION): Status: ACTIVE | Noted: 2022-09-11

## 2022-09-11 LAB
ABSOLUTE EOS #: 0 K/UL (ref 0–0.4)
ABSOLUTE IMMATURE GRANULOCYTE: 0.32 K/UL (ref 0–0.3)
ABSOLUTE LYMPH #: 0.95 K/UL (ref 1–4.8)
ABSOLUTE MONO #: 0.64 K/UL (ref 0.2–0.8)
ALBUMIN SERPL-MCNC: 1.7 G/DL (ref 3.5–5.2)
ALP BLD-CCNC: 199 U/L (ref 40–129)
ALT SERPL-CCNC: 20 U/L (ref 5–41)
ANION GAP SERPL CALCULATED.3IONS-SCNC: 9 MMOL/L (ref 9–17)
AST SERPL-CCNC: 42 U/L
BASOPHILS # BLD: 0 %
BASOPHILS ABSOLUTE: 0 K/UL (ref 0–0.2)
BILIRUB SERPL-MCNC: 3.7 MG/DL (ref 0.3–1.2)
BUN BLDV-MCNC: 56 MG/DL (ref 8–23)
BUN/CREAT BLD: 17 (ref 9–20)
CALCIUM SERPL-MCNC: 7.8 MG/DL (ref 8.6–10.4)
CHLORIDE BLD-SCNC: 94 MMOL/L (ref 98–107)
CO2: 26 MMOL/L (ref 20–31)
CREAT SERPL-MCNC: 3.35 MG/DL (ref 0.7–1.2)
EOSINOPHILS RELATIVE PERCENT: 0 % (ref 1–4)
GFR AFRICAN AMERICAN: 21 ML/MIN
GFR NON-AFRICAN AMERICAN: 18 ML/MIN
GFR SERPL CREATININE-BSD FRML MDRD: ABNORMAL ML/MIN/{1.73_M2}
GLUCOSE BLD-MCNC: 60 MG/DL (ref 70–99)
GLUCOSE BLD-MCNC: 61 MG/DL (ref 75–110)
GLUCOSE BLD-MCNC: 63 MG/DL (ref 75–110)
GLUCOSE BLD-MCNC: 78 MG/DL (ref 75–110)
GLUCOSE BLD-MCNC: 89 MG/DL (ref 75–110)
GLUCOSE BLD-MCNC: 98 MG/DL (ref 75–110)
HCT VFR BLD CALC: 31.4 % (ref 40.7–50.3)
HEMOGLOBIN: 11.3 G/DL (ref 13–17)
IMMATURE GRANULOCYTES: 1 %
INR BLD: 1.7
LYMPHOCYTES # BLD: 3 % (ref 24–44)
MAGNESIUM: 1.8 MG/DL (ref 1.6–2.6)
MCH RBC QN AUTO: 30.5 PG (ref 25.2–33.5)
MCHC RBC AUTO-ENTMCNC: 36 G/DL (ref 28.4–34.8)
MCV RBC AUTO: 84.9 FL (ref 82.6–102.9)
MONOCYTES # BLD: 2 % (ref 1–7)
MORPHOLOGY: ABNORMAL
NRBC AUTOMATED: 0 PER 100 WBC
PDW BLD-RTO: 16.6 % (ref 11.8–14.4)
PLATELET # BLD: 87 K/UL (ref 138–453)
PMV BLD AUTO: 10.5 FL (ref 8.1–13.5)
POTASSIUM SERPL-SCNC: 5 MMOL/L (ref 3.7–5.3)
PROTHROMBIN TIME: 19.7 SEC (ref 11.5–14.2)
RBC # BLD: 3.7 M/UL (ref 4.21–5.77)
SEG NEUTROPHILS: 94 % (ref 36–66)
SEGMENTED NEUTROPHILS ABSOLUTE COUNT: 29.89 K/UL (ref 1.8–7.7)
SODIUM BLD-SCNC: 129 MMOL/L (ref 135–144)
TOTAL PROTEIN, URINE: 21 MG/DL
TOTAL PROTEIN: 5.5 G/DL (ref 6.4–8.3)
WBC # BLD: 31.8 K/UL (ref 3.5–11.3)

## 2022-09-11 PROCEDURE — 6360000002 HC RX W HCPCS: Performed by: INTERNAL MEDICINE

## 2022-09-11 PROCEDURE — 85610 PROTHROMBIN TIME: CPT

## 2022-09-11 PROCEDURE — 2580000003 HC RX 258: Performed by: NURSE PRACTITIONER

## 2022-09-11 PROCEDURE — 2580000003 HC RX 258: Performed by: EMERGENCY MEDICINE

## 2022-09-11 PROCEDURE — 99232 SBSQ HOSP IP/OBS MODERATE 35: CPT | Performed by: INTERNAL MEDICINE

## 2022-09-11 PROCEDURE — 80053 COMPREHEN METABOLIC PANEL: CPT

## 2022-09-11 PROCEDURE — 83735 ASSAY OF MAGNESIUM: CPT

## 2022-09-11 PROCEDURE — 36415 COLL VENOUS BLD VENIPUNCTURE: CPT

## 2022-09-11 PROCEDURE — 2580000003 HC RX 258: Performed by: INTERNAL MEDICINE

## 2022-09-11 PROCEDURE — 6370000000 HC RX 637 (ALT 250 FOR IP): Performed by: INTERNAL MEDICINE

## 2022-09-11 PROCEDURE — 82947 ASSAY GLUCOSE BLOOD QUANT: CPT

## 2022-09-11 PROCEDURE — C9113 INJ PANTOPRAZOLE SODIUM, VIA: HCPCS | Performed by: INTERNAL MEDICINE

## 2022-09-11 PROCEDURE — APPSS30 APP SPLIT SHARED TIME 16-30 MINUTES: Performed by: NURSE PRACTITIONER

## 2022-09-11 PROCEDURE — A4216 STERILE WATER/SALINE, 10 ML: HCPCS | Performed by: INTERNAL MEDICINE

## 2022-09-11 PROCEDURE — 85025 COMPLETE CBC W/AUTO DIFF WBC: CPT

## 2022-09-11 PROCEDURE — 6360000002 HC RX W HCPCS: Performed by: NURSE PRACTITIONER

## 2022-09-11 PROCEDURE — 2000000000 HC ICU R&B

## 2022-09-11 RX ADMIN — SODIUM CHLORIDE: 9 INJECTION, SOLUTION INTRAVENOUS at 00:37

## 2022-09-11 RX ADMIN — SODIUM CHLORIDE: 9 INJECTION, SOLUTION INTRAVENOUS at 14:42

## 2022-09-11 RX ADMIN — CEFTRIAXONE SODIUM 1000 MG: 1 INJECTION, POWDER, FOR SOLUTION INTRAMUSCULAR; INTRAVENOUS at 13:42

## 2022-09-11 RX ADMIN — SODIUM CHLORIDE 40 MG: 9 INJECTION, SOLUTION INTRAMUSCULAR; INTRAVENOUS; SUBCUTANEOUS at 08:25

## 2022-09-11 RX ADMIN — SODIUM CHLORIDE: 9 INJECTION, SOLUTION INTRAVENOUS at 03:10

## 2022-09-11 RX ADMIN — MIDODRINE HYDROCHLORIDE 10 MG: 10 TABLET ORAL at 18:39

## 2022-09-11 RX ADMIN — SODIUM CHLORIDE, PRESERVATIVE FREE 10 ML: 5 INJECTION INTRAVENOUS at 08:32

## 2022-09-11 RX ADMIN — DEXTROSE MONOHYDRATE 125 ML: 100 INJECTION, SOLUTION INTRAVENOUS at 07:03

## 2022-09-11 RX ADMIN — SODIUM CHLORIDE: 9 INJECTION, SOLUTION INTRAVENOUS at 03:03

## 2022-09-11 RX ADMIN — MIDODRINE HYDROCHLORIDE 10 MG: 10 TABLET ORAL at 08:25

## 2022-09-11 ASSESSMENT — ENCOUNTER SYMPTOMS
CHOKING: 0
WHEEZING: 0
CHEST TIGHTNESS: 0
SHORTNESS OF BREATH: 0
BACK PAIN: 1
CONSTIPATION: 0
SINUS PRESSURE: 0
COUGH: 0
ABDOMINAL PAIN: 0
NAUSEA: 0
GASTROINTESTINAL NEGATIVE: 1
VOICE CHANGE: 0
RESPIRATORY NEGATIVE: 1
DIARRHEA: 0
VOMITING: 0
RHINORRHEA: 0

## 2022-09-11 ASSESSMENT — PAIN SCALES - GENERAL: PAINLEVEL_OUTOF10: 0

## 2022-09-11 NOTE — PROGRESS NOTES
[facility]  Progress Note    Quincy Kim  MRN: [id number]  Date: 9/11/2022  LOS: 3     Reason for F/U :   E. coli sepsis    Impression :   E. coli sepsis secondary to E. coli urinary tract infection  Colitis by CT imaging  Leukocytosis  Acute kidney injury  Diabetes mellitus type 2  Chronic back pain    Recommendations:   Continue intravenous antimicrobial therapy with Rocephin  The patient clinically does not have any complaints  He will need a total of 10 to 14 days of antimicrobial coverage    Infection Control Recommendations:   Universal precautions    Discharge Planning:   Estimated Length of IV antimicrobials: To be determined  Patient will need Midline Catheter Insertion/ PICC line Insertion: No  Patient will need: Home IV , [place],  SNF,  LTAC: Undetermined  Patient willneed outpatient wound care: No    Medical Decision making / Summary of Stay:   Quincy Kim is a 80y.o.-year-old male who was initially admitted on 9/8/2022. Patient has known medical history of chronic back pain and diabetes mellitus type 2. Patient was brought to the emergency department at the discretion of his wife for altered mental status. He apparently took too many sleeping pills, stating that he wanted to fall asleep extra fast.  Upon arrival, blood sugars were in the 30s he did receive dextrose. Urinalysis with small amounts of leukoesterase, many bacteria. CT abdomen with diffuse wall thickening of the ascending and transverse colon which may represent colitis, questionable rectal wall thickening which may represent proctitis, bladder wall thickening which may represent cystitis. The patient denies any abdominal pain or back pain. He denies any fevers or chills, or dysuria prior to admission. Blood cultures and urine culture have not come back positive for E. coli. He is currently on IV ceftriaxone.   We have been consulted assist with the microbial therapy management    Current evaluation:2022    BP (!) 103/58   Pulse 88   Temp 96.8 °F (36 °C) (Temporal)   Resp 20   Ht 5' 9\" (1.753 m)   Wt 171 lb (77.6 kg)   SpO2 97%   BMI 25.25 kg/m²     Temperature Range: Temp: 96.8 °F (36 °C) Temp  Av °F (36.1 °C)  Min: 96.8 °F (36 °C)  Max: 97.1 °F (36.2 °C)  The patient is seen and evaluated at bedside and is awake and alert in no acute distress. No subjective fevers or chills. No cough or shortness of breath. No dysuria or frequency. Review of Systems   Constitutional: Negative. HENT: Negative. Respiratory: Negative. Cardiovascular: Negative. Gastrointestinal: Negative. Genitourinary: Negative. Musculoskeletal: Negative. Neurological: Negative. Psychiatric/Behavioral: Negative. Physical Examination :     Physical Exam  Constitutional:       Appearance: He is well-developed. HENT:      Head: Normocephalic and atraumatic. Cardiovascular:      Rate and Rhythm: Normal rate. Heart sounds: Normal heart sounds. No friction rub. No gallop. Pulmonary:      Effort: Pulmonary effort is normal.      Breath sounds: Normal breath sounds. No wheezing. Abdominal:      General: Bowel sounds are normal.      Palpations: Abdomen is soft. There is no mass. Tenderness: There is no abdominal tenderness. Musculoskeletal:         General: Normal range of motion. Cervical back: Normal range of motion and neck supple. Lymphadenopathy:      Cervical: No cervical adenopathy. Skin:     General: Skin is warm and dry. Neurological:      Mental Status: He is alert and oriented to person, place, and time.        Laboratory data:   I have independently reviewed the followinglabs:  CBC with Differential:   Recent Labs     09/10/22  0343 22  0507   WBC 54.3* 31.8*   HGB 11.3* 11.3*   HCT 32.1* 31.4*   * 87*   LYMPHOPCT 2* 3*   MONOPCT 3 2     BMP:   Recent Labs     09/10/22  0343 09/10/22  1325 22  0507   * 126* 129*   K 4.3 4.4 5.0   CL 91* 90* 94*   CO2 26 26 26   BUN 61* 56* 56*   CREATININE 3.82* 3.38* 3.35*   MG 1.8  --  1.8     Hepatic Function Panel:   Recent Labs     09/10/22  0343 09/11/22  0507   PROT 5.7* 5.5*   LABALBU 1.8* 1.7*   BILITOT 4.0* 3.7*   ALKPHOS 171* 199*   ALT 23 20   AST 43* 42*         No results found for: PROCAL  No results found for: CRP  No results found for: SEDRATE      No results found for: DDIMER  No results found for: FERRITIN  No results found for: LDH  No results found for: FIBRINOGEN    No results found for requested labs within last 30 days. No results found for: COVID19    No results for input(s): VANCOTROUGH in the last 72 hours. Imaging Studies:   ONE XRAY VIEW OF THE CHEST 9/9/2022 11:20 am  Impression   1. Right internal jugular line terminates at the superior caval atrial   junction. 2.  Shallow inflation with more prominent perihilar opacities. Similar   appearance of peribronchial wall thickening. While this in part may   represent atelectasis, developing edema should be considered. RIGHT UPPER QUADRANT ULTRASOUND 9/9/2022 6:58 am  Cultures:     Culture, Urine [[id number]] (Abnormal)  Collected: 09/08/22 5065   Order Status: Completed Specimen: Urine, clean catch Updated: 09/10/22 1310    Specimen Description . CLEAN CATCH URINE    Culture ESCHERICHIA COLI >[id number] CFU/ML Abnormal      Escherichia coli (1)    Antibiotic Interpretation Microscan Method Status    ampicillin Sensitive 4 BACTERIAL SUSCEPTIBILITY PANEL SANDRA Final    aztreonam Sensitive <=1 BACTERIAL SUSCEPTIBILITY PANEL SANDRA Final    ceFAZolin Sensitive <=4 BACTERIAL SUSCEPTIBILITY PANEL SANDRA Final     Cefazolin sensitivity results can be used to predict the effectiveness of oral   cephalosporins (eg.  Cephalexin) in uncomplicated Urinary Tract Infections due to E. coli, K.    pneumoniae, and P. mirabilis        cefTRIAXone Sensitive <=1 BACTERIAL SUSCEPTIBILITY PANEL SANDRA Final    ciprofloxacin Sensitive <=0.25 BACTERIAL SUSCEPTIBILITY PANEL SANDRA Final    Confirmatory Extended Spectrum Beta-Lactamase Negative NEGATIVE BACTERIAL SUSCEPTIBILITY PANEL SANDRA Final    gentamicin Sensitive <=1 BACTERIAL SUSCEPTIBILITY PANEL SANDRA Final    nitrofurantoin Sensitive <=16 BACTERIAL SUSCEPTIBILITY PANEL SANDRA Final    tobramycin Sensitive <=1 BACTERIAL SUSCEPTIBILITY PANEL SANDRA Final    trimethoprim-sulfamethoxazole Sensitive <=20 BACTERIAL SUSCEPTIBILITY PANEL SANDRA Final    piperacillin-tazobactam Sensitive <=4 BACTERIAL SUSCEPTIBILITY PANEL SANDRA Final          Specimen Collected: 09/08/22 17:44 EDT Last Resulted: 09/10/22 13:10 EDT        Culture, Blood 1 [[id number]] (Abnormal) Collected: 09/08/22 1410   Order Status: Completed Specimen: Blood Updated: 09/10/22 0808    Specimen Description . BLOOD    Special Requests LFA 12ML    Culture POSITIVE Blood Culture Abnormal      DIRECT GRAM STAIN FROM BOTTLE: GRAM NEGATIVE RODS     ESCHERICHIA COLI For susceptibility, refer to previous culture. Abnormal      (NOTE) Direct Gram Stain from bottle result called to and read back by:JORDAN ORTIZ 9/9/22 @0420   Culture, Blood 1 [[id number]] (Abnormal)  Collected: 09/08/22 1419   Order Status: Completed Specimen: Blood Updated: 09/10/22 0807    Specimen Description . BLOOD    Special Requests RFA 12ML    Culture POSITIVE Blood Culture Abnormal      DIRECT GRAM STAIN FROM BOTTLE: GRAM NEGATIVE RODS     Filmarray BCID Detected: Escherichia coli Detected: Methodology- Polymerase Chain Reaction (PCR)     ESCHERICHIA COLI Abnormal      (NOTE) Direct Gram Stain from bottle and Polymerase Chain Reaction (PCR) results called to and read back by:JORDAN ORTIZ 9/9/22 @0420     Escherichia coli (4)    Antibiotic Interpretation Microscan Method Status    ampicillin Sensitive 4 BACTERIAL SUSCEPTIBILITY PANEL SANDRA Final    aztreonam Sensitive <=1 BACTERIAL SUSCEPTIBILITY PANEL SANDRA Final    ceFAZolin Sensitive <=4 BACTERIAL SUSCEPTIBILITY PANEL SANDRA Final

## 2022-09-11 NOTE — PROGRESS NOTES
Occupational [address]  Occupational Therapy Not Seen Note    Patient not available for Occupational Therapy due to:    [] Testing:    [] Hemodialysis    [] Cancelled by RN:    [x]Refusal by Patient: 9/11: Pt adamantly refusing therapy at this time. Attempted to educate pt on purpose of OT eval pt reporting \"the only moving I want to do is sleep! \" Will continue to follow.      [] Surgery:     [] Intubation:     [] Pain Medication:    [] Sedation:     [] Spine Precautions :    [] Medical Instability:    [] Other:      Julissa Fine, OT

## 2022-09-11 NOTE — PROGRESS NOTES
[facility]  Office: [address], DO, Martha Karyna, DO, Claraannamaria Metcalf, DO, Sarah Clintonkirsten Gamboa, DO, Bryson Gavin MD, Alex Pa MD, Jessica Delgado MD, Hyun Farrar MD,  Braydon Back MD, Ren Mobley MD, Catalina Mobley, DO, Ignacio Carpenter MD,  Figueroa Madera MD, Kelsey Javed MD, Eleno Lechuga, DO, Franklin Salinas MD, Luke Montenegro MD, Yesy Vazquez MD, Anusha Salcido MD, Carito New MD, Maddy Chow MD, Korina Escalera, DO, Reena Ramirez MD, Dietrich Halsted, MD, Walker Ashton, CNP,  Vianey Posey, CNP, Leonard Whitney, CNP, Jazzy Boggs, CNP, Cici Quispe, PA-C, Ez Ortiz, [facility], Kelvin Aldana, CNP, Gris Rea, CNP, Dahlia Edwards, CNP, Sherwin Arce, CNP, Shahrzad Siddiqui, CNP, Geetha English, CNS, Agatha Zhao, [facility], Andrés Cintron, CNP, Rachel Menchaca, CNP, [facility]      Daily Progress Note     Admit Date: 9/8/2022  Bed/Room No.  1110/1110-01  Admitting Physician : Jessica Delgado MD  Code Status :Full Code  Hospital Day:  LOS: 3 days   Chief Complaint:     Chief Complaint   Patient presents with    Altered Mental Status     Principal Problem:    Acute kidney injury superimposed on CKD [facility])  Active Problems:    Hypoglycemia    Thrombocytopenia (HCC)    Leukocytosis    Hyponatremia    Hypokalemia    Hypotension    Colitis    Acute cystitis with hematuria    Proctitis    Cholelithiasis    Hypomagnesemia    Altered mental status    Elevated LFTs    Abdominal pain  Resolved Problems:    * No resolved hospital problems. *    Subjective : Interval History/Significant events :  09/11/22    Patient denies any abdominal pain, nausea, vomiting, diarrhea. Patient is breathing on room air. He is tolerating diet. Vitals - Stable afebrile, stable blood pressure. Labs -serum sodium 129, BUN 56, creatinine 3.35. Low albumin 1.7, leukocytosis improving 31.8.   Blood culture positive for E. coli, urine culture positive for E. coli. Nursing notes , Consults notes reviewed. Overnight events and updates discussed with Nursing staff . Background History:         Quincy Kim is 80 y.o. male  Who was admitted to the hospital on 9/8/2022 for treatment of Acute kidney injury superimposed on CKD ([facility] [address] Patient was brought to emergency room by ambulance after his wife called for patients change in mental status. Apparently patient overdosed on his sleep medication. He took medication within intent to fall asleep faster. Patient also reported that he has known history of arthritis and has been having difficulty with his joints. He was not moving much for last few days. He reports prior history of morbid obesity and weighted > 350 pounds. He underwent bariatric surgery > 25 years ago. He has prior history of diabetes mellitus however is not on any medication. Patient denies any other past medical history. On chart review it appears that patient has been admitted to [facility] with acute kidney injury and had elevated creatinine up to 3.5. Patient is very hard of hearing , he lives with his wife of > 35 Yrs. Patient denies any history of diarrhea, vomiting, nausea, fever. He denies any urinary difficulties, nocturia, retention. Initial evaluation emergency room showed hypotension with blood pressure 88/66 mmHg, blood glucose was significantly low 30's on first contact with EMS and repeat 60 on arrival to emergency room after dextrose. Lab evaluation showed hyponatremia 127, BUN 70, creatinine 4.34, bicarb 17, lactic acid 2.7, magnesium 1.3, troponin 58, albumin 1.8, AST 47, ALT 24, bilirubin 4.9. Alcohol levels negative. Urine tox was negative for cannabinoids, cocaine, methadone, fentanyl, oxycodone, amphetamine.   Patient was found to have significant leukocytosis 49.3 with low PLT 96 .  CT abdomen pelvis showed diffuse wall thickening in ascending, transverse colon suggestive of colitis, Temp Temp src Pulse Resp SpO2   09/11/22 0600 (!) 103/58 -- -- 88 20 97 %   09/11/22 0500 (!) 97/59 -- -- 85 13 97 %   09/11/22 0400 109/67 96.8 °F (36 °C) Temporal 83 13 97 %   09/11/22 0300 107/61 -- -- 81 17 99 %   09/11/22 0200 (!) 105/53 -- -- 75 13 96 %   09/11/22 0100 (!) 94/55 -- -- 79 13 98 %   09/11/22 0000 112/85 96.8 °F (36 °C) Temporal 81 15 96 %   09/10/22 2300 (!) 114/59 -- -- 61 15 92 %   09/10/22 2200 119/64 -- -- 72 13 97 %   09/10/22 2100 (!) 108/55 -- -- 71 13 97 %   09/10/22 2000 113/61 96.8 °F (36 °C) Temporal 60 12 97 %   09/10/22 1900 (!) 100/54 -- -- 68 14 98 %   09/10/22 1800 (!) 112/59 -- -- 74 12 97 %   09/10/22 1745 104/60 -- -- 81 18 99 %   09/10/22 1730 (!) 97/50 -- -- 76 15 97 %   09/10/22 1645 122/64 -- -- 73 13 97 %   09/10/22 1630 100/63 -- -- 74 15 99 %   09/10/22 1615 (!) 107/57 -- -- 74 14 97 %   09/10/22 1601 -- 97.1 °F (36.2 °C) Temporal -- -- --   09/10/22 1600 (!) 86/56 97.1 °F (36.2 °C) Temporal 81 17 99 %   09/10/22 1545 (!) 111/59 -- -- 76 14 97 %   09/10/22 1530 (!) 106/59 -- -- 78 13 98 %   09/10/22 1515 97/63 -- -- 76 15 96 %   09/10/22 1500 -- -- -- -- -- 97 %   09/10/22 1445 -- -- -- -- -- 96 %   09/10/22 1430 (!) 100/57 -- -- 85 14 92 %   09/10/22 1415 117/81 -- -- 90 21 --   09/10/22 1400 (!) 85/52 -- -- 86 16 98 %   09/10/22 1345 88/61 -- -- 93 21 95 %   09/10/22 1330 (!) 80/58 -- -- 88 16 96 %   09/10/22 1315 (!) 154/72 -- -- 64 13 95 %   09/10/22 1300 (!) 106/93 -- -- 82 14 97 %   09/10/22 1245 99/77 -- -- 86 21 98 %   09/10/22 1230 109/66 -- -- 84 16 97 %   09/10/22 1215 133/64 -- -- 87 12 96 %   09/10/22 1200 103/73 97.1 °F (36.2 °C) Temporal 84 14 98 %   09/10/22 1145 126/68 -- -- 80 14 95 %   09/10/22 1130 -- -- -- -- -- 97 %   09/10/22 1117 -- 97.1 °F (36.2 °C) Temporal -- -- --   09/10/22 1100 125/70 -- -- 89 14 96 %   09/10/22 1045 116/64 -- -- 83 14 97 %   09/10/22 1030 119/68 -- -- 83 14 96 %   09/10/22 1015 108/72 -- -- 75 15 96 %   09/10/22 1000 89/67 -- -- 73 16 94 %   09/10/22 0945 (!) 83/58 -- -- 79 16 93 %   09/10/22 0930 (!) 91/56 -- -- 67 11 96 %   09/10/22 0915 (!) 84/49 -- -- 75 15 94 %   09/10/22 0900 (!) 106/56 -- -- 81 16 95 %   09/10/22 0845 100/67 -- -- 92 22 96 %   09/10/22 0830 108/65 -- -- 84 12 96 %   09/10/22 0815 99/71 97.3 °F (36.3 °C) Temporal 87 16 96 %       Intake / output   09/09 1901 - 09/11 0700  In: 1540.9 [I.V.:1492.4]  Out: 1600 [Urine:1600]  Physical Exam:  Physical Exam  Vitals and nursing note reviewed. Constitutional:       General: He is not in acute distress. Appearance: He is not diaphoretic. HENT:      Head: Normocephalic and atraumatic. Nose:      Right Sinus: No maxillary sinus tenderness or frontal sinus tenderness. Left Sinus: No maxillary sinus tenderness or frontal sinus tenderness. Mouth/Throat:      Pharynx: No oropharyngeal exudate. Eyes:      General: No scleral icterus. Conjunctiva/sclera: Conjunctivae normal.      Pupils: Pupils are equal, round, and reactive to light. Neck:      Thyroid: No thyromegaly. Vascular: No JVD. Cardiovascular:      Rate and Rhythm: Normal rate and regular rhythm. Pulses:           Dorsalis pedis pulses are 2+ on the right side and 2+ on the left side. Heart sounds: Normal heart sounds. No murmur heard. Pulmonary:      Effort: Pulmonary effort is normal.      Breath sounds: Normal breath sounds. No wheezing or rales. Abdominal:      Palpations: Abdomen is soft. There is no mass. Tenderness: There is no abdominal tenderness. Comments: Surgical Scar    Musculoskeletal:      Cervical back: Full passive range of motion without pain and neck supple. Lymphadenopathy:      Head:      Right side of head: No submandibular adenopathy. Left side of head: No submandibular adenopathy. Cervical: No cervical adenopathy. Skin:     General: Skin is warm.    Neurological:      Mental Status: He is alert and oriented to person, place, and time. Motor: No tremor. Psychiatric:         Behavior: Behavior is cooperative. Laboratory findings:    Recent Labs     09/09/22  0635 09/09/22  1432 09/10/22  0343 09/11/22  0507   WBC 47.9* 49.3* 54.3* 31.8*   HGB 10.2* 10.6* 11.3* 11.3*   HCT 29.6* 31.1* 32.1* 31.4*   PLT 96* 111* 105* 87*   INR 1.9  --  1.7 1.7       Recent Labs     09/09/22  0635 09/09/22  1144 09/09/22  1259 09/10/22  0343 09/10/22  1325 09/11/22  0507   *  --    < > 128* 126* 129*   K 3.4*  --    < > 4.3 4.4 5.0   CL 99  --    < > 91* 90* 94*   CO2 18*  --    < > 26 26 26   GLUCOSE 133*  --    < > 97 89 60*   BUN 65*  --    < > 61* 56* 56*   CREATININE 3.91*  --    < > 3.82* 3.38* 3.35*   MG 2.0  --   --  1.8  --  1.8   CALCIUM 7.3*  --    < > 7.8* 7.7* 7.8*   PHOS  --  3.9  --   --   --   --     < > = values in this interval not displayed.        Recent Labs     09/08/22  1305 09/08/22  1751 09/09/22  0635 09/09/22  1432 09/10/22  0343 09/11/22  0507   PROT 5.6*  --  5.1*  --  5.7* 5.5*   LABALBU 1.8*  --  1.6*  --  1.8* 1.7*   TSH 2.98  --   --   --   --   --    AST 47*  --  39  --  43* 42*   ALT 24  --  20  --  23 20   ALKPHOS 188*  --  153*  --  171* 199*   BILITOT 4.9*  --  3.9*  --  4.0* 3.7*   AMMONIA 60  --   --  31  --   --    CKTOTAL  --  152  --   --   --   --             Specific Gravity, UA   Date Value Ref Range Status   09/08/2022 1.015 1.005 - 1.030 Final     Protein, UA   Date Value Ref Range Status   09/08/2022 1+ (A) NEGATIVE Final     RBC, UA   Date Value Ref Range Status   09/08/2022 20 TO 50 0 - 2 /HPF Final     Bacteria, UA   Date Value Ref Range Status   09/08/2022 MANY (A) None Final     Nitrite, Urine   Date Value Ref Range Status   09/08/2022 NEGATIVE NEGATIVE Final     WBC, UA   Date Value Ref Range Status   09/08/2022 5 TO 10 0 - 5 /HPF Final     Leukocyte Esterase, Urine   Date Value Ref Range Status   09/08/2022 SMALL (A) NEGATIVE Final       Imaging / Clinical Data :-   CT HEAD WO CONTRAST    Result Date: 9/8/2022  No acute intracranial abnormality. Prominent CSF spaces over the frontal and parietal lobes. No evidence of hydrocephalus. US GALLBLADDER RUQ    Result Date: 9/9/2022  1. Technically limited exam.  The gallbladder is not clearly identified. Based on recent CT exam, the gallbladder was relatively contracted with intermediate density fluid and stones. 2.  No evidence for biliary dilatation. 3.  Morphologic findings suggestive of cirrhosis with small perihepatic ascites and probable cyst.     XR CHEST PORTABLE    Result Date: 9/9/2022  1. Right internal jugular line terminates at the superior caval atrial junction. 2.  Shallow inflation with more prominent perihilar opacities. Similar appearance of peribronchial wall thickening. While this in part may represent atelectasis, developing edema should be considered. XR CHEST 1 VIEW    Result Date: 9/8/2022  Suboptimal evaluation secondary to patient positioning/rotation. No radiographic evidence of an acute cardiopulmonary process. US RETROPERITONEAL COMPLETE    Result Date: 9/8/2022  Unremarkable ultrasound of the kidneys and urinary bladder. CT CHEST ABDOMEN PELVIS WO CONTRAST    Result Date: 9/8/2022  1. Diffuse wall thickening of the ascending and transverse colon may represent colitis. 2. Questionable rectal wall thickening may represent proctitis. 3. Suspected cholelithiasis. Suboptimal evaluation of gallbladder fossa. Can correlate with right upper quadrant ultrasound. 4. Mild bladder wall thickening may be related to underdistension versus possible cystitis. Can correlate with urinalysis. 5. Small amount of free fluid present around the liver and spleen. 6. Mild bilateral lower lobe patchy opacities are favored to represent atelectasis. However, an early inflammatory/infectious process is not excluded in the proper clinical setting. 7. Diffuse subcutaneous fat stranding may related to fluid overload.  8. Hypodense blood pool, may be related to the anemia. Clinical Course : stable  Assessment and Plan  :        Acute renal failure likely has underlying chronic kidney disease stage III. Unsure of his  baseline likely 1.5-2.0. IV fluids with bicarb infusion, maintain Medeiros catheter. Kidney ultrasound does not show any evidence of medical renal disease. Likely has ischemic ATN from hypotension and dehydration. follow Culture sensitivity. Nephrology following , continue urinary catheter. E. coli bacteremia with sepsis secondary to UTI- Rocephin 2 Gm daily . Underlying type 2 diabetes mellitus with hypoglycemia - Not on medications. Acute Cystitis - On antibiotics   Drug overdose-accidental -   Severe hypoglycemia -dextrose infusion, monitor blood sugars every 2 Hrs for now . Moderate malnutrition -     Awaiting return of kidney function. Maintaining his BP with MAP > 60 mmHg. Continue to monitor vitals , Intake / output ,  Cell count , HGB , Kidney function, oxygenation  as indicated . Plan and updates discussed with patient ,  answers  explained to satisfaction.    Plan discussed with Staff  RN     (Please note that portions of this note were completed with a voice recognition program. Efforts were made to edit the dictations but occasionally words are mis-transcribed.)      Joshua Kinney MD  9/11/2022

## 2022-09-11 NOTE — PROGRESS NOTES
Nephrology Progress Note    Patient:  Darby Cisneros; 80 y.o. MRN# [id number]  Location:    Attending:  Ariel Rasmsusen MD  Admit Date:  2022   Hospital Day: 3    Subjective   Patient  admitted on 2022 for AMS/lethargy found to be hypoglycemic, hypotensive with E. coli bacteremia.   We have been consulted for HINA with hyponatremia    Patient seen and evaluated in room, no acute events overnight  Vital signs stable, Levophed weaned off on 9/10/2022, patient's blood pressure has been stable tolerating midodrine 3 times daily    Sodium levels essentially unchanged from yesterday currently 129 up from 128    Hypoglycemic this morning    CRT downtrending currently 3.35 down from 3.82 yesterday  White count downtrending currently 31.8 down from 54.3 continues on rocephin for E. coli bacteremia per infectious disease recommendations    UOP documented at 1.075 L over the last 24 hours with approximately 1 L fluid balance since admission    Recent Labs     09/10/22  0343 09/10/22  1325 22  0507   * 126* 129*   K 4.3 4.4 5.0   CL 91* 90* 94*   CO2 26 26 26   BUN 61* 56* 56*   CREATININE 3.82* 3.38* 3.35*   GLUCOSE 97 89 60*   CALCIUM 7.8* 7.7* 7.8*         Objective   VS: BP (!) 103/58   Pulse 88   Temp 96.8 °F (36 °C) (Temporal)   Resp 20   Ht 5' 9\" (1.753 m)   Wt 171 lb (77.6 kg)   SpO2 97%   BMI 25.25 kg/m²   MAXIMUM TEMPERATURE OVER 24 HRS:  Temp (24hrs), Av °F (36.1 °C), Min:96.8 °F (36 °C), Max:97.1 °F (36.2 °C)    24 HR BLOOD PRESSURE RANGE:  Systolic (21YFG), SGI:384 , Min:80 , QHE:388   ; Diastolic (96TGI), EWX:90, Min:50, Max:93    24 HR INTAKE/OUTPUT:    Intake/Output Summary (Last 24 hours) at 2022 1114  Last data filed at 2022 1109  Gross per 24 hour   Intake 1553.05 ml   Output 1075 ml   Net 478.05 ml       WEIGHT: Patient Vitals for the past 96 hrs (Last 3 readings):   Weight   22 1830 171 lb (77.6 kg)   22 1243 200 lb (90.7 kg)         Current Medications    Scheduled Meds:    cefTRIAXone (ROCEPHIN) IV  1,000 mg IntraVENous Q24H    midodrine  10 mg Oral TID WC    pantoprazole (PROTONIX) 40 mg injection  40 mg IntraVENous Daily    sodium chloride flush  5-40 mL IntraVENous 2 times per day     Continuous Infusions:    sodium chloride 100 mL/hr at 09/11/22 0303    norepinephrine Stopped (09/10/22 1312)    dextrose      sodium chloride 20 mL/hr at 09/11/22 0310     PRN Meds:  glucose, dextrose bolus **OR** dextrose bolus, glucagon (rDNA), dextrose, sodium chloride flush, sodium chloride, ondansetron **OR** ondansetron, acetaminophen **OR** acetaminophen    Physical Examination     General:  AAO x 3, speaking in full sentences, no accessory muscle use, Kiana  Chest:   Bilateral vesicular breath sounds, no rales or wheezes. Cardiac:  S1 S2 RR, no murmurs, gallops or rubs, JVP not raised. Abdomen: Soft, non-tender, non distended, BS audible. :  Catheter appreciated with clear yellow urine  SKIN:  No rashes, flaccid skin turgor, with ecchymosis to left upper extremity  Extremities:  No edema, no clubbing, No cyanosis  Neuro:  AAO x 3, No FND.      Labs      Recent Labs     09/09/22  1432 09/10/22  0343 09/11/22  0507   WBC 49.3* 54.3* 31.8*   RBC 3.45* 3.74* 3.70*   HGB 10.6* 11.3* 11.3*   HCT 31.1* 32.1* 31.4*   MCV 90.1 85.8 84.9   MCH 30.7 30.2 30.5   MCHC 34.1 35.2* 36.0*   RDW 17.0* 16.2* 16.6*   * 105* 87*   MPV 11.1 10.0 10.5        BMP:   Recent Labs     09/10/22  0343 09/10/22  1325 09/11/22  0507   * 126* 129*   K 4.3 4.4 5.0   CL 91* 90* 94*   CO2 26 26 26   BUN 61* 56* 56*   CREATININE 3.82* 3.38* 3.35*   GLUCOSE 97 89 60*   CALCIUM 7.8* 7.7* 7.8*        Phosphorus:     Recent Labs     09/09/22  1144   PHOS 3.9       Magnesium:    Recent Labs     09/09/22  0635 09/10/22  0343 09/11/22  0507   MG 2.0 1.8 1.8       Albumin:    Recent Labs     09/09/22  0635 09/10/22  0343 09/11/22  0507   LABALBU 1.6* 1.8* 1.7* Urinalysis/Chemistries      Lab Results   Component Value Date/Time    NITRU NEGATIVE 09/08/2022 03:33 PM    COLORU Dark Yellow 09/08/2022 03:33 PM    PHUR 5.5 09/08/2022 03:33 PM    WBCUA 5 TO 10 09/08/2022 03:33 PM    RBCUA 20 TO 50 09/08/2022 03:33 PM    MUCUS NOT REPORTED 07/31/2019 07:58 AM    TRICHOMONAS NOT REPORTED 07/31/2019 07:58 AM    YEAST NOT REPORTED 07/31/2019 07:58 AM    BACTERIA MANY 09/08/2022 03:33 PM    SPECGRAV 1.015 09/08/2022 03:33 PM    LEUKOCYTESUR SMALL 09/08/2022 03:33 PM    UROBILINOGEN Normal 09/08/2022 03:33 PM    BILIRUBINUR  09/08/2022 03:33 PM     Presumptive positive. Unable to confirm due to unavailability of reagent. GLUCOSEU NEGATIVE 09/08/2022 03:33 PM    KETUA TRACE 09/08/2022 03:33 PM    AMORPHOUS NOT REPORTED 07/31/2019 07:58 AM       Radiology    CT HEAD WO CONTRAST    Result Date: 9/8/2022  No acute intracranial abnormality. Prominent CSF spaces over the frontal and parietal lobes. No evidence of hydrocephalus. US GALLBLADDER RUQ    Result Date: 9/9/2022  1. Technically limited exam.  The gallbladder is not clearly identified. Based on recent CT exam, the gallbladder was relatively contracted with intermediate density fluid and stones. 2.  No evidence for biliary dilatation. 3.  Morphologic findings suggestive of cirrhosis with small perihepatic ascites and probable cyst.     XR CHEST PORTABLE    Result Date: 9/9/2022  1. Right internal jugular line terminates at the superior caval atrial junction. 2.  Shallow inflation with more prominent perihilar opacities. Similar appearance of peribronchial wall thickening. While this in part may represent atelectasis, developing edema should be considered. XR CHEST 1 VIEW    Result Date: 9/8/2022  Suboptimal evaluation secondary to patient positioning/rotation. No radiographic evidence of an acute cardiopulmonary process.      US RETROPERITONEAL COMPLETE    Result Date: 9/8/2022  Unremarkable ultrasound of the kidneys and urinary bladder. CT CHEST ABDOMEN PELVIS WO CONTRAST    Result Date: 9/8/2022  1. Diffuse wall thickening of the ascending and transverse colon may represent colitis. 2. Questionable rectal wall thickening may represent proctitis. 3. Suspected cholelithiasis. Suboptimal evaluation of gallbladder fossa. Can correlate with right upper quadrant ultrasound. 4. Mild bladder wall thickening may be related to underdistension versus possible cystitis. Can correlate with urinalysis. 5. Small amount of free fluid present around the liver and spleen. 6. Mild bilateral lower lobe patchy opacities are favored to represent atelectasis. However, an early inflammatory/infectious process is not excluded in the proper clinical setting. 7. Diffuse subcutaneous fat stranding may related to fluid overload. 8. Hypodense blood pool, may be related to the anemia. Assessment    HINA due to ischemic ATN from septic shock, low flow, intravascular volume depletion from decreased intake, baseline creatinine 1.8-2.0 which peaked up to 4.3, plateaued around 3.8, nonoliguric expected to improve, currently at 3.3  CKD stage 3B to 4 with a baseline CRT between 1.8 and 2.0. 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 US showing right kidney 8.2 cm, left 10 cm. previous work-up for immune complex, pauci-immune, anti-GBM and paraprotein disease negative.  Follows Dr Soni Brain  Anion gap and nongap metabolic acidosis, resolved  Hypovolemic hyponatremia resolving sodium up to 129 had dropped down to 123  E. coli bacteremia with 2 out of 2 blood cultures positive for E. coli in urine culture positive  Septic shock from E. coli bacteremia requiring pressor support, has been weaned off levo since 9/10/2020  Colitis identified on CT  Obstructive jaundice with hyperbilirubinemia elevated ALP and cholelithiasis on imaging  DMT2, diet controlled, currently hypoglycemic  Leukocytosis  Normocytic normochromic anemia  Thrombocytopenia   History of bariatric surgery    Plan   Continue normal saline at 100 mL an hour  Continue midodrine 3 times daily  Antibiotics per infectious disease, at renal dosing  Okay to remove Medeiros catheter, check PVR after removed and as needed   BMP daily  We will continue to follow while admitted    Nutrition   Renal Diet/TF      Thank you. Please call with any questions. Rikki Nassar MD     [facility]. Patient seen with nurse practitioner. Known history of type 2 diabetes which is now diet controlled, hypertension, chronic back pain and chronic kidney disease stage IIIb-4 baseline 1.8-2.0 follows up with Dr. Jose Arias. Comes into the hospital brought in by his family members with lethargy and unresponsiveness. Initial suspicion was that he had overdosed on sleeping pills. Was found to be hypotensive in the ER hypoglycemic. Also found to be in acute kidney injury. Creatinine was 4.3. Also had anion gap and nongap metabolic acidosis from lactic acidosis. Work-up since then shows that he has E. coli bacteremia, E. coli UTI, also has obstructive jaundice, based on increased bilirubin, increased alkaline phosphatase. Renal function now appears to be improving with creatinine now down to 3.3, urine output has improved significantly. He is off pressors. Acidosis resolved, bicarbonate is now 26   IV fluids involve normal saline continue. Medeiros catheter in place which is draining well. Clinically appears to be doing well, awake follows commands, answers questions appropriately. Oral intake being advanced. Jaundice evident  No edema, heart sounds normal, systolic ejection murmur at the apex, lungs clear  Impression  1. Acute kidney injury secondary to ischemic ATN from shock, low flow, decreased intake Baseline creatinine 1.8-2.0 peaked up to 4.3 now down to 3.3, slow improvement.     2.  Anion gap and nongap metabolic acidosis from lactic acidosis resolved, salicylate level and Tylenol levels

## 2022-09-11 NOTE — PROGRESS NOTES
Physical Therapy  DATE: 2022    NAME: Bud Lay  MRN: [id number]   : 1935    Patient not seen this date for Physical Therapy due to:      [] Cancel by RN or physician due to:    [] Hemodialysis    [] Critical Lab Value Level     [] Blood transfusion in progress    [] Acute or unstable cardiovascular status   _MAP < 55 or more than >115  _HR < 40 or > 130    [] Acute or unstable pulmonary status   -FiO2 > 60%   _RR < 5 or >40    _O2 sats < 85%    [] Strict Bedrest    [] Off Unit for surgery or procedure    [] Off Unit for testing       [] Pending imaging to R/O fracture    [x] Refusal by Patient \"I just want to sleep. \"     [] Other      [] PT being discontinued at this time. Patient independent. No further needs. [] PT being discontinued at this time as the patient has been transferred to hospice care. No further needs.       Elizabeth Ash, PT

## 2022-09-11 NOTE — PROGRESS NOTES
Pulmonary Critical Care Progress Note    Patient seen for the follow up of Acute kidney injury superimposed on CKD ([address]     Subjective:    He was weaned off Levophed. He still NPO. He is on room air. He has adequate urine output on normal saline off bicarb drip. He denies pain. No syncope. MRCP delayed not available on weekend. Examination:    Vitals: /60   Pulse 81   Temp 96.8 °F (36 °C) (Temporal)   Resp 15   Ht 5' 9\" (1.753 m)   Wt 171 lb (77.6 kg)   SpO2 96%   BMI 25.25 kg/m²   SpO2  Av.8 %  Min: 92 %  Max: 99 %  General appearance: alert and cooperative with exam  Neck: No JVD  Lungs: decreased breath sounds no crackles  Heart: regular rate and rhythm, S1, S2 normal, no gallop  Abdomen: Soft, non tender, + BS  Extremities: no cyanosis or clubbing. No significant edema    LABs:    CBC:   Recent Labs     09/10/22  0343 22  0507   WBC 54.3* 31.8*   HGB 11.3* 11.3*   HCT 32.1* 31.4*   * 87*       BMP:   Recent Labs     09/10/22  1325 22  0507   * 129*   K 4.4 5.0   CO2 26 26   BUN 56* 56*   CREATININE 3.38* 3.35*   LABGLOM 17* 18*   GLUCOSE 89 60*       PT/INR:   Recent Labs     09/10/22  0343 22  0507   PROTIME 20.3* 19.7*   INR 1.7 1.7         LIVER PROFILE:  Recent Labs     09/10/22  0343 22  0507   AST 43* 42*   ALT 23 20   LABALBU 1.8* 1.7*         Radiology:    CXR   1. Right internal jugular line terminates at the superior caval atrial   junction. 2.  Shallow inflation with more prominent perihilar opacities. Similar   appearance of peribronchial wall thickening. While this in part may   represent atelectasis, developing edema should be considered.            Impression:  Sepsis/septic shock /cholangitis   Acute renal failure /metabolic acidosis   hyponatremia   Change in mental status   Levophed subcutaneous infiltration    Recurrent hypoglycemia due to sepsis   Colitis on CT   diabetes    Recommendations:   Oxygen by nasal cannula Incentive spirometer every hour awake   DuoNeb p.r.n. Diet if okay with GI   Rocephin status post Vanco /ID consult   GI consult /MRCP/ monitor liver function test and coags  Monitor blood pressure off Levophed to map more than 65    /hr/ off Bicarb drip /monitor urine output creatinine and sodium   Monitor troponins   Monitor blood sugars   Peripheral blood  smear.    Nephrology on consult    Discussed with RN   Palliative care consult /code status  Bedrest   Peptic ulcer disease prophylaxis   DVT prophylaxis    Kimmy Ellis MD, MD, [facility]  Pulmonary Critical Care and Sleep Medicine,  [facility]  Cell: [phone]  Office: [phone]

## 2022-09-11 NOTE — PROGRESS NOTES
RN spoke with Dr. Ada Sierra, Nephrology and due to pt Acute Kidney injury and hyponatremia he want blanca catheter left in at least one more day and reassess on Monday with Nephrology.

## 2022-09-11 NOTE — PLAN OF CARE
Problem: Pain  Goal: Verbalizes/displays adequate comfort level or baseline comfort level  Outcome: Progressing  Flowsheets  Taken 9/11/2022 0125  Verbalizes/displays adequate comfort level or baseline comfort level:   Encourage patient to monitor pain and request assistance   Assess pain using appropriate pain scale  Taken 9/10/2022 2000  Verbalizes/displays adequate comfort level or baseline comfort level:   Encourage patient to monitor pain and request assistance   Assess pain using appropriate pain scale     Problem: Safety - Adult  Goal: Free from fall injury  Outcome: Progressing  Note: Patient is free from falls; educated on safety and fall prevention     Problem: Neurosensory - Adult  Goal: Achieves stable or improved neurological status  Outcome: Progressing  Flowsheets (Taken 9/11/2022 0125)  Achieves stable or improved neurological status:   Assess for and report changes in neurological status   Initiate measures to prevent increased intracranial pressure   Maintain blood pressure and fluid volume within ordered parameters to optimize cerebral perfusion and minimize risk of hemorrhage   Monitor temperature, glucose, and sodium.  Initiate appropriate interventions as ordered  Note: Patient is A&O to self, time, and place

## 2022-09-11 NOTE — PLAN OF CARE
Problem: Discharge Planning  Goal: Discharge to home or other facility with appropriate resources  Outcome: Progressing  Flowsheets  Taken 9/11/2022 1400  Discharge to home or other facility with appropriate resources:   Identify barriers to discharge with patient and caregiver   Arrange for needed discharge resources and transportation as appropriate   Identify discharge learning needs (meds, wound care, etc)   Refer to discharge planning if patient needs post-hospital services based on physician order or complex needs related to functional status, cognitive ability or social support system  Taken 9/11/2022 0830  Discharge to home or other facility with appropriate resources:   Identify barriers to discharge with patient and caregiver   Arrange for needed discharge resources and transportation as appropriate   Identify discharge learning needs (meds, wound care, etc)   Refer to discharge planning if patient needs post-hospital services based on physician order or complex needs related to functional status, cognitive ability or social support system     Problem: Pain  Goal: Verbalizes/displays adequate comfort level or baseline comfort level  9/11/2022 1507 by Milo Williamson RN  Outcome: Progressing  Flowsheets  Taken 9/11/2022 1400  Verbalizes/displays adequate comfort level or baseline comfort level:   Encourage patient to monitor pain and request assistance   Assess pain using appropriate pain scale   Administer analgesics based on type and severity of pain and evaluate response   Implement non-pharmacological measures as appropriate and evaluate response   Notify Licensed Independent Practitioner if interventions unsuccessful or patient reports new pain  Taken 9/11/2022 0830  Verbalizes/displays adequate comfort level or baseline comfort level:   Encourage patient to monitor pain and request assistance   Administer analgesics based on type and severity of pain and evaluate response   Assess pain using appropriate pain scale   Implement non-pharmacological measures as appropriate and evaluate response   Notify Licensed Independent Practitioner if interventions unsuccessful or patient reports new pain  Taken 9/11/2022 0815  Verbalizes/displays adequate comfort level or baseline comfort level:   Encourage patient to monitor pain and request assistance   Assess pain using appropriate pain scale   Administer analgesics based on type and severity of pain and evaluate response   Implement non-pharmacological measures as appropriate and evaluate response   Notify Licensed Independent Practitioner if interventions unsuccessful or patient reports new pain  9/11/2022 0125 by Michaelle Smith RN  Outcome: Progressing  Flowsheets  Taken 9/11/2022 0125  Verbalizes/displays adequate comfort level or baseline comfort level:   Encourage patient to monitor pain and request assistance   Assess pain using appropriate pain scale  Taken 9/10/2022 2000  Verbalizes/displays adequate comfort level or baseline comfort level:   Encourage patient to monitor pain and request assistance   Assess pain using appropriate pain scale     Problem: Safety - Adult  Goal: Free from fall injury  9/11/2022 1507 by Radha Lagunas RN  Outcome: Progressing  Flowsheets (Taken 9/11/2022 0800)  Free From Fall Injury: Instruct family/caregiver on patient safety  9/11/2022 0125 by Michaelle Smith RN  Outcome: Progressing  Note: Patient is free from falls; educated on safety and fall prevention     Problem: Chronic Conditions and Co-morbidities  Goal: Patient's chronic conditions and co-morbidity symptoms are monitored and maintained or improved  Outcome: Progressing  Flowsheets  Taken 9/11/2022 1400  Care Plan - Patient's Chronic Conditions and Co-Morbidity Symptoms are Monitored and Maintained or Improved:   Monitor and assess patient's chronic conditions and comorbid symptoms for stability, deterioration, or improvement   Collaborate with multidisciplinary team to address chronic and comorbid conditions and prevent exacerbation or deterioration   Update acute care plan with appropriate goals if chronic or comorbid symptoms are exacerbated and prevent overall improvement and discharge  Taken 9/11/2022 0830  Care Plan - Patient's Chronic Conditions and Co-Morbidity Symptoms are Monitored and Maintained or Improved:   Monitor and assess patient's chronic conditions and comorbid symptoms for stability, deterioration, or improvement   Collaborate with multidisciplinary team to address chronic and comorbid conditions and prevent exacerbation or deterioration   Update acute care plan with appropriate goals if chronic or comorbid symptoms are exacerbated and prevent overall improvement and discharge     Problem: Neurosensory - Adult  Goal: Achieves stable or improved neurological status  9/11/2022 1507 by Guillermina Kim RN  Outcome: Progressing  Flowsheets  Taken 9/11/2022 1400  Achieves stable or improved neurological status:   Assess for and report changes in neurological status   Initiate measures to prevent increased intracranial pressure   Maintain blood pressure and fluid volume within ordered parameters to optimize cerebral perfusion and minimize risk of hemorrhage   Monitor temperature, glucose, and sodium. Initiate appropriate interventions as ordered  Taken 9/11/2022 0830  Achieves stable or improved neurological status:   Assess for and report changes in neurological status   Initiate measures to prevent increased intracranial pressure  9/11/2022 0125 by Ed Henley RN  Outcome: Progressing  Flowsheets (Taken 9/11/2022 0125)  Achieves stable or improved neurological status:   Assess for and report changes in neurological status   Initiate measures to prevent increased intracranial pressure   Maintain blood pressure and fluid volume within ordered parameters to optimize cerebral perfusion and minimize risk of hemorrhage   Monitor temperature, glucose, and sodium.  Initiate appropriate interventions as ordered  Note: Patient is A&O to self, time, and place     Problem: Respiratory - Adult  Goal: Achieves optimal ventilation and oxygenation  Outcome: Progressing  Flowsheets  Taken 9/11/2022 1400  Achieves optimal ventilation and oxygenation:   Assess for changes in respiratory status   Assess for changes in mentation and behavior   Position to facilitate oxygenation and minimize respiratory effort   Oxygen supplementation based on oxygen saturation or arterial blood gases   Encourage broncho-pulmonary hygiene including cough, deep breathe, incentive spirometry   Assess the need for suctioning and aspirate as needed   Assess and instruct to report shortness of breath or any respiratory difficulty   Respiratory therapy support as indicated  Taken 9/11/2022 0830  Achieves optimal ventilation and oxygenation:   Assess for changes in respiratory status   Assess for changes in mentation and behavior   Position to facilitate oxygenation and minimize respiratory effort   Oxygen supplementation based on oxygen saturation or arterial blood gases   Encourage broncho-pulmonary hygiene including cough, deep breathe, incentive spirometry   Assess and instruct to report shortness of breath or any respiratory difficulty   Respiratory therapy support as indicated     Problem: Cardiovascular - Adult  Goal: Absence of cardiac dysrhythmias or at baseline  Outcome: Progressing  Flowsheets  Taken 9/11/2022 1400  Absence of cardiac dysrhythmias or at baseline:   Monitor cardiac rate and rhythm   Assess for signs of decreased cardiac output   Administer antiarrhythmia medication and electrolyte replacement as ordered  Taken 9/11/2022 0830  Absence of cardiac dysrhythmias or at baseline:   Monitor cardiac rate and rhythm   Assess for signs of decreased cardiac output   Administer antiarrhythmia medication and electrolyte replacement as ordered     Problem: Skin/Tissue Integrity - Adult  Goal: Skin integrity remains intact  Outcome: Progressing  Flowsheets  Taken 9/11/2022 1400  Skin Integrity Remains Intact:   Monitor for areas of redness and/or skin breakdown   Assess vascular access sites hourly  Taken 9/11/2022 0830  Skin Integrity Remains Intact:   Monitor for areas of redness and/or skin breakdown   Assess vascular access sites hourly  Taken 9/11/2022 0800  Skin Integrity Remains Intact:   Monitor for areas of redness and/or skin breakdown   Assess vascular access sites hourly  Goal: Oral mucous membranes remain intact  Outcome: Progressing  Flowsheets  Taken 9/11/2022 1400  Oral Mucous Membranes Remain Intact:   Assess oral mucosa and hygiene practices   Implement preventative oral hygiene regimen   Implement oral medicated treatments as ordered  Taken 9/11/2022 0830  Oral Mucous Membranes Remain Intact:   Assess oral mucosa and hygiene practices   Implement preventative oral hygiene regimen   Implement oral medicated treatments as ordered  Taken 9/11/2022 0800  Oral Mucous Membranes Remain Intact:   Assess oral mucosa and hygiene practices   Implement preventative oral hygiene regimen   Implement oral medicated treatments as ordered     Problem: Musculoskeletal - Adult  Goal: Return mobility to safest level of function  Outcome: Progressing  Flowsheets  Taken 9/11/2022 1400  Return Mobility to Safest Level of Function:   Assess patient stability and activity tolerance for standing, transferring and ambulating with or without assistive devices   Assist with transfers and ambulation using safe patient handling equipment as needed   Ensure adequate protection for wounds/incisions during mobilization   Obtain physical therapy/occupational therapy consults as needed   Instruct patient/family in ordered activity level  Taken 9/11/2022 0830  Return Mobility to Safest Level of Function:   Assess patient stability and activity tolerance for standing, transferring and ambulating with or without assistive devices   Assist with transfers and ambulation using safe patient handling equipment as needed   Ensure adequate protection for wounds/incisions during mobilization   Obtain physical therapy/occupational therapy consults as needed   Instruct patient/family in ordered activity level  Goal: Maintain proper alignment of affected body part  Outcome: Progressing  Flowsheets  Taken 9/11/2022 1400  Maintain proper alignment of affected body part:   Support and protect limb and body alignment per provider's orders   Instruct and reinforce with patient and family use of appropriate assistive device and precautions (e.g. spinal or hip dislocation precautions)  Taken 9/11/2022 0830  Maintain proper alignment of affected body part:   Support and protect limb and body alignment per provider's orders   Instruct and reinforce with patient and family use of appropriate assistive device and precautions (e.g. spinal or hip dislocation precautions)  Goal: Return ADL status to a safe level of function  Outcome: Progressing  Flowsheets  Taken 9/11/2022 1400  Return ADL Status to a Safe Level of Function:   Administer medication as ordered   Assess activities of daily living deficits and provide assistive devices as needed   Obtain physical therapy/occupational therapy consults as needed   Assist and instruct patient to increase activity and self care as tolerated  Taken 9/11/2022 0830  Return ADL Status to a Safe Level of Function:   Administer medication as ordered   Assess activities of daily living deficits and provide assistive devices as needed   Obtain physical therapy/occupational therapy consults as needed   Assist and instruct patient to increase activity and self care as tolerated     Problem: Gastrointestinal - Adult  Goal: Minimal or absence of nausea and vomiting  Outcome: Progressing  Flowsheets  Taken 9/11/2022 1400  Minimal or absence of nausea and vomiting:   Administer IV fluids as ordered to ensure adequate hydration   Maintain NPO status until nausea and vomiting are resolved   Administer ordered antiemetic medications as needed   Provide nonpharmacologic comfort measures as appropriate   Advance diet as tolerated, if ordered   Nutrition consult to assist patient with adequate nutrition and appropriate food choices  Taken 9/11/2022 0830  Minimal or absence of nausea and vomiting:   Administer IV fluids as ordered to ensure adequate hydration   Maintain NPO status until nausea and vomiting are resolved   Administer ordered antiemetic medications as needed   Provide nonpharmacologic comfort measures as appropriate   Advance diet as tolerated, if ordered   Nutrition consult to assist patient with adequate nutrition and appropriate food choices  Goal: Maintains or returns to baseline bowel function  Outcome: Progressing  Flowsheets  Taken 9/11/2022 1400  Maintains or returns to baseline bowel function:   Assess bowel function   Encourage oral fluids to ensure adequate hydration   Administer IV fluids as ordered to ensure adequate hydration   Administer ordered medications as needed   Nutrition consult to assist patient with appropriate food choices   Encourage mobilization and activity  Taken 9/11/2022 0830  Maintains or returns to baseline bowel function:   Assess bowel function   Encourage oral fluids to ensure adequate hydration   Administer IV fluids as ordered to ensure adequate hydration   Administer ordered medications as needed   Encourage mobilization and activity   Nutrition consult to assist patient with appropriate food choices  Goal: Maintains adequate nutritional intake  Outcome: Progressing  Flowsheets  Taken 9/11/2022 1400  Maintains adequate nutritional intake:   Monitor percentage of each meal consumed   Identify factors contributing to decreased intake, treat as appropriate   Assist with meals as needed   Monitor intake and output, weight and lab values   Obtain nutritional consult as needed  Taken 9/11/2022 0830  Maintains adequate nutritional intake:   Monitor percentage of each meal consumed   Identify factors contributing to decreased intake, treat as appropriate   Assist with meals as needed   Monitor intake and output, weight and lab values   Obtain nutritional consult as needed  Goal: Establish and maintain optimal ostomy function  Outcome: Progressing  Flowsheets  Taken 9/11/2022 1400  Establish and maintain optimal ostomy function:   Monitor output from ostomies   Administer IV fluids and TPN as ordered   Introduce and advance enteral feedings as ordered   Nutrition consult   Infuse IV Fluids/TPN as ordered  Taken 9/11/2022 0830  Establish and maintain optimal ostomy function:   Monitor output from ostomies   Administer IV fluids and TPN as ordered   Nutrition consult   Infuse IV Fluids/TPN as ordered     Problem: Genitourinary - Adult  Goal: Absence of urinary retention  Outcome: Progressing  Flowsheets  Taken 9/11/2022 1400  Absence of urinary retention:   Assess patients ability to void and empty bladder   Monitor intake/output and perform bladder scan as needed   Place urinary catheter per Licensed Independent Practitioner order if needed   Discuss with Licensed Independent Practitioner  medications to alleviate retention as needed   Discuss catheterization for long term situations as appropriate  Taken 9/11/2022 0830  Absence of urinary retention:   Assess patients ability to void and empty bladder   Monitor intake/output and perform bladder scan as needed   Place urinary catheter per Licensed Independent Practitioner order if needed   Discuss catheterization for long term situations as appropriate   Discuss with Licensed Independent Practitioner  medications to alleviate retention as needed  Goal: Urinary catheter remains patent  Outcome: Progressing  Flowsheets  Taken 9/11/2022 1400  Urinary catheter remains patent:   Assess patency of urinary catheter   Irrigate catheter per Licensed Independent Practitioner order if indicated and notify Licensed Independent Practitioner if unable to irrigate   Assess need for a larger catheter size or a 3-way catheter for continuous bladder irrigation  Taken 9/11/2022 0830  Urinary catheter remains patent:   Assess patency of urinary catheter   Irrigate catheter per Licensed Independent Practitioner order if indicated and notify Licensed Independent Practitioner if unable to irrigate   Assess need for a larger catheter size or a 3-way catheter for continuous bladder irrigation     Problem: Infection - Adult  Goal: Absence of infection at discharge  Outcome: Progressing  Flowsheets  Taken 9/11/2022 1400  Absence of infection at discharge:   Assess and monitor for signs and symptoms of infection   Monitor lab/diagnostic results   Monitor all insertion sites i.e., indwelling lines, tubes and drains   Monitor endotracheal (as able) and nasal secretions for changes in amount and color   Administer medications as ordered   Instruct and encourage patient and family to use good hand hygiene technique   Identify and instruct in appropriate isolation precautions for identified infection/condition  Taken 9/11/2022 0830  Absence of infection at discharge:   Assess and monitor for signs and symptoms of infection   Monitor all insertion sites i.e., indwelling lines, tubes and drains   Monitor lab/diagnostic results   Administer medications as ordered   Instruct and encourage patient and family to use good hand hygiene technique   Identify and instruct in appropriate isolation precautions for identified infection/condition  Goal: Absence of infection during hospitalization  Outcome: Progressing  Flowsheets  Taken 9/11/2022 1400  Absence of infection during hospitalization:   Assess and monitor for signs and symptoms of infection   Monitor lab/diagnostic results   Monitor all insertion sites i.e., indwelling lines, tubes and drains   Monitor endotracheal (as able) and nasal secretions for changes in amount and color   Administer medications as ordered   Instruct and encourage patient and family to use good hand hygiene technique   Identify and instruct in appropriate isolation precautions for identified infection/condition  Taken 9/11/2022 0830  Absence of infection during hospitalization:   Assess and monitor for signs and symptoms of infection   Monitor lab/diagnostic results   Monitor all insertion sites i.e., indwelling lines, tubes and drains   Administer medications as ordered   Instruct and encourage patient and family to use good hand hygiene technique   Identify and instruct in appropriate isolation precautions for identified infection/condition  Goal: Absence of fever/infection during anticipated neutropenic period  Outcome: Progressing  Flowsheets  Taken 9/11/2022 1400  Absence of fever/infection during anticipated neutropenic period:   Monitor white blood cell count   Administer growth factors as ordered   Implement neutropenic guidelines  Taken 9/11/2022 0830  Absence of fever/infection during anticipated neutropenic period:   Monitor white blood cell count   Administer growth factors as ordered   Implement neutropenic guidelines     Problem: Metabolic/Fluid and Electrolytes - Adult  Goal: Electrolytes maintained within normal limits  Outcome: Progressing  Flowsheets  Taken 9/11/2022 1400  Electrolytes maintained within normal limits:   Monitor labs and assess patient for signs and symptoms of electrolyte imbalances   Administer electrolyte replacement as ordered   Monitor response to electrolyte replacements, including repeat lab results as appropriate   Fluid restriction as ordered   Instruct patient on fluid and nutrition restrictions as appropriate  Taken 9/11/2022 0830  Electrolytes maintained within normal limits:   Monitor labs and assess patient for signs and symptoms of electrolyte imbalances   Administer electrolyte replacement as ordered   Monitor response to electrolyte replacements, including repeat lab results as appropriate   Instruct patient on fluid and nutrition restrictions as appropriate  Goal: Hemodynamic stability and optimal renal function maintained  Outcome: Progressing  Flowsheets  Taken 9/11/2022 1400  Hemodynamic stability and optimal renal function maintained:   Monitor labs and assess for signs and symptoms of volume excess or deficit   Monitor intake, output and patient weight   Monitor urine specific gravity, serum osmolarity and serum sodium as indicated or ordered   Monitor response to interventions for patient's volume status, including labs, urine output, blood pressure (other measures as available)   Encourage oral intake as appropriate   Instruct patient on fluid and nutrition restrictions as appropriate  Taken 9/11/2022 0830  Hemodynamic stability and optimal renal function maintained:   Monitor labs and assess for signs and symptoms of volume excess or deficit   Monitor intake, output and patient weight   Monitor urine specific gravity, serum osmolarity and serum sodium as indicated or ordered   Monitor response to interventions for patient's volume status, including labs, urine output, blood pressure (other measures as available)   Encourage oral intake as appropriate   Instruct patient on fluid and nutrition restrictions as appropriate  Goal: Glucose maintained within prescribed range  Outcome: Progressing  Flowsheets  Taken 9/11/2022 1400  Glucose maintained within prescribed range:   Monitor blood glucose as ordered   Assess for signs and symptoms of hyperglycemia and hypoglycemia   Administer ordered medications to maintain glucose within target range   Assess barriers to adequate nutritional intake and initiate nutrition consult as needed   Instruct patient on self management of diabetes and initiate consult as needed  Taken 9/11/2022 0830  Glucose maintained within prescribed range:   Monitor blood glucose as ordered   Assess for signs and symptoms of hyperglycemia and hypoglycemia   Administer ordered medications to maintain glucose within target range   Instruct patient on self management of diabetes and initiate consult as needed   Assess barriers to adequate nutritional intake and initiate nutrition consult as needed     Problem: Hematologic - Adult  Goal: Maintains hematologic stability  Outcome: Progressing  Flowsheets  Taken 9/11/2022 1400  Maintains hematologic stability:   Assess for signs and symptoms of bleeding or hemorrhage   Administer blood products/factors as ordered   Monitor labs for bleeding or clotting disorders  Taken 9/11/2022 0830  Maintains hematologic stability:   Assess for signs and symptoms of bleeding or hemorrhage   Monitor labs for bleeding or clotting disorders   Administer blood products/factors as ordered

## 2022-09-11 NOTE — PROGRESS NOTES
End Of Shift Note  [facility]  Summary of shift: Patient had a mostly uneventful night. He is A&O to self, time, and place, but still disoriented to situation. Urine output overnight 425mL. His AM labs showed BG 60; POCT BG 63 - patient is asymptomatic. 4oz orange juice given. Writer will recheck BG in 15 mins.     Vitals:    Vitals:    09/11/22 0300 09/11/22 0400 09/11/22 0500 09/11/22 0600   BP: 107/61 109/67 (!) 97/59 (!) 103/58   Pulse: 81 83 85 88   Resp: 17 13 13 20   Temp:  96.8 °F (36 °C)     TempSrc:  Temporal     SpO2: 99% 97% 97% 97%   Weight:       Height:            I&O:   Intake/Output Summary (Last 24 hours) at 9/11/2022 0617  Last data filed at 9/11/2022 0400  Gross per 24 hour   Intake 1520.91 ml   Output 1075 ml   Net 445.91 ml       Resp Status: RA    Ventilator Settings:     / / /     Critical Care IV infusions:   sodium chloride 100 mL/hr at 09/11/22 0303    norepinephrine Stopped (09/10/22 1312)    dextrose      sodium chloride 20 mL/hr at 09/11/22 0310        LDA:   CVC Triple Lumen 09/09/22 Right Internal jugular (Active)   Number of days: 1       Peripheral IV 09/09/22 Distal;Left Forearm (Active)   Number of days: 1       Urinary Catheter 09/08/22 Coude (Active)   Number of days: 2

## 2022-09-11 NOTE — PROGRESS NOTES
clear to auscultation bilaterally- no wheezes, rales or rhonchi, normal air movement, no respiratory distress  Cardiovascular: normal rate, regular rhythm, normal S1 and S2, no murmurs, rubs, clicks or gallops, distal pulses intact, no carotid bruits  Abdomen: soft, non-tender, non-distended, normal bowel sounds, no masses or organomegaly  Extremities: no cyanosis, clubbing or edema  Musculoskeletal: normal range of motion, no joint swelling, deformity or tenderness  Neurologic: no cranial nerve deficit and muscle strength normal    Lab and Imaging Review     CBC  Recent Labs     09/09/22  1432 09/10/22  0343 09/11/22  0507   WBC 49.3* 54.3* 31.8*   HGB 10.6* 11.3* 11.3*   HCT 31.1* 32.1* 31.4*   MCV 90.1 85.8 84.9   * 105* 87*       BMP  Recent Labs     09/10/22  0343 09/10/22  1325 09/11/22  0507   * 126* 129*   K 4.3 4.4 5.0   CL 91* 90* 94*   CO2 26 26 26   BUN 61* 56* 56*   CREATININE 3.82* 3.38* 3.35*   GLUCOSE 97 89 60*   CALCIUM 7.8* 7.7* 7.8*       LFTS  Recent Labs     09/09/22  0635 09/10/22  0343 09/11/22  0507   ALKPHOS 153* 171* 199*   ALT 20 23 20   AST 39 43* 42*   PROT 5.1* 5.7* 5.5*   BILITOT 3.9* 4.0* 3.7*   LABALBU 1.6* 1.8* 1.7*       AMYLASE/LIPASE/AMMONIA  Recent Labs     09/08/22  1305 09/09/22  1432   AMMONIA 60 31       PT/INR  Recent Labs     09/09/22  0635 09/10/22  0343 09/11/22  0507   PROTIME 21.7* 20.3* 19.7*   INR 1.9 1.7 1.7       FINDINGS:ct abd 9/8/22       Chest:       Suboptimal evaluation secondary to motion degradation. Chest Wall and Thoracic Inlet: No axillary or supraclavicular   lymphadenopathy. The thyroid gland appears within normal limits. Mediastinum and Tali: Hypodense blood pool. No mediastinal or hilar   lymphadenopathy. Normal caliber of the thoracic aorta. The heart is normal   in size. No pericardial effusion. Lungs: Mild bilateral lower lobe patchy opacities are favored to represent   atelectasis.   However, an early inflammatory/infectious process is not   excluded in the proper clinical setting. Pleura: No pleural effusion. No pneumothorax. Bones: No suspicious osseous findings. Ossification of the anterior   longitudinal ligament may be related to diffuse idiopathic skeletal   hyperostosis (DISH). Abdomen/Pelvis:       Organs: There is no acute abnormality of the liver, pancreas, spleen,   adrenals, or kidneys. Suspected cholelithiasis. Suboptimal evaluation of   the gallbladder fossa. Bilateral perinephric fat stranding present. GI/Bowel: Postsurgical changes compatible with gastric bypass. Bowel caliber   is normal.  Diffuse wall thickening of the ascending and transverse colon may   represent colitis. Questionable rectal wall thickening. There is no   evidence of acute appendicitis. Pelvis: Mild bladder wall thickening. Peritoneum/Retroperitoneum: There is no free air. Small amount of free fluid   present around the liver and spleen. No pathologically enlarged lymph nodes   identified. Scattered atherosclerotic calcifications. Bones/Soft Tissues: No suspicious osseous findings. Diffuse subcutaneous fat   stranding may related to fluid overload. Small fat containing umbilical   hernia. Multilevel lumbar spondylosis. Impression   1. Diffuse wall thickening of the ascending and transverse colon may   represent colitis. 2. Questionable rectal wall thickening may represent proctitis. 3. Suspected cholelithiasis. Suboptimal evaluation of gallbladder fossa. Can correlate with right upper quadrant ultrasound. 4. Mild bladder wall thickening may be related to underdistension versus   possible cystitis. Can correlate with urinalysis. 5. Small amount of free fluid present around the liver and spleen. 6. Mild bilateral lower lobe patchy opacities are favored to represent   atelectasis.  However, an early inflammatory/infectious process is not   excluded in the proper clinical setting. 7. Diffuse subcutaneous fat stranding may related to fluid overload. 8. Hypodense blood pool, may be related to the anemia. ASSESSMENT/plan  Abdominal pain with colitis per imaging, ecoli sepsis, UTI elevated lft's., hypotension   [facility]p tomorrow to evaluate for cholangitis  Continue abx and trend lft  Eventual colonoscopy when more stable to eval rectal wall thickening seen on ct    2. AMS improved s/p accidental overdose on melatonin      This plan was formulated in collaboration with Dr. Cielo Price .     Electronically signed by: DIONICIO Hamilton - CNP on 9/11/2022 at 10:03 AM

## 2022-09-12 ENCOUNTER — APPOINTMENT (OUTPATIENT)
Dept: MRI IMAGING | Age: 87
DRG: 871 | End: 2022-09-12
Payer: MEDICARE

## 2022-09-12 LAB
ALBUMIN SERPL-MCNC: 1.5 G/DL (ref 3.5–5.2)
ALP BLD-CCNC: 237 U/L (ref 40–129)
ALT SERPL-CCNC: 23 U/L (ref 5–41)
ANION GAP SERPL CALCULATED.3IONS-SCNC: 9 MMOL/L (ref 9–17)
AST SERPL-CCNC: 49 U/L
BILIRUB SERPL-MCNC: 3.1 MG/DL (ref 0.3–1.2)
BILIRUBIN DIRECT: 2.7 MG/DL
BILIRUBIN, INDIRECT: 0.4 MG/DL (ref 0–1)
BUN BLDV-MCNC: 52 MG/DL (ref 8–23)
BUN/CREAT BLD: 16 (ref 9–20)
CALCIUM SERPL-MCNC: 7.6 MG/DL (ref 8.6–10.4)
CHLORIDE BLD-SCNC: 99 MMOL/L (ref 98–107)
CO2: 22 MMOL/L (ref 20–31)
CREAT SERPL-MCNC: 3.17 MG/DL (ref 0.7–1.2)
GFR AFRICAN AMERICAN: 23 ML/MIN
GFR NON-AFRICAN AMERICAN: 19 ML/MIN
GFR SERPL CREATININE-BSD FRML MDRD: ABNORMAL ML/MIN/{1.73_M2}
GLUCOSE BLD-MCNC: 67 MG/DL (ref 75–110)
GLUCOSE BLD-MCNC: 85 MG/DL (ref 70–99)
GLUCOSE BLD-MCNC: 86 MG/DL (ref 75–110)
GLUCOSE BLD-MCNC: 88 MG/DL (ref 75–110)
GLUCOSE BLD-MCNC: 89 MG/DL (ref 75–110)
GLUCOSE BLD-MCNC: 89 MG/DL (ref 75–110)
GLUCOSE BLD-MCNC: 94 MG/DL (ref 75–110)
GLUCOSE BLD-MCNC: 96 MG/DL (ref 75–110)
GLUCOSE BLD-MCNC: 97 MG/DL (ref 75–110)
INR BLD: 1.6
POTASSIUM SERPL-SCNC: 4.8 MMOL/L (ref 3.7–5.3)
PROTHROMBIN TIME: 18.6 SEC (ref 11.5–14.2)
SODIUM BLD-SCNC: 130 MMOL/L (ref 135–144)
SURGICAL PATHOLOGY REPORT: NORMAL
TOTAL PROTEIN: 5.5 G/DL (ref 6.4–8.3)

## 2022-09-12 PROCEDURE — 74181 MRI ABDOMEN W/O CONTRAST: CPT

## 2022-09-12 PROCEDURE — 2000000000 HC ICU R&B

## 2022-09-12 PROCEDURE — 2580000003 HC RX 258: Performed by: INTERNAL MEDICINE

## 2022-09-12 PROCEDURE — 6360000002 HC RX W HCPCS: Performed by: NURSE PRACTITIONER

## 2022-09-12 PROCEDURE — 97162 PT EVAL MOD COMPLEX 30 MIN: CPT

## 2022-09-12 PROCEDURE — 2580000003 HC RX 258: Performed by: NURSE PRACTITIONER

## 2022-09-12 PROCEDURE — APPSS30 APP SPLIT SHARED TIME 16-30 MINUTES: Performed by: NURSE PRACTITIONER

## 2022-09-12 PROCEDURE — 85610 PROTHROMBIN TIME: CPT

## 2022-09-12 PROCEDURE — 97530 THERAPEUTIC ACTIVITIES: CPT

## 2022-09-12 PROCEDURE — 80048 BASIC METABOLIC PNL TOTAL CA: CPT

## 2022-09-12 PROCEDURE — 80076 HEPATIC FUNCTION PANEL: CPT

## 2022-09-12 PROCEDURE — 97167 OT EVAL HIGH COMPLEX 60 MIN: CPT

## 2022-09-12 PROCEDURE — 99232 SBSQ HOSP IP/OBS MODERATE 35: CPT | Performed by: INTERNAL MEDICINE

## 2022-09-12 PROCEDURE — 97535 SELF CARE MNGMENT TRAINING: CPT

## 2022-09-12 PROCEDURE — 87040 BLOOD CULTURE FOR BACTERIA: CPT

## 2022-09-12 PROCEDURE — 97112 NEUROMUSCULAR REEDUCATION: CPT

## 2022-09-12 PROCEDURE — 6360000002 HC RX W HCPCS: Performed by: INTERNAL MEDICINE

## 2022-09-12 PROCEDURE — 94761 N-INVAS EAR/PLS OXIMETRY MLT: CPT

## 2022-09-12 PROCEDURE — C9113 INJ PANTOPRAZOLE SODIUM, VIA: HCPCS | Performed by: INTERNAL MEDICINE

## 2022-09-12 PROCEDURE — A4216 STERILE WATER/SALINE, 10 ML: HCPCS | Performed by: INTERNAL MEDICINE

## 2022-09-12 PROCEDURE — 36415 COLL VENOUS BLD VENIPUNCTURE: CPT

## 2022-09-12 PROCEDURE — 82947 ASSAY GLUCOSE BLOOD QUANT: CPT

## 2022-09-12 PROCEDURE — 6370000000 HC RX 637 (ALT 250 FOR IP): Performed by: INTERNAL MEDICINE

## 2022-09-12 RX ORDER — PANTOPRAZOLE SODIUM 40 MG/1
40 TABLET, DELAYED RELEASE ORAL
Status: DISCONTINUED | OUTPATIENT
Start: 2022-09-13 | End: 2022-09-15 | Stop reason: HOSPADM

## 2022-09-12 RX ADMIN — SODIUM CHLORIDE: 9 INJECTION, SOLUTION INTRAVENOUS at 06:29

## 2022-09-12 RX ADMIN — CEFTRIAXONE SODIUM 1000 MG: 1 INJECTION, POWDER, FOR SOLUTION INTRAMUSCULAR; INTRAVENOUS at 12:26

## 2022-09-12 RX ADMIN — SODIUM CHLORIDE, PRESERVATIVE FREE 10 ML: 5 INJECTION INTRAVENOUS at 20:26

## 2022-09-12 RX ADMIN — SODIUM CHLORIDE 40 MG: 9 INJECTION, SOLUTION INTRAMUSCULAR; INTRAVENOUS; SUBCUTANEOUS at 08:59

## 2022-09-12 RX ADMIN — MIDODRINE HYDROCHLORIDE 10 MG: 10 TABLET ORAL at 08:59

## 2022-09-12 RX ADMIN — SODIUM CHLORIDE, PRESERVATIVE FREE 10 ML: 5 INJECTION INTRAVENOUS at 09:00

## 2022-09-12 RX ADMIN — MIDODRINE HYDROCHLORIDE 10 MG: 10 TABLET ORAL at 12:23

## 2022-09-12 ASSESSMENT — ENCOUNTER SYMPTOMS
RHINORRHEA: 0
CHOKING: 0
RESPIRATORY NEGATIVE: 1
GASTROINTESTINAL NEGATIVE: 1
ABDOMINAL PAIN: 0
SHORTNESS OF BREATH: 0
WHEEZING: 0
COUGH: 0
BACK PAIN: 1
CHEST TIGHTNESS: 0
VOMITING: 0
DIARRHEA: 0
SINUS PRESSURE: 0
CONSTIPATION: 0
NAUSEA: 0
VOICE CHANGE: 0

## 2022-09-12 ASSESSMENT — PAIN SCALES - GENERAL
PAINLEVEL_OUTOF10: 0
PAINLEVEL_OUTOF10: 0

## 2022-09-12 NOTE — PROGRESS NOTES
were also pertinent to this visit. Past Medical History:  has a past medical history of Chronic back pain, Cirrhosis of liver without ascites ([facility] [address] and Diabetes mellitus ([facility] [address] Past Surgical History:  has a past surgical history that includes Bariatric Surgery. Assessment   Body Structures, Functions, Activity Limitations Requiring Skilled Therapeutic Intervention: Decreased functional mobility ; Decreased ADL status; Decreased balance;Decreased endurance;Decreased cognition;Decreased safe awareness;Decreased strength;Decreased high-level IADLs; Increased pain;Decreased posture  Assessment: Pt tolerated PT eval poor. Pt currently presenting w/ significant strength, balance, and endurance deficits. At this time, pt is functioning below baseline and requires skilled 2 person assist w/ use of sarah stedy for safe functional mobility. Pt is a HIGH fall risk given deficits, hx of frequent falls, and decreased safety awareness. Pt would benefit from continued skilled PT to address deficits in order to maximize independence w/ functional mobility and return to PLOF as able. Therapy Prognosis: Good  Decision Making: Medium Complexity  Requires PT Follow-Up: Yes  Activity Tolerance  Activity Tolerance: Patient limited by endurance; Patient limited by fatigue  Activity Tolerance Comments: Pt w/ poor endurance noted throughout session. Also noted pt's SpO2 dropped to 88% briefly while standing w/ HR 121bpm.  Upon returning supine, pt's HR decreased back to 90s w/ SpO2 91%.      Plan   Plan  Plan: 5-7 times per week  Current Treatment Recommendations: Strengthening, Functional mobility training, Balance training, Transfer training, Neuromuscular re-education, Gait training, Endurance training, Pain management, Home exercise program, Safety education & training, Patient/Caregiver education & training, Equipment evaluation, education, & procurement, Therapeutic activities, Positioning, ADL/Self-care training  Safety Devices  Type of Devices: Bed alarm in place, Call light within reach, Left in bed, Gait belt, Nurse notified, Karolina Aguiar elevated for pressure relief  Restraints  Restraints Initially in Place: No     Restrictions  Restrictions/Precautions  Restrictions/Precautions: General Precautions, Fall Risk  Required Braces or Orthoses?: No  Position Activity Restriction  Other position/activity restrictions: LUE IV, up as mikey per RN Jensen, UP w/ assist     Subjective   General  Patient assessed for rehabilitation services?: Yes  Response To Previous Treatment: Not applicable  Family / Caregiver Present: No  Follows Commands: Within Functional Limits  General Comment  Comments: RN and pt agreeable to therapy. Pt supine in bed upon arrival.  Pt requiring encouragement but cooperative throughout.   Subjective  Subjective: Pt reporting that he has arthritis but denying any pain at time of PT eval.         Social/Functional History  Social/Functional History  Lives With: Spouse  Type of Home: Apartment (facility)  Home Layout: One level  Home Access: Ramped entrance, Stairs to enter without rails  Entrance Stairs - Number of Steps: 4  Bathroom Shower/Tub: Tub/Shower unit  Bathroom Toilet: Handicap height  Bathroom Equipment: Grab bars in shower, Shower chair, Grab bars around toilet  Home Equipment: Electric scooter, Walker, rolling (uses RW in home, uses scooter in community)  Has the patient had two or more falls in the past year or any fall with injury in the past year?: Yes (slid out of bed ~1-2 weeks ago, pt reports 5 other recent falls \"legs get weak\" - states L leg sergio)  Receives Help From: Family (Pt reports 2 adult children in the area)  ADL Assistance: Independent  Homemaking Assistance: Independent  Homemaking Responsibilities: Yes  Ambulation Assistance: Independent  Transfer Assistance: Independent  Active : No  Patient's  Info: wife  Occupation: Retired  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 Pt appears to be a questionable historian reporting inconsistences throughout. Vision/Hearing  Vision  Vision: Impaired (denies acute visual changes)  Vision Exceptions: Wears glasses for reading  Hearing  Hearing: Exceptions to [facility]  Hearing Exceptions: Hard of hearing/hearing concerns; No hearing aid    Cognition   Orientation  Overall Orientation Status: Impaired  Orientation Level: Disoriented to situation;Oriented to place;Oriented to time;Oriented to person  Cognition  Overall Cognitive Status: Exceptions  Arousal/Alertness: Appropriate responses to stimuli  Following Commands: Follows one step commands with repetition; Follows one step commands with increased time; Inconsistently follows commands  Attention Span: Attends with cues to redirect  Memory: Decreased short term memory;Decreased recall of recent events;Decreased long term memory  Safety Judgement: Decreased awareness of need for assistance;Decreased awareness of need for safety  Problem Solving: Decreased awareness of errors;Assistance required to identify errors made;Assistance required to generate solutions;Assistance required to implement solutions;Assistance required to correct errors made  Insights: Not aware of deficits  Initiation: Requires cues for some  Sequencing: Requires cues for some  Cognition Comment: Pt demonstrating impulsive behaviors and poor safety awareness throughout session w/ no regard for lines or equipment requiring MAX verbal cueing and education throughout w/ poor return demo.      Objective      Observation/Palpation  Posture: Poor (forward flexed trunk, forward head, rounded shoulders)  Observation: scattered bruising BUE, fragile skin  Edema: BUE  Gross Assessment  Sensation: Intact (denies numbness/tingling)     AROM RLE (degrees)  RLE AROM: WFL  AROM LLE (degrees)  LLE AROM : WFL  AROM RUE (degrees)  RUE General AROM: See OT assessment for detail  AROM LUE (degrees)  LUE AROM : WFL  LUE General AROM: See OT assessment for detail  Strength RLE  Strength RLE: Exception  Comment: Grossly 3+/5  Strength LLE  Strength LLE: Exception  Comment: Grossly 3+/5  Strength RUE  Comment: See OT assessment for detail  Strength LUE  Comment: See OT assessment for detail          Bed mobility  Supine to Sit: Maximum assistance;2 Person assistance  Sit to Supine: Maximum assistance;2 Person assistance  Scooting: Maximal assistance;2 Person assistance  Bed Mobility Comments: Pt w/ significant difficulty throughout bed mobilty this date requiring increased time and effort to perform tasks throughout. Pt requiring max verbal and tactile cueing for initiation, progression, and sequencing of BLE & trunk throughout w/ fair return demo. Pt requiring hand-over-hand cueing for use of bedrails for UE assist w/ fair return demo. Upon sitting EOB, pt initially requiring ModA to maintain static sitting balance however progressing to Chandana throughout. Complete assist for safe line mgmt required throughout. Transfers  Sit to Stand: Maximum Assistance;2 Person Assistance  Stand to sit: Maximum Assistance;2 Person Assistance  Lateral Transfers: Dependent/Total (sarah stedy)  Comment: Pt performed 3 STS transfers throughout session (1x RW, 2x sarah stedy) demonstrating poor steadiness throughout. Pt initially standing w/ use of RW requiring MAX verbal and tactile cueing for proper hand placement throughout transfers w/ POOR return demo. While standing, pt demonstrating poor posture requiring max verbal and tactile cueing for activation of hip extensors to correct w/ POOR return demo. Pt stood for <30 seconds and then sat w/o warning to staff demonstrating poor eccentric quad control throughout stand>sit transfers.   Pt then performed 2 STS transfers in [address] [place] [address] this date requiring use of rocking motion to initiate transfers & max verbal cueing for safe feet & B hand placement throughout. Pt continuing to demonstrate poor standing tolerance and poor standing posture throughout. Ambulation  Comments: Unable to attempt ambulation this date given pt's deficits & high levels of assist currently required for safe transfers. Will continue to progress as able. Balance  Posture: Poor  Sitting - Static: Fair;-  Sitting - Dynamic: Poor;+  Standing - Static: Poor  Single Leg Stance R Le  Single Leg Stance L Le  Comments: Standing balance assessed w/ sarah stedy           AM-PAC Score  AM-PAC Inpatient Mobility Raw Score : 10 (22)  AM-PAC Inpatient T-Scale Score : 32.29 (22)  Mobility Inpatient CMS 0-100% Score: 76.75 (22)  Mobility Inpatient CMS G-Code Modifier : CL (22)          Functional Outcome Measure-   Single Leg Stance Test:  0 sec. (<5 sec.= fall risk)      Goals  Short Term Goals  Time Frame for Short term goals: 12 visits  Short term goal 1: Pt to demonstrate bed mobility Karley  Short term goal 2: Pt to perform STS transfers w/ RW Chandana  Short term goal 3: Pt to ambulate at least 20ft w/ RW ModA  Short term goal 4: Pt to actively participate in at least 30 minutes of physical therapy for ther act, ther ex, balance, gait, and endurance training  Short term goal 5: Pt to be indep w/ pressure relief techniques in order to prevent pressure injuries  Patient Goals   Patient goals : \"To take a nap\"       Education  Patient Education  Education Given To: Patient  Education Provided: Role of Therapy;Plan of Care;Energy Conservation;Transfer Training;Equipment  Education Provided Comments: Pt educated on: purpose of acute PT eval, importance of continued mobility throughout admission, general safety awareness, prevention of sedentary complications, pursed lip breathing, fall risk prevention, safe transfers w/ RW, safe transfers w/ sarah stedy, proper posture, and PT POC. Pt w/ no evidence of learning.   Pt requires continued reinforcement of education. Education Method: Verbal  Barriers to Learning: Cognition  Education Outcome: Continued education needed      Therapy Time   Individual Concurrent Group Co-treatment   Time In 1027         Time Out 7649         Minutes 61         Treatment time: 50 minutes     Co-treatment with OT warranted secondary to decreased safety and independence requiring 2 skilled therapy professionals to address individual discipline's goals. PT addressing pre gait trunk strengthening, transfer training, and postural control in sitting.       Chris Palomo, PT

## 2022-09-12 NOTE — PLAN OF CARE
Problem: Discharge Planning  Goal: Discharge to home or other facility with appropriate resources  9/12/2022 0040 by Inge Hammans, RN  Outcome: Progressing  Flowsheets  Taken 9/12/2022 0001 by Inge Hammans, RN  Discharge to home or other facility with appropriate resources: Identify barriers to discharge with patient and caregiver  Taken 9/11/2022 1930 by Inge Hammans, RN  Discharge to home or other facility with appropriate resources: Identify barriers to discharge with patient and caregiver  Taken 9/11/2022 1800 by Chaya Posey RN  Discharge to home or other facility with appropriate resources:   Identify barriers to discharge with patient and caregiver   Arrange for needed discharge resources and transportation as appropriate   Identify discharge learning needs (meds, wound care, etc)   Refer to discharge planning if patient needs post-hospital services based on physician order or complex needs related to functional status, cognitive ability or social support system  9/11/2022 1507 by Chaya Posey RN  Outcome: Progressing  Flowsheets  Taken 9/11/2022 1400  Discharge to home or other facility with appropriate resources:   Identify barriers to discharge with patient and caregiver   Arrange for needed discharge resources and transportation as appropriate   Identify discharge learning needs (meds, wound care, etc)   Refer to discharge planning if patient needs post-hospital services based on physician order or complex needs related to functional status, cognitive ability or social support system  Taken 9/11/2022 0830  Discharge to home or other facility with appropriate resources:   Identify barriers to discharge with patient and caregiver   Arrange for needed discharge resources and transportation as appropriate   Identify discharge learning needs (meds, wound care, etc)   Refer to discharge planning if patient needs post-hospital services based on physician order or complex needs related to functional status, cognitive ability or social support system     Problem: Pain  Goal: Verbalizes/displays adequate comfort level or baseline comfort level  9/12/2022 0040 by Rebekah Johnson RN  Outcome: Progressing  9/11/2022 1507 by Dawood Marinelli RN  Outcome: Progressing  Flowsheets  Taken 9/11/2022 1400  Verbalizes/displays adequate comfort level or baseline comfort level:   Encourage patient to monitor pain and request assistance   Assess pain using appropriate pain scale   Administer analgesics based on type and severity of pain and evaluate response   Implement non-pharmacological measures as appropriate and evaluate response   Notify Licensed Independent Practitioner if interventions unsuccessful or patient reports new pain  Taken 9/11/2022 0830  Verbalizes/displays adequate comfort level or baseline comfort level:   Encourage patient to monitor pain and request assistance   Administer analgesics based on type and severity of pain and evaluate response   Assess pain using appropriate pain scale   Implement non-pharmacological measures as appropriate and evaluate response   Notify Licensed Independent Practitioner if interventions unsuccessful or patient reports new pain  Taken 9/11/2022 0815  Verbalizes/displays adequate comfort level or baseline comfort level:   Encourage patient to monitor pain and request assistance   Assess pain using appropriate pain scale   Administer analgesics based on type and severity of pain and evaluate response   Implement non-pharmacological measures as appropriate and evaluate response   Notify Licensed Independent Practitioner if interventions unsuccessful or patient reports new pain     Problem: Safety - Adult  Goal: Free from fall injury  9/12/2022 0040 by Rebekah Johnson RN  Outcome: Progressing  9/11/2022 1507 by Dawood Marinelli RN  Outcome: Progressing  Flowsheets (Taken 9/11/2022 0800)  Free From Fall Injury: Instruct family/caregiver on patient safety Problem: Chronic Conditions and Co-morbidities  Goal: Patient's chronic conditions and co-morbidity symptoms are monitored and maintained or improved  Recent Flowsheet Documentation  Taken 9/12/2022 0001 by Ana Tse RN  Care Plan - Patient's Chronic Conditions and Co-Morbidity Symptoms are Monitored and Maintained or Improved:   Monitor and assess patient's chronic conditions and comorbid symptoms for stability, deterioration, or improvement   Collaborate with multidisciplinary team to address chronic and comorbid conditions and prevent exacerbation or deterioration  Taken 9/11/2022 1930 by Ana Tse RN  Care Plan - Patient's Chronic Conditions and Co-Morbidity Symptoms are Monitored and Maintained or Improved: Monitor and assess patient's chronic conditions and comorbid symptoms for stability, deterioration, or improvement  Taken 9/11/2022 1800 by Salvador Anderson 34 - Patient's Chronic Conditions and Co-Morbidity Symptoms are Monitored and Maintained or Improved:   Monitor and assess patient's chronic conditions and comorbid symptoms for stability, deterioration, or improvement   Collaborate with multidisciplinary team to address chronic and comorbid conditions and prevent exacerbation or deterioration   Update acute care plan with appropriate goals if chronic or comorbid symptoms are exacerbated and prevent overall improvement and discharge  9/11/2022 1507 by Rob Briones RN  Outcome: Progressing  Flowsheets  Taken 9/11/2022 1400  Care Plan - Patient's Chronic Conditions and Co-Morbidity Symptoms are Monitored and Maintained or Improved:   Monitor and assess patient's chronic conditions and comorbid symptoms for stability, deterioration, or improvement   Collaborate with multidisciplinary team to address chronic and comorbid conditions and prevent exacerbation or deterioration   Update acute care plan with appropriate goals if chronic or comorbid symptoms are exacerbated and prevent overall improvement and discharge  Taken 9/11/2022 0830  Care Plan - Patient's Chronic Conditions and Co-Morbidity Symptoms are Monitored and Maintained or Improved:   Monitor and assess patient's chronic conditions and comorbid symptoms for stability, deterioration, or improvement   Collaborate with multidisciplinary team to address chronic and comorbid conditions and prevent exacerbation or deterioration   Update acute care plan with appropriate goals if chronic or comorbid symptoms are exacerbated and prevent overall improvement and discharge     Problem: Neurosensory - Adult  Goal: Achieves stable or improved neurological status  9/12/2022 0040 by Brennon Solis RN  Outcome: Progressing  Flowsheets  Taken 9/12/2022 0001 by Brennon Solis RN  Achieves stable or improved neurological status:   Assess for and report changes in neurological status   Maintain blood pressure and fluid volume within ordered parameters to optimize cerebral perfusion and minimize risk of hemorrhage  Taken 9/11/2022 1930 by Brennon Solis RN  Achieves stable or improved neurological status:   Assess for and report changes in neurological status   Maintain blood pressure and fluid volume within ordered parameters to optimize cerebral perfusion and minimize risk of hemorrhage  Taken 9/11/2022 1800 by Guillermina Kim RN  Achieves stable or improved neurological status:   Assess for and report changes in neurological status   Initiate measures to prevent increased intracranial pressure   Maintain blood pressure and fluid volume within ordered parameters to optimize cerebral perfusion and minimize risk of hemorrhage   Monitor temperature, glucose, and sodium.  Initiate appropriate interventions as ordered  9/11/2022 1507 by Guillermina Kim RN  Outcome: Progressing  Flowsheets  Taken 9/11/2022 1400  Achieves stable or improved neurological status:   Assess for and report changes in neurological status   Initiate measures to prevent increased intracranial pressure   Maintain blood pressure and fluid volume within ordered parameters to optimize cerebral perfusion and minimize risk of hemorrhage   Monitor temperature, glucose, and sodium.  Initiate appropriate interventions as ordered  Taken 9/11/2022 0830  Achieves stable or improved neurological status:   Assess for and report changes in neurological status   Initiate measures to prevent increased intracranial pressure     Problem: Respiratory - Adult  Goal: Achieves optimal ventilation and oxygenation  9/12/2022 0040 by Charlotte Lu RN  Outcome: Progressing  Flowsheets  Taken 9/12/2022 0001 by Charlotte Lu RN  Achieves optimal ventilation and oxygenation:   Assess for changes in respiratory status   Assess for changes in mentation and behavior  Taken 9/11/2022 1930 by Charlotte Lu RN  Achieves optimal ventilation and oxygenation:   Assess for changes in respiratory status   Assess for changes in mentation and behavior  Taken 9/11/2022 1800 by Piper William RN  Achieves optimal ventilation and oxygenation:   Assess for changes in respiratory status   Assess for changes in mentation and behavior   Position to facilitate oxygenation and minimize respiratory effort   Oxygen supplementation based on oxygen saturation or arterial blood gases   Assess the need for suctioning and aspirate as needed   Assess and instruct to report shortness of breath or any respiratory difficulty   Respiratory therapy support as indicated   Encourage broncho-pulmonary hygiene including cough, deep breathe, incentive spirometry  9/11/2022 1507 by Piper William RN  Outcome: Progressing  Flowsheets  Taken 9/11/2022 1400  Achieves optimal ventilation and oxygenation:   Assess for changes in respiratory status   Assess for changes in mentation and behavior   Position to facilitate oxygenation and minimize respiratory effort   Oxygen supplementation based on oxygen saturation or arterial blood gases   Encourage broncho-pulmonary hygiene including cough, deep breathe, incentive spirometry   Assess the need for suctioning and aspirate as needed   Assess and instruct to report shortness of breath or any respiratory difficulty   Respiratory therapy support as indicated  Taken 9/11/2022 0830  Achieves optimal ventilation and oxygenation:   Assess for changes in respiratory status   Assess for changes in mentation and behavior   Position to facilitate oxygenation and minimize respiratory effort   Oxygen supplementation based on oxygen saturation or arterial blood gases   Encourage broncho-pulmonary hygiene including cough, deep breathe, incentive spirometry   Assess and instruct to report shortness of breath or any respiratory difficulty   Respiratory therapy support as indicated     Problem: Cardiovascular - Adult  Goal: Absence of cardiac dysrhythmias or at baseline  9/12/2022 0040 by Jaylyn Amaya RN  Outcome: Progressing  Flowsheets  Taken 9/12/2022 0001 by Jaylyn Amaya RN  Absence of cardiac dysrhythmias or at baseline:   Monitor cardiac rate and rhythm   Assess for signs of decreased cardiac output  Taken 9/11/2022 1930 by Jaylyn Amaya RN  Absence of cardiac dysrhythmias or at baseline:   Monitor cardiac rate and rhythm   Assess for signs of decreased cardiac output  Taken 9/11/2022 1800 by Jorge Figueroa RN  Absence of cardiac dysrhythmias or at baseline:   Monitor cardiac rate and rhythm   Assess for signs of decreased cardiac output   Administer antiarrhythmia medication and electrolyte replacement as ordered  9/11/2022 1507 by Jorge Figueroa RN  Outcome: Progressing  Flowsheets  Taken 9/11/2022 1400  Absence of cardiac dysrhythmias or at baseline:   Monitor cardiac rate and rhythm   Assess for signs of decreased cardiac output   Administer antiarrhythmia medication and electrolyte replacement as ordered  Taken 9/11/2022 0830  Absence of cardiac dysrhythmias or at baseline:   Monitor cardiac rate and rhythm   Assess for signs of decreased cardiac output   Administer antiarrhythmia medication and electrolyte replacement as ordered     Problem: Skin/Tissue Integrity - Adult  Goal: Skin integrity remains intact  9/12/2022 0040 by Rebekah Johnson RN  Outcome: Progressing  Flowsheets  Taken 9/12/2022 0001 by Rebekah Johnson RN  Skin Integrity Remains Intact: Monitor for areas of redness and/or skin breakdown  Taken 9/11/2022 2134 by Rebekah Johnson RN  Skin Integrity Remains Intact: Monitor for areas of redness and/or skin breakdown  Taken 9/11/2022 1930 by Rebekah Johnson RN  Skin Integrity Remains Intact: Monitor for areas of redness and/or skin breakdown  Taken 9/11/2022 1800 by Dawood Marinelli RN  Skin Integrity Remains Intact:   Monitor for areas of redness and/or skin breakdown   Assess vascular access sites hourly  9/11/2022 1507 by Dawood Marinelli RN  Outcome: Progressing  Flowsheets  Taken 9/11/2022 1400  Skin Integrity Remains Intact:   Monitor for areas of redness and/or skin breakdown   Assess vascular access sites hourly  Taken 9/11/2022 0830  Skin Integrity Remains Intact:   Monitor for areas of redness and/or skin breakdown   Assess vascular access sites hourly  Taken 9/11/2022 0800  Skin Integrity Remains Intact:   Monitor for areas of redness and/or skin breakdown   Assess vascular access sites hourly  Goal: Oral mucous membranes remain intact  9/12/2022 0040 by Rebekah Johnson RN  Outcome: Progressing  Flowsheets  Taken 9/12/2022 0001 by Rebekah Johnson RN  Oral Mucous Membranes Remain Intact: Assess oral mucosa and hygiene practices  Taken 9/11/2022 1930 by Rebekah Johnson RN  Oral Mucous Membranes Remain Intact: Assess oral mucosa and hygiene practices  Taken 9/11/2022 1800 by Dawood Marinelli RN  Oral Mucous Membranes Remain Intact:   Assess oral mucosa and hygiene practices   Implement preventative oral hygiene regimen   Implement oral medicated treatments as ordered  9/11/2022 1507 by Kristin Lemsu RN  Outcome: Progressing  Flowsheets  Taken 9/11/2022 1400  Oral Mucous Membranes Remain Intact:   Assess oral mucosa and hygiene practices   Implement preventative oral hygiene regimen   Implement oral medicated treatments as ordered  Taken 9/11/2022 0830  Oral Mucous Membranes Remain Intact:   Assess oral mucosa and hygiene practices   Implement preventative oral hygiene regimen   Implement oral medicated treatments as ordered  Taken 9/11/2022 0800  Oral Mucous Membranes Remain Intact:   Assess oral mucosa and hygiene practices   Implement preventative oral hygiene regimen   Implement oral medicated treatments as ordered     Problem: Musculoskeletal - Adult  Goal: Return mobility to safest level of function  9/12/2022 0040 by Yohan Harley RN  Outcome: Progressing  Flowsheets  Taken 9/12/2022 0001 by Yohan Harley RN  Return Mobility to Safest Level of Function:   Assess patient stability and activity tolerance for standing, transferring and ambulating with or without assistive devices   Ensure adequate protection for wounds/incisions during mobilization  Taken 9/11/2022 1930 by Yohan Harley RN  Return Mobility to Safest Level of Function:   Assess patient stability and activity tolerance for standing, transferring and ambulating with or without assistive devices   Ensure adequate protection for wounds/incisions during mobilization  Taken 9/11/2022 1800 by Kristin Lemus RN  Return Mobility to [address] Level of Function:   Assess patient stability and activity tolerance for standing, transferring and ambulating with or without assistive devices   Ensure adequate protection for wounds/incisions during mobilization   Obtain physical therapy/occupational therapy consults as needed   Instruct patient/family in ordered activity level   Assist with transfers and ambulation using safe patient handling equipment as needed  9/11/2022 1507 by Kristin Lemus RN  Outcome: Progressing  Flowsheets  Taken 9/11/2022 1400  Return Mobility to Safest Level of Function:   Assess patient stability and activity tolerance for standing, transferring and ambulating with or without assistive devices   Assist with transfers and ambulation using safe patient handling equipment as needed   Ensure adequate protection for wounds/incisions during mobilization   Obtain physical therapy/occupational therapy consults as needed   Instruct patient/family in ordered activity level  Taken 9/11/2022 0830  Return Mobility to Safest Level of Function:   Assess patient stability and activity tolerance for standing, transferring and ambulating with or without assistive devices   Assist with transfers and ambulation using safe patient handling equipment as needed   Ensure adequate protection for wounds/incisions during mobilization   Obtain physical therapy/occupational therapy consults as needed   Instruct patient/family in ordered activity level  Goal: Maintain proper alignment of affected body part  Recent Flowsheet Documentation  Taken 9/12/2022 0001 by Aleida Cabrera RN  Maintain proper alignment of affected body part: Support and protect limb and body alignment per provider's orders  Taken 9/11/2022 1930 by Aleida Cabrera RN  Maintain proper alignment of affected body part: Support and protect limb and body alignment per provider's orders  Taken 9/11/2022 1800 by Igor Wharton RN  Maintain proper alignment of affected body part:   Support and protect limb and body alignment per provider's orders   Instruct and reinforce with patient and family use of appropriate assistive device and precautions (e.g. spinal or hip dislocation precautions)  9/11/2022 1507 by Igor Wharton RN  Outcome: Progressing  Flowsheets  Taken 9/11/2022 1400  Maintain proper alignment of affected body part:   Support and protect limb and body alignment per provider's orders   Instruct and reinforce with patient and family use of appropriate assistive device and precautions (e.g. spinal or hip dislocation precautions)  Taken 9/11/2022 0830  Maintain proper alignment of affected body part:   Support and protect limb and body alignment per provider's orders   Instruct and reinforce with patient and family use of appropriate assistive device and precautions (e.g. spinal or hip dislocation precautions)  Goal: Return ADL status to a safe level of function  Recent Flowsheet Documentation  Taken 9/12/2022 0001 by Rebekah Johnson RN  Return ADL Status to a Safe Level of Function: Administer medication as ordered  Taken 9/11/2022 1930 by Rebekah Johnson RN  Return ADL Status to a Safe Level of Function: Administer medication as ordered  Taken 9/11/2022 1800 by Dawood Marinelli RN  Return ADL Status to a Safe Level of Function:   Administer medication as ordered   Assess activities of daily living deficits and provide assistive devices as needed   Obtain physical therapy/occupational therapy consults as needed   Assist and instruct patient to increase activity and self care as tolerated  9/11/2022 1507 by Dawood Marinelli RN  Outcome: Progressing  Flowsheets  Taken 9/11/2022 1400  Return ADL Status to a Safe Level of Function:   Administer medication as ordered   Assess activities of daily living deficits and provide assistive devices as needed   Obtain physical therapy/occupational therapy consults as needed   Assist and instruct patient to increase activity and self care as tolerated  Taken 9/11/2022 0830  Return ADL Status to a Safe Level of Function:   Administer medication as ordered   Assess activities of daily living deficits and provide assistive devices as needed   Obtain physical therapy/occupational therapy consults as needed   Assist and instruct patient to increase activity and self care as tolerated     Problem: Gastrointestinal - Adult  Goal: Minimal or absence of nausea and vomiting  Recent Flowsheet Documentation  Taken 9/12/2022 0001 by Robert Schultz RN  Minimal or absence of nausea and vomiting: Administer IV fluids as ordered to ensure adequate hydration  Taken 9/11/2022 1930 by Robert Schultz RN  Minimal or absence of nausea and vomiting: Administer IV fluids as ordered to ensure adequate hydration  Taken 9/11/2022 1800 by Armando Resendez RN  Minimal or absence of nausea and vomiting:   Administer IV fluids as ordered to ensure adequate hydration   Maintain NPO status until nausea and vomiting are resolved   Administer ordered antiemetic medications as needed   Provide nonpharmacologic comfort measures as appropriate   Advance diet as tolerated, if ordered   Nutrition consult to assist patient with adequate nutrition and appropriate food choices  9/11/2022 1507 by Armando Resendez RN  Outcome: Progressing  Flowsheets  Taken 9/11/2022 1400  Minimal or absence of nausea and vomiting:   Administer IV fluids as ordered to ensure adequate hydration   Maintain NPO status until nausea and vomiting are resolved   Administer ordered antiemetic medications as needed   Provide nonpharmacologic comfort measures as appropriate   Advance diet as tolerated, if ordered   Nutrition consult to assist patient with adequate nutrition and appropriate food choices  Taken 9/11/2022 0830  Minimal or absence of nausea and vomiting:   Administer IV fluids as ordered to ensure adequate hydration   Maintain NPO status until nausea and vomiting are resolved   Administer ordered antiemetic medications as needed   Provide nonpharmacologic comfort measures as appropriate   Advance diet as tolerated, if ordered   Nutrition consult to assist patient with adequate nutrition and appropriate food choices  Goal: Maintains or returns to baseline bowel function  Recent Flowsheet Documentation  Taken 9/12/2022 0001 by Robert Schultz RN  Maintains or returns to baseline bowel function:   Assess bowel function   Administer IV fluids as ordered to ensure adequate hydration  Taken 9/11/2022 1930 by Deng Moran RN  Maintains or returns to baseline bowel function:   Assess bowel function   Administer IV fluids as ordered to ensure adequate hydration  Taken 9/11/2022 1800 by Milo Williamson RN  Maintains or returns to baseline bowel function:   Assess bowel function   Encourage oral fluids to ensure adequate hydration   Administer IV fluids as ordered to ensure adequate hydration   Administer ordered medications as needed   Encourage mobilization and activity   Nutrition consult to assist patient with appropriate food choices  9/11/2022 1507 by Milo Williamson RN  Outcome: Progressing  Flowsheets  Taken 9/11/2022 1400  Maintains or returns to baseline bowel function:   Assess bowel function   Encourage oral fluids to ensure adequate hydration   Administer IV fluids as ordered to ensure adequate hydration   Administer ordered medications as needed   Nutrition consult to assist patient with appropriate food choices   Encourage mobilization and activity  Taken 9/11/2022 0830  Maintains or returns to baseline bowel function:   Assess bowel function   Encourage oral fluids to ensure adequate hydration   Administer IV fluids as ordered to ensure adequate hydration   Administer ordered medications as needed   Encourage mobilization and activity   Nutrition consult to assist patient with appropriate food choices  Goal: Maintains adequate nutritional intake  Recent Flowsheet Documentation  Taken 9/12/2022 0001 by Deng Moran RN  Maintains adequate nutritional intake: Monitor intake and output, weight and lab values  Taken 9/11/2022 1930 by Deng Moran RN  Maintains adequate nutritional intake: Monitor intake and output, weight and lab values  Taken 9/11/2022 1800 by Milo Williamson RN  Maintains adequate nutritional intake:   Monitor percentage of each meal consumed   Identify factors contributing to decreased intake, treat as appropriate Assist with meals as needed   Monitor intake and output, weight and lab values   Obtain nutritional consult as needed  9/11/2022 1507 by Mason Garsia RN  Outcome: Progressing  Flowsheets  Taken 9/11/2022 1400  Maintains adequate nutritional intake:   Monitor percentage of each meal consumed   Identify factors contributing to decreased intake, treat as appropriate   Assist with meals as needed   Monitor intake and output, weight and lab values   Obtain nutritional consult as needed  Taken 9/11/2022 0830  Maintains adequate nutritional intake:   Monitor percentage of each meal consumed   Identify factors contributing to decreased intake, treat as appropriate   Assist with meals as needed   Monitor intake and output, weight and lab values   Obtain nutritional consult as needed  Goal: Establish and maintain optimal ostomy function  Recent Flowsheet Documentation  Taken 9/12/2022 0001 by Cole Robledo RN  Establish and maintain optimal ostomy function: Administer IV fluids and TPN as ordered  Taken 9/11/2022 1930 by Cole Robledo RN  Establish and maintain optimal ostomy function: Administer IV fluids and TPN as ordered  Taken 9/11/2022 1800 by Mason Garsia RN  Establish and maintain optimal ostomy function:   Monitor output from ostomies   Administer IV fluids and TPN as ordered   Nutrition consult   Gastric suctioning as ordered   Infuse IV Fluids/TPN as ordered  9/11/2022 1507 by Mason Garsia RN  Outcome: Progressing  Flowsheets  Taken 9/11/2022 1400  Establish and maintain optimal ostomy function:   Monitor output from ostomies   Administer IV fluids and TPN as ordered   Introduce and advance enteral feedings as ordered   Nutrition consult   Infuse IV Fluids/TPN as ordered  Taken 9/11/2022 0830  Establish and maintain optimal ostomy function:   Monitor output from ostomies   Administer IV fluids and TPN as ordered   Nutrition consult   Infuse IV Fluids/TPN as ordered     Problem: Genitourinary - Adult  Goal: Absence of urinary retention  9/12/2022 0040 by Brennon Solis RN  Outcome: Progressing  Flowsheets  Taken 9/12/2022 0001 by Brennon Solis RN  Absence of urinary retention:   Assess patients ability to void and empty bladder   Monitor intake/output and perform bladder scan as needed  Taken 9/11/2022 1930 by Brennon Solis RN  Absence of urinary retention:   Assess patients ability to void and empty bladder   Monitor intake/output and perform bladder scan as needed  Taken 9/11/2022 1800 by Guillermina Kim RN  Absence of urinary retention:   Assess patients ability to void and empty bladder   Monitor intake/output and perform bladder scan as needed   Place urinary catheter per Licensed Independent Practitioner order if needed   Discuss with Licensed Independent Practitioner  medications to alleviate retention as needed   Discuss catheterization for long term situations as appropriate  9/11/2022 1507 by Guillermina Kim RN  Outcome: Progressing  Flowsheets  Taken 9/11/2022 1400  Absence of urinary retention:   Assess patients ability to void and empty bladder   Monitor intake/output and perform bladder scan as needed   Place urinary catheter per Licensed Independent Practitioner order if needed   Discuss with Licensed Independent Practitioner  medications to alleviate retention as needed   Discuss catheterization for long term situations as appropriate  Taken 9/11/2022 0830  Absence of urinary retention:   Assess patients ability to void and empty bladder   Monitor intake/output and perform bladder scan as needed   Place urinary catheter per Licensed Independent Practitioner order if needed   Discuss catheterization for long term situations as appropriate   Discuss with Licensed Independent Practitioner  medications to alleviate retention as needed  Goal: Urinary catheter remains patent  9/12/2022 0040 by Brennon Solis RN  Outcome: Progressing  Flowsheets  Taken 9/12/2022 0001 by Emily Sanders RN  Urinary catheter remains patent: Assess patency of urinary catheter  Taken 9/11/2022 1930 by Emily Sanders RN  Urinary catheter remains patent: Assess patency of urinary catheter  Taken 9/11/2022 1800 by Cee Saleh RN  Urinary catheter remains patent:   Assess patency of urinary catheter   Irrigate catheter per Licensed Independent Practitioner order if indicated and notify Licensed Independent Practitioner if unable to irrigate   Assess need for a larger catheter size or a 3-way catheter for continuous bladder irrigation  9/11/2022 1507 by Cee Saleh RN  Outcome: Progressing  Flowsheets  Taken 9/11/2022 1400  Urinary catheter remains patent:   Assess patency of urinary catheter   Irrigate catheter per Licensed Independent Practitioner order if indicated and notify Licensed Independent Practitioner if unable to irrigate   Assess need for a larger catheter size or a 3-way catheter for continuous bladder irrigation  Taken 9/11/2022 0830  Urinary catheter remains patent:   Assess patency of urinary catheter   Irrigate catheter per Licensed Independent Practitioner order if indicated and notify Licensed Independent Practitioner if unable to irrigate   Assess need for a larger catheter size or a 3-way catheter for continuous bladder irrigation     Problem: Infection - Adult  Goal: Absence of infection at discharge  9/12/2022 0040 by Emily Sanders RN  Outcome: Progressing  Flowsheets  Taken 9/12/2022 0001 by Emily Sanders RN  Absence of infection at discharge:   Assess and monitor for signs and symptoms of infection   Monitor lab/diagnostic results   Monitor all insertion sites i.e., indwelling lines, tubes and drains  Taken 9/11/2022 1930 by Emily Sanders RN  Absence of infection at discharge:   Assess and monitor for signs and symptoms of infection   Monitor lab/diagnostic results   Monitor all insertion sites i.e., indwelling lines, tubes and drains  Taken 9/11/2022 1800 by Azeem Alves RN  Absence of infection at discharge:   Assess and monitor for signs and symptoms of infection   Monitor lab/diagnostic results   Monitor all insertion sites i.e., indwelling lines, tubes and drains   Monitor endotracheal (as able) and nasal secretions for changes in amount and color   Identify and instruct in appropriate isolation precautions for identified infection/condition   Instruct and encourage patient and family to use good hand hygiene technique   Administer medications as ordered  9/11/2022 1507 by Azeem Alves RN  Outcome: Progressing  Flowsheets  Taken 9/11/2022 1400  Absence of infection at discharge:   Assess and monitor for signs and symptoms of infection   Monitor lab/diagnostic results   Monitor all insertion sites i.e., indwelling lines, tubes and drains   Monitor endotracheal (as able) and nasal secretions for changes in amount and color   Administer medications as ordered   Instruct and encourage patient and family to use good hand hygiene technique   Identify and instruct in appropriate isolation precautions for identified infection/condition  Taken 9/11/2022 0830  Absence of infection at discharge:   Assess and monitor for signs and symptoms of infection   Monitor all insertion sites i.e., indwelling lines, tubes and drains   Monitor lab/diagnostic results   Administer medications as ordered   Instruct and encourage patient and family to use good hand hygiene technique   Identify and instruct in appropriate isolation precautions for identified infection/condition  Goal: Absence of infection during hospitalization  9/12/2022 0040 by Nelson Leigh RN  Outcome: Progressing  Flowsheets  Taken 9/12/2022 0001 by Nelson Leigh RN  Absence of infection during hospitalization:   Assess and monitor for signs and symptoms of infection   Monitor lab/diagnostic results   Monitor all insertion sites i.e., indwelling lines, tubes and drains  Taken 9/11/2022 1930 by Charlotte Trevorton, RN  Absence of infection during hospitalization:   Assess and monitor for signs and symptoms of infection   Monitor lab/diagnostic results   Monitor all insertion sites i.e., indwelling lines, tubes and drains  Taken 9/11/2022 1800 by Piper William RN  Absence of infection during hospitalization:   Assess and monitor for signs and symptoms of infection   Monitor lab/diagnostic results   Monitor all insertion sites i.e., indwelling lines, tubes and drains   Monitor endotracheal (as able) and nasal secretions for changes in amount and color   Administer medications as ordered   Instruct and encourage patient and family to use good hand hygiene technique   Identify and instruct in appropriate isolation precautions for identified infection/condition  9/11/2022 1507 by Piper William RN  Outcome: Progressing  Flowsheets  Taken 9/11/2022 1400  Absence of infection during hospitalization:   Assess and monitor for signs and symptoms of infection   Monitor lab/diagnostic results   Monitor all insertion sites i.e., indwelling lines, tubes and drains   Monitor endotracheal (as able) and nasal secretions for changes in amount and color   Administer medications as ordered   Instruct and encourage patient and family to use good hand hygiene technique   Identify and instruct in appropriate isolation precautions for identified infection/condition  Taken 9/11/2022 0830  Absence of infection during hospitalization:   Assess and monitor for signs and symptoms of infection   Monitor lab/diagnostic results   Monitor all insertion sites i.e., indwelling lines, tubes and drains   Administer medications as ordered   Instruct and encourage patient and family to use good hand hygiene technique   Identify and instruct in appropriate isolation precautions for identified infection/condition  Goal: Absence of fever/infection during anticipated neutropenic period  9/12/2022 0040 by Charlotte Lu RN  Outcome: Progressing  Flowsheets  Taken 9/12/2022 0001 by Robert Schultz RN  Absence of fever/infection during anticipated neutropenic period: Monitor white blood cell count  Taken 9/11/2022 1930 by Robert Schultz RN  Absence of fever/infection during anticipated neutropenic period: Monitor white blood cell count  Taken 9/11/2022 1800 by Armando Resendez RN  Absence of fever/infection during anticipated neutropenic period:   Monitor white blood cell count   Administer growth factors as ordered   Implement neutropenic guidelines  9/11/2022 1507 by Armando Resendez RN  Outcome: Progressing  Flowsheets  Taken 9/11/2022 1400  Absence of fever/infection during anticipated neutropenic period:   Monitor white blood cell count   Administer growth factors as ordered   Implement neutropenic guidelines  Taken 9/11/2022 0830  Absence of fever/infection during anticipated neutropenic period:   Monitor white blood cell count   Administer growth factors as ordered   Implement neutropenic guidelines     Problem: Metabolic/Fluid and Electrolytes - Adult  Goal: Electrolytes maintained within normal limits  9/12/2022 0040 by Robert Schultz RN  Outcome: Progressing  Flowsheets  Taken 9/12/2022 0001 by Robert Schultz RN  Electrolytes maintained within normal limits: Monitor labs and assess patient for signs and symptoms of electrolyte imbalances  Taken 9/11/2022 1930 by Robert Schultz RN  Electrolytes maintained within normal limits:   Monitor labs and assess patient for signs and symptoms of electrolyte imbalances   Monitor response to electrolyte replacements, including repeat lab results as appropriate  Taken 9/11/2022 1800 by Armando Resendez RN  Electrolytes maintained within normal limits:   Monitor labs and assess patient for signs and symptoms of electrolyte imbalances   Administer electrolyte replacement as ordered   Monitor response to electrolyte replacements, including repeat lab results as appropriate   Fluid restriction as ordered   Instruct patient on fluid and nutrition restrictions as appropriate  9/11/2022 1507 by Jodie Rubin RN  Outcome: Progressing  Flowsheets  Taken 9/11/2022 1400  Electrolytes maintained within normal limits:   Monitor labs and assess patient for signs and symptoms of electrolyte imbalances   Administer electrolyte replacement as ordered   Monitor response to electrolyte replacements, including repeat lab results as appropriate   Fluid restriction as ordered   Instruct patient on fluid and nutrition restrictions as appropriate  Taken 9/11/2022 0830  Electrolytes maintained within normal limits:   Monitor labs and assess patient for signs and symptoms of electrolyte imbalances   Administer electrolyte replacement as ordered   Monitor response to electrolyte replacements, including repeat lab results as appropriate   Instruct patient on fluid and nutrition restrictions as appropriate  Goal: Hemodynamic stability and optimal renal function maintained  9/12/2022 0040 by Alexandra Stevenson RN  Outcome: Progressing  Flowsheets  Taken 9/12/2022 0001 by Alexandra Stevenson RN  Hemodynamic stability and optimal renal function maintained:   Monitor labs and assess for signs and symptoms of volume excess or deficit   Monitor intake, output and patient weight  Taken 9/11/2022 1930 by Alexandra Stevenson RN  Hemodynamic stability and optimal renal function maintained: Monitor labs and assess for signs and symptoms of volume excess or deficit  Taken 9/11/2022 1800 by Jodie Rubin RN  Hemodynamic stability and optimal renal function maintained:   Monitor labs and assess for signs and symptoms of volume excess or deficit   Monitor intake, output and patient weight   Monitor urine specific gravity, serum osmolarity and serum sodium as indicated or ordered   Monitor response to interventions for patient's volume status, including labs, urine output, blood pressure (other measures as available)   Encourage oral intake as appropriate   Instruct patient on fluid and nutrition restrictions as appropriate  9/11/2022 1507 by Musa Rodriguez RN  Outcome: Progressing  Flowsheets  Taken 9/11/2022 1400  Hemodynamic stability and optimal renal function maintained:   Monitor labs and assess for signs and symptoms of volume excess or deficit   Monitor intake, output and patient weight   Monitor urine specific gravity, serum osmolarity and serum sodium as indicated or ordered   Monitor response to interventions for patient's volume status, including labs, urine output, blood pressure (other measures as available)   Encourage oral intake as appropriate   Instruct patient on fluid and nutrition restrictions as appropriate  Taken 9/11/2022 0830  Hemodynamic stability and optimal renal function maintained:   Monitor labs and assess for signs and symptoms of volume excess or deficit   Monitor intake, output and patient weight   Monitor urine specific gravity, serum osmolarity and serum sodium as indicated or ordered   Monitor response to interventions for patient's volume status, including labs, urine output, blood pressure (other measures as available)   Encourage oral intake as appropriate   Instruct patient on fluid and nutrition restrictions as appropriate  Goal: Glucose maintained within prescribed range  9/12/2022 0040 by Marjorie Brown RN  Outcome: Progressing  Flowsheets  Taken 9/12/2022 0001 by Marjorie Brown RN  Glucose maintained within prescribed range:   Monitor blood glucose as ordered   Assess for signs and symptoms of hyperglycemia and hypoglycemia  Taken 9/11/2022 1930 by Marjorie Brown RN  Glucose maintained within prescribed range:   Monitor blood glucose as ordered   Assess for signs and symptoms of hyperglycemia and hypoglycemia  Taken 9/11/2022 1800 by Musa Rodriguez RN  Glucose maintained within prescribed range:   Monitor blood glucose as ordered   Assess for signs and symptoms of hyperglycemia and hypoglycemia   Administer ordered medications to maintain glucose within target range   Assess barriers to adequate nutritional intake and initiate nutrition consult as needed   Instruct patient on self management of diabetes and initiate consult as needed  9/11/2022 1507 by Delmas Runner, RN  Outcome: Progressing  Flowsheets  Taken 9/11/2022 1400  Glucose maintained within prescribed range:   Monitor blood glucose as ordered   Assess for signs and symptoms of hyperglycemia and hypoglycemia   Administer ordered medications to maintain glucose within target range   Assess barriers to adequate nutritional intake and initiate nutrition consult as needed   Instruct patient on self management of diabetes and initiate consult as needed  Taken 9/11/2022 0830  Glucose maintained within prescribed range:   Monitor blood glucose as ordered   Assess for signs and symptoms of hyperglycemia and hypoglycemia   Administer ordered medications to maintain glucose within target range   Instruct patient on self management of diabetes and initiate consult as needed   Assess barriers to adequate nutritional intake and initiate nutrition consult as needed     Problem: Hematologic - Adult  Goal: Maintains hematologic stability  9/12/2022 0040 by Janusz Rivera RN  Outcome: Progressing  Flowsheets  Taken 9/12/2022 0001 by Janusz Rivera RN  Maintains hematologic stability:   Assess for signs and symptoms of bleeding or hemorrhage   Monitor labs for bleeding or clotting disorders  Taken 9/11/2022 1930 by Janusz Rivera RN  Maintains hematologic stability: Assess for signs and symptoms of bleeding or hemorrhage  Taken 9/11/2022 1800 by Delmas Runner, RN  Maintains hematologic stability:   Assess for signs and symptoms of bleeding or hemorrhage   Monitor labs for bleeding or clotting disorders   Administer blood products/factors as ordered  9/11/2022 1507 by Delmas Runner, RN  Outcome: Progressing  Flowsheets  Taken 9/11/2022 1400  Maintains hematologic stability:   Assess for signs and symptoms of bleeding or hemorrhage   Administer blood products/factors as ordered   Monitor labs for bleeding or clotting disorders  Taken 9/11/2022 0830  Maintains hematologic stability:   Assess for signs and symptoms of bleeding or hemorrhage   Monitor labs for bleeding or clotting disorders   Administer blood products/factors as ordered     Problem: ABCDS Injury Assessment  Goal: Absence of physical injury  Outcome: Progressing     Problem: Skin/Tissue Integrity  Goal: Absence of new skin breakdown  Description: 1. Monitor for areas of redness and/or skin breakdown  2. Assess vascular access sites hourly  3. Every 4-6 hours minimum:  Change oxygen saturation probe site  4. Every 4-6 hours:  If on nasal continuous positive airway pressure, respiratory therapy assess nares and determine need for appliance change or resting period.   Outcome: Progressing

## 2022-09-12 NOTE — PROGRESS NOTES
[facility]  Progress Note    Joon Kirby  MRN: [id number]  Date: 9/12/2022  LOS: 4     Reason for F/U :   E. coli sepsis    Impression :   E. coli sepsis secondary to E. coli urinary tract infection  And right hepatic lobe nodular foci presumably due to early abscesses  Colitis by CT imaging  Leukocytosis  Acute kidney injury  Cirrhosis and splenomegaly  Diabetes mellitus type 2  Chronic back pain    Recommendations:   Continue intravenous antimicrobial therapy with Rocephin  Clinically the patient is doing very well and does not have any subjective complaints. MRCP findings noted and there is some consideration/concern for early abscess in the right and left hepatic lobes   Question now becomes the duration of the antimicrobial therapy given the new concern for hepatic abscesses. The plan will be for discharge on antimicrobial therapy for 6 weeks with follow-up MRI imaging of the abdomen    Infection Control Recommendations:   Universal precautions    Discharge Planning:   Estimated Length of IV antimicrobials:6 Weeks  Patient will need Midline Catheter Insertion/ PICC line Insertion: No  Patient will need: Home IV , [place],  SNF,  LTAC: Undetermined  Patient willneed outpatient wound care: No    Medical Decision making / Summary of Stay:   Joon Kirby is a 80y.o.-year-old male who was initially admitted on 9/8/2022. Patient has known medical history of chronic back pain and diabetes mellitus type 2. Patient was brought to the emergency department at the discretion of his wife for altered mental status. He apparently took too many sleeping pills, stating that he wanted to fall asleep extra fast.  Upon arrival, blood sugars were in the 30s he did receive dextrose. Urinalysis with small amounts of leukoesterase, many bacteria.   CT abdomen with diffuse wall thickening of the ascending and transverse colon which may represent colitis, questionable rectal wall thickening which may represent proctitis, bladder wall thickening which may represent cystitis. The patient denies any abdominal pain or back pain. He denies any fevers or chills, or dysuria prior to admission. Blood cultures and urine culture have not come back positive for E. coli. He is currently on IV ceftriaxone. We have been consulted assist with the microbial therapy management    Current evaluation:2022    BP (!) 107/59   Pulse 85   Temp 96.8 °F (36 °C) (Temporal)   Resp 17   Ht 5' 9\" (1.753 m)   Wt 171 lb (77.6 kg)   SpO2 95%   BMI 25.25 kg/m²     Temperature Range: Temp: 96.8 °F (36 °C) Temp  Av.1 °F (36.2 °C)  Min: 96.8 °F (36 °C)  Max: 97.6 °F (36.4 °C)  The patient is seen and evaluated at bedside and is awake and alert in no acute distress. No subjective fevers or chills. No cough or shortness of breath. No dysuria or frequency. Review of Systems   Constitutional: Negative. HENT: Negative. Respiratory: Negative. Cardiovascular: Negative. Gastrointestinal: Negative. Genitourinary: Negative. Musculoskeletal: Negative. Neurological: Negative. Psychiatric/Behavioral: Negative. Physical Examination :     Physical Exam  Constitutional:       Appearance: He is well-developed. HENT:      Head: Normocephalic and atraumatic. Cardiovascular:      Rate and Rhythm: Normal rate. Heart sounds: Normal heart sounds. No friction rub. No gallop. Pulmonary:      Effort: Pulmonary effort is normal.      Breath sounds: Normal breath sounds. No wheezing. Abdominal:      General: Bowel sounds are normal.      Palpations: Abdomen is soft. There is no mass. Tenderness: There is no abdominal tenderness. Musculoskeletal:         General: Normal range of motion. Cervical back: Normal range of motion and neck supple. Lymphadenopathy:      Cervical: No cervical adenopathy. Skin:     General: Skin is warm and dry.    Neurological:      Mental Status: He is alert and oriented to person, place, and time. Laboratory data:   I have independently reviewed the followinglabs:  CBC with Differential:   Recent Labs     09/10/22  0343 09/11/22  0507   WBC 54.3* 31.8*   HGB 11.3* 11.3*   HCT 32.1* 31.4*   * 87*   LYMPHOPCT 2* 3*   MONOPCT 3 2       BMP:   Recent Labs     09/10/22  0343 09/10/22  1325 09/11/22  0507 09/12/22  0515   *   < > 129* 130*   K 4.3   < > 5.0 4.8   CL 91*   < > 94* 99   CO2 26   < > 26 22   BUN 61*   < > 56* 52*   CREATININE 3.82*   < > 3.35* 3.17*   MG 1.8  --  1.8  --     < > = values in this interval not displayed. Hepatic Function Panel:   Recent Labs     09/11/22  0507 09/12/22  0515   PROT 5.5* 5.5*   LABALBU 1.7* 1.5*   BILIDIR  --  2.7*   IBILI  --  0.4   BILITOT 3.7* 3.1*   ALKPHOS 199* 237*   ALT 20 23   AST 42* 49*           No results found for: PROCAL  No results found for: CRP  No results found for: SEDRATE      No results found for: DDIMER  No results found for: FERRITIN  No results found for: LDH  No results found for: FIBRINOGEN    No results found for requested labs within last 30 days. No results found for: COVID19    No results for input(s): [facility] in the last 72 hours. Imaging Studies:   MRI OF THE ABDOMEN WITHOUT CONTRAST AND MRCP 9/12/2022 8:12 am  Impression   Motion limited study, limiting evaluation of the bile ducts. Ill-defined T2 hyperintense nodular foci are seen in the left hepatic lobe   and right hepatic lobe, not clearly simple cysts, presumably due to early   abscesses given the history of sepsis with metastatic disease considered a   less likely etiology. Cirrhosis and splenomegaly       Findings of fluid overload denoted by bilateral pleural effusions, body wall   anasarca, and abdominal and pelvic ascites       Cholelithiasis       ONE XRAY VIEW OF THE CHEST 9/9/2022 11:20 am  Impression   1.   Right internal jugular line terminates at the superior caval atrial junction. 2.  Shallow inflation with more prominent perihilar opacities. Similar   appearance of peribronchial wall thickening. While this in part may   represent atelectasis, developing edema should be considered. RIGHT UPPER QUADRANT ULTRASOUND 9/9/2022 6:58 am  Cultures:     Culture, Urine [[id number]] (Abnormal)  Collected: 09/08/22 1744   Order Status: Completed Specimen: Urine, clean catch Updated: 09/10/22 1310    Specimen Description . CLEAN CATCH URINE    Culture ESCHERICHIA COLI >[id number] CFU/ML Abnormal      Escherichia coli (1)    Antibiotic Interpretation Microscan Method Status    ampicillin Sensitive 4 BACTERIAL SUSCEPTIBILITY PANEL SANDRA Final    aztreonam Sensitive <=1 BACTERIAL SUSCEPTIBILITY PANEL SANDRA Final    ceFAZolin Sensitive <=4 BACTERIAL SUSCEPTIBILITY PANEL SANDRA Final     Cefazolin sensitivity results can be used to predict the effectiveness of oral   cephalosporins (eg. Cephalexin) in uncomplicated Urinary Tract Infections due to E. coli, K.    pneumoniae, and P. mirabilis        cefTRIAXone Sensitive <=1 BACTERIAL SUSCEPTIBILITY PANEL SANDRA Final    ciprofloxacin Sensitive <=0.25 BACTERIAL SUSCEPTIBILITY PANEL SANDRA Final    Confirmatory Extended Spectrum Beta-Lactamase Negative NEGATIVE BACTERIAL SUSCEPTIBILITY PANEL SANDRA Final    gentamicin Sensitive <=1 BACTERIAL SUSCEPTIBILITY PANEL SANDRA Final    nitrofurantoin Sensitive <=16 BACTERIAL SUSCEPTIBILITY PANEL SANDRA Final    tobramycin Sensitive <=1 BACTERIAL SUSCEPTIBILITY PANEL SANDRA Final    trimethoprim-sulfamethoxazole Sensitive <=20 BACTERIAL SUSCEPTIBILITY PANEL SANDRA Final    piperacillin-tazobactam Sensitive <=4 BACTERIAL SUSCEPTIBILITY PANEL SANDRA Final          Specimen Collected: 09/08/22 17:44 EDT Last Resulted: 09/10/22 13:10 EDT        Culture, Blood 1 [[id number]] (Abnormal) Collected: 09/08/22 1410   Order Status: Completed Specimen: Blood Updated: 09/10/22 0808    Specimen Description . BLOOD    Special Requests LFA 12ML Culture POSITIVE Blood Culture Abnormal      DIRECT GRAM STAIN FROM BOTTLE: GRAM NEGATIVE RODS     ESCHERICHIA COLI For susceptibility, refer to previous culture. Abnormal      (NOTE) Direct Gram Stain from bottle result called to and read back by:JORDAN ORTIZ 9/9/22 @0420   Culture, Blood 1 [[id number]] (Abnormal)  Collected: 09/08/22 1419   Order Status: Completed Specimen: Blood Updated: 09/10/22 0807    Specimen Description . BLOOD    Special Requests RFA 12ML    Culture POSITIVE Blood Culture Abnormal      DIRECT GRAM STAIN FROM BOTTLE: GRAM NEGATIVE RODS     Filmarray BCID Detected: Escherichia coli Detected: Methodology- Polymerase Chain Reaction (PCR)     ESCHERICHIA COLI Abnormal      (NOTE) Direct Gram Stain from bottle and Polymerase Chain Reaction (PCR) results called to and read back by:JORDAN ORTIZ 9/9/22 @0420     Escherichia coli (4)    Antibiotic Interpretation Microscan Method Status    ampicillin Sensitive 4 BACTERIAL SUSCEPTIBILITY PANEL SANDRA Final    aztreonam Sensitive <=1 BACTERIAL SUSCEPTIBILITY PANEL SANDRA Final    ceFAZolin Sensitive <=4 BACTERIAL SUSCEPTIBILITY PANEL SANDRA Final    cefTRIAXone Sensitive <=1 BACTERIAL SUSCEPTIBILITY PANEL SANDRA Final    ciprofloxacin Sensitive <=0.25 BACTERIAL SUSCEPTIBILITY PANEL SANDRA Final    Confirmatory Extended Spectrum Beta-Lactamase Negative NEGATIVE BACTERIAL SUSCEPTIBILITY PANEL SANDRA Final    gentamicin Sensitive <=1 BACTERIAL SUSCEPTIBILITY PANEL SANDRA Final    tobramycin Sensitive <=1 BACTERIAL SUSCEPTIBILITY PANEL SANDRA Final    trimethoprim-sulfamethoxazole Sensitive <=20 BACTERIAL SUSCEPTIBILITY PANEL SANDRA Final    piperacillin-tazobactam Sensitive <=4 BACTERIAL SUSCEPTIBILITY PANEL SANDRA Final          Specimen Collected: 09/08/22 14:19 EDT Last Resulted: 09/10/22 08:07 EDT        Medications:      cefTRIAXone (ROCEPHIN) IV  1,000 mg IntraVENous Q24H    midodrine  10 mg Oral TID WC    pantoprazole (PROTONIX) 40 mg injection  40 mg IntraVENous Daily    sodium chloride flush  5-40 mL IntraVENous 2 times per day           [facility]  Erin Espinoza MD  [employer] messaging  OFFICE: [phone]      Electronically signed by Erin Espinoza MD on 9/12/2022 at 9:13 AM  Thank you for allowing us to participate in the care of this patient. Please call with questions. This note iscreated with the assistance of a speech recognition program.  While intending to generate a document that actually reflects the content of the visit, the document can still have some errors including those of syntax andsound a like substitutions which may escape proof reading. In such instances, actual meaning can be extrapolated by contextual diversion.

## 2022-09-12 NOTE — CARE COORDINATION
Social Work-Phone call to wife regarding dc plans. Discussed that patient is max assist of 2 to transfer. Discussed preference of SNF. Patient was at [facility] in the past and she would like patient to rehab there.  Sent referral. Karla Gamino

## 2022-09-12 NOTE — PROGRESS NOTES
Patient's blood sugar is 67, orders placed for a diet order and patient given apple juice. Will recheck blood sugar.

## 2022-09-12 NOTE — PROGRESS NOTES
Pharmacy Communication- Medication Route Change    Pantoprazole 40 mg daily changed from IV to PO per [facility] approved policy. Basic Criteria (please refer to hospital policy for details):  1. functioning GI tract  2. tolerating PO/NG routine medications    Thank you.   Ela Casas, [facility] 9/12/2022 10:44 AM

## 2022-09-12 NOTE — PROGRESS NOTES
Patient had a uneventful night. He remained on bedrest throughout the shift. He went on NPO at midnight for MRCP in the morning. His vitals remained stable. Did not complain of pain or discomfort during the shift. Due to previous low blood sugars, completed checked throughout the night and patient's blood sugar remained stable.

## 2022-09-12 NOTE — PROGRESS NOTES
or rhonchi, normal air movement, no respiratory distress  Cardiovascular: normal rate, regular rhythm, normal S1 and S2, no murmurs, rubs, clicks or gallops, distal pulses intact, no carotid bruits  Abdomen: soft, non-tender, non-distended, normal bowel sounds, no masses or organomegaly  Extremities: no cyanosis, clubbing or edema  Musculoskeletal: normal range of motion, no joint swelling, deformity or tenderness  Neurologic: no cranial nerve deficit and muscle strength normal    Lab and Imaging Review     CBC  Recent Labs     09/09/22  1432 09/10/22  0343 09/11/22  0507   WBC 49.3* 54.3* 31.8*   HGB 10.6* 11.3* 11.3*   HCT 31.1* 32.1* 31.4*   MCV 90.1 85.8 84.9   * 105* 87*       BMP  Recent Labs     09/10/22  1325 09/11/22  0507 09/12/22  0515   * 129* 130*   K 4.4 5.0 4.8   CL 90* 94* 99   CO2 26 26 22   BUN 56* 56* 52*   CREATININE 3.38* 3.35* 3.17*   GLUCOSE 89 60* 85   CALCIUM 7.7* 7.8* 7.6*       LFTS  Recent Labs     09/10/22  0343 09/11/22  0507 09/12/22  0515   ALKPHOS 171* 199* 237*   ALT 23 20 23   AST 43* 42* 49*   PROT 5.7* 5.5* 5.5*   BILITOT 4.0* 3.7* 3.1*   BILIDIR  --   --  2.7*   LABALBU 1.8* 1.7* 1.5*       AMYLASE/LIPASE/AMMONIA  Recent Labs     09/09/22  1432   AMMONIA 31       PT/INR  Recent Labs     09/10/22  0343 09/11/22  0507 09/12/22  0515   PROTIME 20.3* 19.7* 18.6*   INR 1.7 1.7 1.6       FINDINGS:ct abd 9/8/22       Chest:       Suboptimal evaluation secondary to motion degradation. Chest Wall and Thoracic Inlet: No axillary or supraclavicular   lymphadenopathy. The thyroid gland appears within normal limits. Mediastinum and Tali: Hypodense blood pool. No mediastinal or hilar   lymphadenopathy. Normal caliber of the thoracic aorta. The heart is normal   in size. No pericardial effusion. Lungs: Mild bilateral lower lobe patchy opacities are favored to represent   atelectasis.   However, an early inflammatory/infectious process is not   excluded in the proper clinical setting. Pleura: No pleural effusion. No pneumothorax. Bones: No suspicious osseous findings. Ossification of the anterior   longitudinal ligament may be related to diffuse idiopathic skeletal   hyperostosis (DISH). Abdomen/Pelvis:       Organs: There is no acute abnormality of the liver, pancreas, spleen,   adrenals, or kidneys. Suspected cholelithiasis. Suboptimal evaluation of   the gallbladder fossa. Bilateral perinephric fat stranding present. GI/Bowel: Postsurgical changes compatible with gastric bypass. Bowel caliber   is normal.  Diffuse wall thickening of the ascending and transverse colon may   represent colitis. Questionable rectal wall thickening. There is no   evidence of acute appendicitis. Pelvis: Mild bladder wall thickening. Peritoneum/Retroperitoneum: There is no free air. Small amount of free fluid   present around the liver and spleen. No pathologically enlarged lymph nodes   identified. Scattered atherosclerotic calcifications. Bones/Soft Tissues: No suspicious osseous findings. Diffuse subcutaneous fat   stranding may related to fluid overload. Small fat containing umbilical   hernia. Multilevel lumbar spondylosis. Impression   1. Diffuse wall thickening of the ascending and transverse colon may   represent colitis. 2. Questionable rectal wall thickening may represent proctitis. 3. Suspected cholelithiasis. Suboptimal evaluation of gallbladder fossa. Can correlate with right upper quadrant ultrasound. 4. Mild bladder wall thickening may be related to underdistension versus   possible cystitis. Can correlate with urinalysis. 5. Small amount of free fluid present around the liver and spleen. 6. Mild bilateral lower lobe patchy opacities are favored to represent   atelectasis. However, an early inflammatory/infectious process is not   excluded in the proper clinical setting.    7. Diffuse subcutaneous fat stranding may related to fluid overload. 8. Hypodense blood pool, may be related to the anemia. ASSESSMENT/plan  Abdominal pain with colitis per imaging, ecoli sepsis, UTI elevated lft's.,   Await mrcp results  Abx per ID  Trend lft  Eventual colonoscopy when more stable to eval rectal wall thickening seen on CT     2. AMS improved s/p accidental overdose on melatonin    Time spent reviewing chart assessing pt and d/w attending md around 30 minutes    This plan was formulated in collaboration with Dr. Brittani Ervin .     Electronically signed by: DIONICIO Ontiveros CNP on 9/12/2022 at 7:51 AM

## 2022-09-12 NOTE — PROGRESS NOTES
[facility]  Office: [address], DO, Martha Karyna, DO, Claraannamaria Metcalf, DO, Sarah Clintonkirsten Gamboa, DO, Bryson Gavin MD, Alex Pa MD, Jessica Delgado MD, Hyun Farrar MD,  Braydon Back MD, Ren Mobley MD, Catalina Mobley, DO, Ignacio Carpenter MD,  Figueroa Madera MD, Kelsey Javed MD, Eleno Lechuga, DO, Franklin Salinas MD, Luke Montenegro MD, Yesy Vazquez MD, Anusha Slacido MD, Carito New MD, Maddy Chow MD, Korina Escalera, DO, Reena Ramirez MD, Dietrich Halsted, MD, Walker Ashton, CNP,  Vianey Posey, CNP, Leonard Whitney, CNP, Jazzy Boggs, CNP, Cici Quispe, PA-C, Ez Ortiz, [facility], Kelvin Aldana, CNP, Gris Rea, CNP, Dahlia Edwards, CNP, Sherwin Arce, CNP, Shahrzad Siddiqui, CNP, Geetha English, CNS, Agatha Zhao, [facility], Andrés Cintron, CNP, Marilyn Forrest, [facility], [facility]      Daily Progress Note     Admit Date: 9/8/2022  Bed/Room No.  1110/1110-01  Admitting Physician : Jessica Delgado MD  Code Status :Full Code  Hospital Day:  LOS: 4 days   Chief Complaint:     Chief Complaint   Patient presents with    Altered Mental Status     Principal Problem:    Acute kidney injury superimposed on CKD [facility])  Active Problems:    Hypoglycemia    Thrombocytopenia (HCC)    Leukocytosis    Hyponatremia    Hypokalemia    Hypotension    Colitis    Acute cystitis with hematuria    Proctitis    Cholelithiasis    Hypomagnesemia    Altered mental status    Elevated LFTs    Abdominal pain    Cirrhosis of liver without ascites ([facility] [address]    Bacteremia due to Escherichia coli    E. coli UTI (urinary tract infection)    CKD (chronic kidney disease)  Resolved Problems:    * No resolved hospital problems. *    Subjective : Interval History/Significant events :  09/12/22    Patient is alert and oriented , he is making urine , Medeiros in place . Breathing normal . Kidney functio slowly improving .  No fever   Vitals - Stable afebrile, stable blood pressure. Labs -serum sodium 129, BUN 56, creatinine 3.35. Low albumin 1.7, leukocytosis improving 31.8. Blood culture positive for E. coli, urine culture positive for E. coli. Nursing notes , Consults notes reviewed. Overnight events and updates discussed with Nursing staff . Background History:         Darby Cisneros is 80 y.o. male  Who was admitted to the hospital on 9/8/2022 for treatment of Acute kidney injury superimposed on CKD ([facility] [address] Patient was brought to emergency room by ambulance after his wife called for patients change in mental status. Apparently patient overdosed on his sleep medication. He took medication within intent to fall asleep faster. Patient also reported that he has known history of arthritis and has been having difficulty with his joints. He was not moving much for last few days. He reports prior history of morbid obesity and weighted > 350 pounds. He underwent bariatric surgery > 25 years ago. He has prior history of diabetes mellitus however is not on any medication. Patient denies any other past medical history. On chart review it appears that patient has been admitted to [facility] with acute kidney injury and had elevated creatinine up to 3.5. Patient is very hard of hearing , he lives with his wife of > 35 Yrs. Patient denies any history of diarrhea, vomiting, nausea, fever. He denies any urinary difficulties, nocturia, retention. Initial evaluation emergency room showed hypotension with blood pressure 88/66 mmHg, blood glucose was significantly low 30's on first contact with EMS and repeat 60 on arrival to emergency room after dextrose. Lab evaluation showed hyponatremia 127, BUN 70, creatinine 4.34, bicarb 17, lactic acid 2.7, magnesium 1.3, troponin 58, albumin 1.8, AST 47, ALT 24, bilirubin 4.9. Alcohol levels negative. Urine tox was negative for cannabinoids, cocaine, methadone, fentanyl, oxycodone, amphetamine.   Patient was found to have significant leukocytosis 49.3 with low PLT 96 .  CT abdomen pelvis showed diffuse wall thickening in ascending, transverse colon suggestive of colitis, bladder wall thickening possible cystitis, suboptimal evaluation of gallbladder fossa. Patient has large right upper quadrant open surgical scar in place. Unsure about gallbladder status. He also has large midline surgical scar which she reports that is from bariatric surgery. PMH:  Past Medical History:   Diagnosis Date    Chronic back pain     Cirrhosis of liver without ascites ([facility] [address] 9/11/2022    Diabetes mellitus ([facility] [address]       Allergies: Allergies   Allergen Reactions    Cefazolin Rash     maculopapular rash over his face, chest, torso, and back. Medications :  [START ON 9/13/2022] pantoprazole, 40 mg, Oral, QAM AC  cefTRIAXone (ROCEPHIN) IV, 1,000 mg, IntraVENous, Q24H  midodrine, 10 mg, Oral, TID WC  sodium chloride flush, 5-40 mL, IntraVENous, 2 times per day      Review of Systems   Review of Systems   Constitutional:  Negative for activity change, appetite change, fatigue, fever and unexpected weight change. HENT:  Negative for congestion, nosebleeds, rhinorrhea, sinus pressure, sneezing and voice change. Respiratory:  Negative for cough, choking, chest tightness, shortness of breath and wheezing. Cardiovascular:  Negative for chest pain, palpitations and leg swelling. Gastrointestinal:  Negative for abdominal pain, constipation, diarrhea, nausea and vomiting. Genitourinary:  Negative for difficulty urinating, dysuria, frequency, penile discharge and testicular pain. Musculoskeletal:  Positive for back pain. Skin:  Negative for rash. Neurological:  Negative for dizziness, weakness, light-headedness and headaches. Hematological:  Does not bruise/bleed easily. Psychiatric/Behavioral:  Negative for agitation, behavioral problems, confusion, self-injury, sleep disturbance and suicidal ideas.     Objective : Current Vitals : Temp: 96.9 °F (36.1 °C),  Heart Rate: 76, Resp: 14, BP: 127/79, SpO2: 91 %  Last 24 Hrs Vitals   Patient Vitals for the past 24 hrs:   BP Temp Temp src Pulse Resp SpO2   09/12/22 1600 127/79 -- -- 76 14 91 %   09/12/22 1500 127/67 -- -- 76 12 95 %   09/12/22 1400 -- -- -- -- -- 94 %   09/12/22 1300 119/76 -- -- 82 14 94 %   09/12/22 1200 (!) 106/52 96.9 °F (36.1 °C) Temporal 75 13 95 %   09/12/22 1100 105/73 -- -- 96 22 94 %   09/12/22 1000 (!) 108/57 -- -- 85 15 --   09/12/22 0900 (!) 94/58 -- -- 85 21 --   09/12/22 0800 112/73 97 °F (36.1 °C) Temporal 90 17 95 %   09/12/22 0700 (!) 107/59 -- -- 89 17 95 %   09/12/22 0630 (!) 107/59 -- -- 85 17 95 %   09/12/22 0615 (!) 115/57 -- -- 89 18 94 %   09/12/22 0600 (!) 105/55 -- -- 87 17 95 %   09/12/22 0545 109/64 -- -- 83 16 96 %   09/12/22 0530 (!) 102/57 -- -- 82 15 95 %   09/12/22 0515 (!) 101/57 -- -- 86 12 95 %   09/12/22 0500 (!) 109/56 -- -- 83 16 94 %   09/12/22 0445 (!) 108/57 -- -- 86 17 94 %   09/12/22 0430 (!) 104/53 -- -- 86 15 96 %   09/12/22 0415 (!) 103/57 -- -- 86 16 94 %   09/12/22 0400 108/62 -- -- 84 16 95 %   09/12/22 0358 111/64 96.8 °F (36 °C) Temporal 85 17 94 %   09/12/22 0345 (!) 111/53 -- -- 86 15 95 %   09/12/22 0330 99/61 -- -- 85 15 95 %   09/12/22 0315 101/67 -- -- 97 20 96 %   09/12/22 0300 119/81 -- -- (!) 101 21 95 %   09/12/22 0245 (!) 101/56 -- -- 85 15 95 %   09/12/22 0230 (!) 111/56 -- -- 86 17 95 %   09/12/22 0215 (!) 103/59 -- -- 84 16 96 %   09/12/22 0200 (!) 102/58 -- -- 90 18 95 %   09/12/22 0145 (!) 103/56 -- -- 83 15 96 %   09/12/22 0130 108/69 -- -- (!) 101 19 95 %   09/12/22 0115 (!) 100/56 -- -- 84 16 96 %   09/12/22 0100 119/76 -- -- 97 29 94 %   09/12/22 0045 123/62 -- -- 83 15 96 %   09/12/22 0030 (!) 110/55 -- -- 84 14 96 %   09/12/22 0015 (!) 109/58 -- -- 84 13 96 %   09/12/22 0000 (!) 109/56 97.6 °F (36.4 °C) Temporal 91 18 97 %   09/11/22 2345 (!) 109/57 -- -- 85 14 96 %   09/11/22 2330 (!) 108/55 -- -- 85 15 96 %   09/11/22 2315 113/60 -- -- 83 16 97 %   09/11/22 2300 104/61 -- -- 83 14 96 %   09/11/22 2245 (!) 117/57 -- -- 84 12 95 %   09/11/22 2230 102/61 -- -- 84 14 97 %   09/11/22 2215 112/63 -- -- 90 19 96 %   09/11/22 2200 122/61 -- -- 88 16 96 %   09/11/22 2145 (!) 113/58 -- -- 85 16 95 %   09/11/22 2130 98/73 -- -- 91 19 91 %   09/11/22 2115 108/66 -- -- 85 15 97 %   09/11/22 2100 118/64 -- -- 87 14 98 %   09/11/22 2045 108/63 -- -- 85 15 98 %   09/11/22 2030 (!) 105/56 -- -- 86 14 96 %   09/11/22 2015 105/61 -- -- 94 22 94 %   09/11/22 2000 (!) 99/57 -- -- 91 16 97 %   09/11/22 1945 106/65 -- -- 85 17 96 %   09/11/22 1930 (!) 100/58 96.8 °F (36 °C) Temporal 88 15 93 %   09/11/22 1915 (!) 100/53 -- -- 80 14 98 %   09/11/22 1900 (!) 97/44 -- -- 83 15 97 %   09/11/22 1830 (!) 89/51 97.1 °F (36.2 °C) Infrared 88 22 94 %   09/11/22 1800 110/72 -- -- 84 21 96 %   09/11/22 1730 -- -- -- 80 14 97 %   09/11/22 1700 -- -- -- 79 16 96 %       Intake / output   09/10 1901 - 09/12 0700  In: 2348.2 [I.V.:2298.2]  Out: 3797 [Urine:1125]  Physical Exam:  Physical Exam  Vitals and nursing note reviewed. Constitutional:       General: He is not in acute distress. Appearance: He is not diaphoretic. HENT:      Head: Normocephalic and atraumatic. Nose:      Right Sinus: No maxillary sinus tenderness or frontal sinus tenderness. Left Sinus: No maxillary sinus tenderness or frontal sinus tenderness. Mouth/Throat:      Pharynx: No oropharyngeal exudate. Eyes:      General: No scleral icterus. Conjunctiva/sclera: Conjunctivae normal.      Pupils: Pupils are equal, round, and reactive to light. Neck:      Thyroid: No thyromegaly. Vascular: No JVD. Cardiovascular:      Rate and Rhythm: Normal rate and regular rhythm. Pulses:           Dorsalis pedis pulses are 2+ on the right side and 2+ on the left side. Heart sounds: Normal heart sounds. No murmur heard.   Pulmonary: Effort: Pulmonary effort is normal.      Breath sounds: Normal breath sounds. No wheezing or rales. Abdominal:      Palpations: Abdomen is soft. There is no mass. Tenderness: There is no abdominal tenderness. Comments: Surgical Scar    Musculoskeletal:      Cervical back: Full passive range of motion without pain and neck supple. Lymphadenopathy:      Head:      Right side of head: No submandibular adenopathy. Left side of head: No submandibular adenopathy. Cervical: No cervical adenopathy. Skin:     General: Skin is warm. Neurological:      Mental Status: He is alert and oriented to person, place, and time. Motor: No tremor. Psychiatric:         Behavior: Behavior is cooperative.          Laboratory findings:    Recent Labs     09/10/22  0343 09/11/22  0507 09/12/22  0515   WBC 54.3* 31.8*  --    HGB 11.3* 11.3*  --    HCT 32.1* 31.4*  --    * 87*  --    INR 1.7 1.7 1.6       Recent Labs     09/10/22  0343 09/10/22  1325 09/11/22  0507 09/12/22  0515   * 126* 129* 130*   K 4.3 4.4 5.0 4.8   CL 91* 90* 94* 99   CO2 26 26 26 22   GLUCOSE 97 89 60* 85   BUN 61* 56* 56* 52*   CREATININE 3.82* 3.38* 3.35* 3.17*   MG 1.8  --  1.8  --    CALCIUM 7.8* 7.7* 7.8* 7.6*       Recent Labs     09/10/22  0343 09/11/22  0507 09/12/22  0515   PROT 5.7* 5.5* 5.5*   LABALBU 1.8* 1.7* 1.5*   AST 43* 42* 49*   ALT 23 20 23   ALKPHOS 171* 199* 237*   BILITOT 4.0* 3.7* 3.1*   BILIDIR  --   --  2.7*            Specific Gravity, UA   Date Value Ref Range Status   09/08/2022 1.015 1.005 - 1.030 Final     Protein, UA   Date Value Ref Range Status   09/08/2022 1+ (A) NEGATIVE Final     RBC, UA   Date Value Ref Range Status   09/08/2022 20 TO 50 0 - 2 /HPF Final     Bacteria, UA   Date Value Ref Range Status   09/08/2022 MANY (A) None Final     Nitrite, Urine   Date Value Ref Range Status   09/08/2022 NEGATIVE NEGATIVE Final     WBC, UA   Date Value Ref Range Status   09/08/2022 5 TO 10 0 - 5 /HPF Final     Leukocyte Esterase, Urine   Date Value Ref Range Status   09/08/2022 SMALL (A) NEGATIVE Final       Imaging / Clinical Data :-   CT HEAD WO CONTRAST    Result Date: 9/8/2022  No acute intracranial abnormality. Prominent CSF spaces over the frontal and parietal lobes. No evidence of hydrocephalus. US GALLBLADDER RUQ    Result Date: 9/9/2022  1. Technically limited exam.  The gallbladder is not clearly identified. Based on recent CT exam, the gallbladder was relatively contracted with intermediate density fluid and stones. 2.  No evidence for biliary dilatation. 3.  Morphologic findings suggestive of cirrhosis with small perihepatic ascites and probable cyst.     XR CHEST PORTABLE    Result Date: 9/9/2022  1. Right internal jugular line terminates at the superior caval atrial junction. 2.  Shallow inflation with more prominent perihilar opacities. Similar appearance of peribronchial wall thickening. While this in part may represent atelectasis, developing edema should be considered. XR CHEST 1 VIEW    Result Date: 9/8/2022  Suboptimal evaluation secondary to patient positioning/rotation. No radiographic evidence of an acute cardiopulmonary process. US RETROPERITONEAL COMPLETE    Result Date: 9/8/2022  Unremarkable ultrasound of the kidneys and urinary bladder. CT CHEST ABDOMEN PELVIS WO CONTRAST    Result Date: 9/8/2022  1. Diffuse wall thickening of the ascending and transverse colon may represent colitis. 2. Questionable rectal wall thickening may represent proctitis. 3. Suspected cholelithiasis. Suboptimal evaluation of gallbladder fossa. Can correlate with right upper quadrant ultrasound. 4. Mild bladder wall thickening may be related to underdistension versus possible cystitis. Can correlate with urinalysis. 5. Small amount of free fluid present around the liver and spleen. 6. Mild bilateral lower lobe patchy opacities are favored to represent atelectasis.  However, an early inflammatory/infectious process is not excluded in the proper clinical setting. 7. Diffuse subcutaneous fat stranding may related to fluid overload. 8. Hypodense blood pool, may be related to the anemia. Clinical Course : stable  Assessment and Plan  :        Acute renal failure likely has underlying chronic kidney disease stage III. Unsure of his  baseline likely 1.5-2.0.  treated with IV fluids with bicarb infusion, maintain Medeiros catheter. Kidney ultrasound does not show any evidence of medical renal disease. Likely has ischemic ATN from hypotension and dehydration. continue urinary catheter. E. coli bacteremia with sepsis secondary to UTI- Rocephin 2 Gm daily . Underlying type 2 diabetes mellitus with hypoglycemia - Not on medications. Acute Cystitis - On antibiotics   Drug overdose-accidental -   Severe hypoglycemia -dextrose infusion, monitor blood sugars every 2 Hrs for now . Moderate malnutrition -     Awaiting return of kidney function. Baseline ~ 2.0     Maintaining his BP with MAP > 60 mmHg. Continue to monitor vitals , Intake / output ,  Cell count , HGB , Kidney function, oxygenation  as indicated . Plan and updates discussed with patient ,  answers  explained to satisfaction.    Plan discussed with Staff  RN     (Please note that portions of this note were completed with a voice recognition program. Efforts were made to edit the dictations but occasionally words are mis-transcribed.)      Leana Padilla MD  9/12/2022

## 2022-09-12 NOTE — PROGRESS NOTES
Occupational Therapy  Facility/Department: [facility]  Occupational Therapy Initial Assessment    Name: Sherron Gallegos  : 1935  MRN: [id number]  Date of Service: 2022    RN reports patient is medically stable for therapy treatment this date. Chart reviewed prior to treatment and patient is agreeable for therapy. All lines intact and patient positioned comfortably at end of treatment. All patient needs addressed prior to ending therapy session. Pt currently functioning below baseline. Recommend daily inpatient skilled therapy at time of discharge to maximize long term outcomes and prevent re-admission. Please refer to AM-PAC score for current level of function. Discharge Recommendations:  Patient would benefit from continued therapy after discharge  OT Equipment Recommendations  Equipment Needed:  (CTA)       Patient Diagnosis(es): The primary encounter diagnosis was Altered mental status, unspecified altered mental status type. Diagnoses of Hypoglycemia, Hypocalcemia, Hypokalemia, Hypomagnesemia, Hyponatremia, Acute kidney injury superimposed on CKD ([address] Lactic acidosis, Acute cystitis with hematuria, Colitis, Proctitis, Calculus of gallbladder without cholecystitis without obstruction, and Septic shock ([address] were also pertinent to this visit. 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 Past Surgical History:  has a past surgical history that includes Bariatric Surgery. PER H&P:    Sherron Gallegos is a 80 y.o. male who has a past medical history of diabetes who presents to the hospital via EMS for altered mental status. Patient was found to have a blood sugar in the 30s and was given amp of dextrose by EMS. Recheck in the emergency department was in the 60s requiring another dose.   Patient is oriented to the fact that he is in the hospital and what month it is, however cannot answer any other questions for me. States that he lives at home with his wife and that is about it. Has no medical complaints and denies being in any pain      Assessment   Performance deficits / Impairments: Decreased functional mobility ; Decreased safe awareness;Decreased balance;Decreased coordination;Decreased ADL status; Decreased cognition;Decreased vision/visual deficit; Decreased posture;Decreased ROM; Decreased endurance;Decreased strength;Decreased high-level IADLs;Decreased fine motor control  Assessment: Pt is a high fall risk, has poor safety awareness in function, and requires assist of 2 when up and use of RW/sarah stedy lift. Skilled OT is indicated for increasing overall I/safety awareness with functional tasks. Prognosis: Fair  Decision Making: High Complexity  REQUIRES OT FOLLOW-UP: Yes  Activity Tolerance  Activity Tolerance: Patient limited by fatigue;Patient limited by pain;Treatment limited secondary to decreased cognition (pt with no c/o dizziness and BP taken WFL's)  Activity Tolerance Comments: poor; stand mikey with RW < 30 seconds and in sarah stedy as well; pt fatigues easily and O2 sats decreased to 88% with standing and back to 91% when in bed.   *pt needed max edu and encouragement to participate with therapy        Plan   Plan  Times per Week: 4-5x/week 1x/day as mikey  Current Treatment Recommendations: Strengthening, ROM, Balance training, Functional mobility training, Neuromuscular re-education, Cognitive reorientation, Pain management, Endurance training, Safety education & training, Positioning, Patient/Caregiver education & training, Equipment evaluation, education, & procurement, Home management training, Self-Care / ADL, Cognitive/Perceptual training, Coordination training     Restrictions  Restrictions/Precautions  Restrictions/Precautions: General Precautions, Fall Risk  Required Braces or Orthoses?: No  Position Activity Restriction  Other position/activity restrictions: BRAD WILKS, up as mikey per RN FELICIANO StylesO sips of water with meds, alarms    Subjective   General  Chart Reviewed: Yes  Patient assessed for rehabilitation services?: Yes  Family / Caregiver Present: No  Subjective  Subjective: Pt states he has no pain currently but has arthritis all over-RN informed     Social/Functional History  Social/Functional History  Lives With: Spouse  Type of Home: Apartment (facility)  Home Layout: One level  Home Access: Ramped entrance, Stairs to enter without rails  Entrance Stairs - Number of Steps: 4  Bathroom Shower/Tub: Tub/Shower unit  Bathroom Toilet: Handicap height  Bathroom Equipment: Grab bars in shower, Shower chair, Grab bars around toilet  Home Equipment: Electric scooter, Walker, rolling (uses RW in home, uses scooter in community)  Has the patient had two or more falls in the past year or any fall with injury in the past year?: Yes (slid out of bed ~1-2 weeks ago, pt reports 5 other recent falls \"legs get weak\" - states L leg sergio)  Receives Help From: Family (Pt reports 2 adult children in the area)  ADL Assistance: [address] [place] [address] Avenue: Independent  Homemaking Responsibilities: Yes  Ambulation Assistance: Independent  Transfer Assistance: Independent  Active : No  Patient's  Info: wife  Occupation: Retired  Type of Occupation: Assembly line, power train  Leisure & Hobbies: tv  Additional Comments: **Unsure of accuracy of social/functional history. Pt appears to be a questionable historian reporting inconsistences throughout. Objective           Observation/Palpation  Posture: Poor (forward flexed trunk, forward head, rounded shoulders with RW standing and in sarah stedy)  Observation: scattered bruising BUE, fragile skin  Edema: BUE  Safety Devices  Type of Devices: Bed alarm in place;Call light within reach; Left in bed;Gait belt;Nurse notified; Heels elevated for pressure relief  Restraints  Restraints Initially in Place: No    Bed Mobility Training  Bed Mobility Training: Yes  Overall Level of Assistance: Maximum assistance;Assist X2;Additional time  Interventions: Verbal cues; Tactile cues; Safety awareness training;Weight shifting training(MAX cues/tactile assist for hand placement on bed rail, pursed lip breathing, initiating movements and proper log rolling tech, use of BUE's as able to assist with upright position/full scoot to EOB and place BLE's on floor and awareness/assist with lines to increase overall safety)  Rolling: Maximum assistance;Assist X2;Additional time  Supine to Sit: Maximum assistance;Assist X2;Additional time  Sit to Supine: Assist X2;Maximum assistance (pt requested back to bed and not safe to be up in chair-RN was informed)  Scooting: Maximum assistance;Assist X2;Additional time    Balance  Sitting:  (MOD to Min assist EOB)  Standing:  (MAX asssist of 2 with RW and in sarah stedy)  Transfer Training  Transfer Training: Yes  Overall Level of Assistance: Total assistance (DEP with use of sarah stedy and max assist of 2 staff for repositioning pt up in bed only)  Interventions: Demonstration;Verbal cues; Safety awareness training;Weight shifting training; Tactile cues (MAX cues/tactile assist and demo for B hand placement pushing from surface seated on as well as reaching back/B and placement on sarah stedy handle, assist with proper position of BLE's on platform of sarah stedy, nose over toes tech, pacing, pursed lip breathing, upright posture/weight shifting, controlled stand to sits and awareness/assist with lines to increase overall safety)  Sit to Stand: Maximum assistance;Assist X2;Additional time (sit to stand x2 with RW and in sarah stedy from EOB)  Stand to Sit: Maximum assistance;Assist X2;Additional time      Functional mob   Overall Level of Assistance:  (N/T and not safe or appropriate; sarah stedy used for repositioning pt up in bed only)     AROM: Generally decreased, functional (R shld AROM for flexion approx 40 degrees/PROM WFLS and rest WFL's; LUE WFLS)  Strength: Generally decreased, functional  Coordination: Generally decreased, functional (slow opposition B hands)  Tone: Normal  Sensation: Intact (no numbness or tingling complaints)    ADL  Feeding: NPO  Grooming: Setup; Moderate assistance (in seated position)  UE Bathing: Setup; Moderate assistance  LE Bathing: Setup;Dependent/Total  UE Dressing: Setup; Moderate assistance (with hosp gown)  LE Dressing: Setup;Maximum assistance;Dependent/Total (MAX assist with B socks supine in bed; x2 staff to stand with RW or in [address] [place] [address] for clothing/brief mgt)  Toileting: Dependent/Total (RN just pulled blanca out in middle of session; x2 staff to stand for any brief/clothing mgt with RW and in [facility])  Additional Comments: *pt is DEP with 2 staff assist when up with RW/sarah stedy for safety/balance support in function. Vision  Vision: Impaired (denies acute visual changes or previous eye sx)  Vision Exceptions: Wears glasses for reading  Hearing  Hearing: Exceptions to [facility]  Hearing Exceptions: Hard of hearing/hearing concerns; No hearing aid  Cognition  Overall Cognitive Status: Exceptions  Arousal/Alertness: Appropriate responses to stimuli  Following Commands: Follows one step commands with repetition; Follows one step commands with increased time; Inconsistently follows commands  Attention Span: Attends with cues to redirect  Memory: Decreased short term memory;Decreased recall of recent events;Decreased long term memory  Safety Judgement: Decreased awareness of need for assistance;Decreased awareness of need for safety  Problem Solving: Decreased awareness of errors;Assistance required to identify errors made;Assistance required to generate solutions;Assistance required to implement solutions;Assistance required to correct errors made  Insights: Not aware of deficits  Initiation: Requires cues for some  Sequencing: Requires cues for some  Cognition Comment: Pt demonstrating impulsive behaviors and poor safety awareness throughout session w/ no regard for lines or equipment requiring MAX verbal cueing and education throughout w/ poor return demo. Orientation  Overall Orientation Status: Impaired  Orientation Level: Disoriented to situation;Oriented to place;Oriented to time;Oriented to person                  Education Given To: Patient  Education Provided: Role of Therapy; ADL Adaptive Strategies;Orientation; Fall Prevention Strategies; Plan of Care;Transfer Training;Energy Conservation  Education Provided Comments: safety in function, call light use, benefits of therapy participation and being up OOB as able, recommendations for continued therapy, postual control, proper bed mob tech, pursed lip breathing  Education Method: Demonstration;Verbal  Barriers to Learning: Cognition; Hearing  Education Outcome: Continued education needed                      Co-treatment with PT warranted secondary to decreased safety and independence requiring 2 skilled therapy professionals to address individual discipline's goals. OT addressing preparation for ADL transfer, sitting and standing balance for increased ADL performance, sitting/activity tolerance, functional reaching, environmental safety/scanning, fall prevention, functional mobility for ADL transfers, ability to sequence and follow directions, bed mobility tech, and functional UE strength. AM-PAC Score-10                  Goals  Short Term Goals  Time Frame for Short term goals: by discharge, pt to demo  Short Term Goal 1: bed mob tasks with use of rail as needed to max assist of 1. Short Term Goal 2: UB ADL to min assist/set up and LB ADL to max assist of 1 supine in bed with use of rail/AE as able. Short Term Goal 3: ADL transfers with use of lift/AD to max assist of 1(add functional mob goal to POC as appropriate).   Short Term Goal 4: increase in R shld AROM by 10-15 degrees/increase BUE strength by a 1/2 grade to assist with ADL tasks. Short Term Goal 5: sitting balance to SBA and mikey > 20 mins as able to reduce falls/increase overall act mikey. Long Term Goals  Long Term Goal 1: Pt to stand with mod assist of 1 and AD mikey > 4 mins as able to reduce falls with ADL's. Long Term Goal 2: Caregivers to be I with pressure relief, BUE HEP, EC/WS and fall prevention tech, and DME/AD and AE recommendations with use of handouts. Long Term Goal 3: Pt to complete toileting tasks with use of BSC/AD and grab bar as needed to mod assist of 1. Patient Goals   Patient goals : Pt states he just wants to take a nap!        Therapy Time   Individual Concurrent Group Co-treatment   Time In 1030 (plus 10 min chart review/nursing communication)         Time Out 1119         Minutes 49=59 mins total          Timed Code Treatment Minutes: BHUPENDRA Martin

## 2022-09-12 NOTE — PROGRESS NOTES
Pulmonary Critical Care Progress Note    Patient seen for the follow up of Acute kidney injury superimposed on CKD ([address]     Subjective:    He has been bed ridden. He is on room air. He denies shortness of breath or. Chest pain. No syncope. MRCP was done. Medeiros was removed per nephrology request.  Examination:    Vitals: /67   Pulse 76   Temp 96.9 °F (36.1 °C) (Temporal)   Resp 12   Ht 5' 9\" (1.753 m)   Wt 171 lb (77.6 kg)   SpO2 95%   BMI 25.25 kg/m²   SpO2  Av.4 %  Min: 91 %  Max: 98 %  General appearance: alert and cooperative with exam  Neck: No JVD  Lungs: decreased breath sounds no crackles  Heart: regular rate and rhythm, S1, S2 normal, no gallop  Abdomen: Soft, non tender, + BS  Extremities: no cyanosis or clubbing. No significant edema    LABs:    CBC:   Recent Labs     09/10/22  0343 22  0507   WBC 54.3* 31.8*   HGB 11.3* 11.3*   HCT 32.1* 31.4*   * 87*       BMP:   Recent Labs     22  0507 22  0515   * 130*   K 5.0 4.8   CO2 26 22   BUN 56* 52*   CREATININE 3.35* 3.17*   LABGLOM 18* 19*   GLUCOSE 60* 85       PT/INR:   Recent Labs     22  0507 22  0515   PROTIME 19.7* 18.6*   INR 1.7 1.6         LIVER PROFILE:  Recent Labs     22  0507 22  0515   AST 42* 49*   ALT 20 23   LABALBU 1.7* 1.5*         Radiology:  MRI abdomen  Motion limited study, limiting evaluation of the bile ducts. Ill-defined T2 hyperintense nodular foci are seen in the left hepatic lobe   and right hepatic lobe, not clearly simple cysts, presumably due to early   abscesses given the history of sepsis with metastatic disease considered a   less likely etiology. Cirrhosis and splenomegaly       Findings of fluid overload denoted by bilateral pleural effusions, body wall   anasarca, and abdominal and pelvic ascites       Cholelithiasis     CXR   1. Right internal jugular line terminates at the superior caval atrial   junction.        2.  Shallow inflation with more prominent perihilar opacities. Similar   appearance of peribronchial wall thickening. While this in part may   represent atelectasis, developing edema should be considered. Impression:  Sepsis/septic shock /urinary tract infection/  cholangitis  right hepatic lobe nodular foci presumably due to early abscesses  Bilateral pleural effusion/atelectasis   Acute renal failure /metabolic acidosis   hyponatremia  Liver cirrhosis   Change in mental status   Levophed subcutaneous infiltration    Recurrent hypoglycemia due to sepsis   Colitis on CT   diabetes    Recommendations:   Oxygen by nasal cannula   Incentive spirometer every hour awake   DuoNeb p.r.n. Rocephin per D consult for 6 weeks   GI consult   Monitor blood pressure off Levophed to map more than 65    /hr/ off Bicarb drip /monitor urine output creatinine and sodium   Monitor troponins   Monitor blood sugars   Peripheral blood  smear.    Nephrology on consult    Discussed with RN   Palliative care consult /code status  Bedrest  Okay for stepdown unit if no procedure planned   Peptic ulcer disease prophylaxis   DVT prophylaxis    Ubaldo Sood MD, MD, [facility]  Pulmonary Critical Care and Sleep Medicine,  [facility]  Cell: [phone]  Office: [phone]

## 2022-09-12 NOTE — CONSULTS
96.8 °F (36 °C)  TMax:   Temp (24hrs), Av.1 °F (36.2 °C), Min:96.8 °F (36 °C), Max:97.6 °F (36.4 °C)    Respirations:  Resp: 17  Pulse:   Heart Rate: 85  BP:    BP: (!) 107/59  BP Range: Systolic (31IHZ), TAD:058 , Min:89 , KNQ:978       Diastolic (06HAH), DMS:60, Min:44, Max:81      Physical Examination:     General:  AAO x 3, speaking in full sentences, no accessory muscle use. Appears jaundiced, has a good sense of humor. HEENT: Atraumatic, normocephalic, no throat congestion, moist mucosa. Eyes:   Pupils equal, round and reactive to light, EOMI. Neck:   No JVD, no thyromegaly, no lymphadenopathy. Chest:  Bilateral vesicular breath sounds, no rales or wheezes. Cardiac:  S1 S2 RR, systolic ejection murmur at the apex radiating to the base gallops or rubs, JVP not raised. Abdomen: Soft, non-tender, no masses or organomegaly, BS audible. :   No suprapubic or flank tenderness. Neuro:  AAO x 3, No FND. SKIN:  No rashes, good skin turgor. Extremities:  No edema, palpable peripheral pulses, no calf tenderness.     Labs:       Recent Labs     22  1432 09/10/22  0343 22  0507   WBC 49.3* 54.3* 31.8*   RBC 3.45* 3.74* 3.70*   HGB 10.6* 11.3* 11.3*   HCT 31.1* 32.1* 31.4*   MCV 90.1 85.8 84.9   MCH 30.7 30.2 30.5   MCHC 34.1 35.2* 36.0*   RDW 17.0* 16.2* 16.6*   * 105* 87*   MPV 11.1 10.0 10.5      BMP:   Recent Labs     09/10/22  1325 22  0507 22  0515   * 129* 130*   K 4.4 5.0 4.8   CL 90* 94* 99   CO2 26 26 22   BUN 56* 56* 52*   CREATININE 3.38* 3.35* 3.17*   GLUCOSE 89 60* 85   CALCIUM 7.7* 7.8* 7.6*      Phosphorus:     Recent Labs     22  1144   PHOS 3.9     Magnesium:    Recent Labs     09/10/22  0343 22  0507   MG 1.8 1.8     Albumin:    Recent Labs     09/10/22  0343 22  0507 22  0515   LABALBU 1.8* 1.7* 1.5*     BNP:    No results found for: BNP  YENNY:    No results found for: YENNY  SPEP:  Lab Results   Component Value Date/Time    PROT 5.5 09/12/2022 05:15 AM     UPEP:   No results found for: LABPE  C3:   No results found for: C3  C4:   No results found for: C4  MPO ANCA:   No results found for: MPO  PR3 ANCA:   No results found for: PR3  Anti-GBM:   No results found for: GBMABIGG  Hep BsAg:       No results found for: HEPBSAG  Hep C AB:        No results found for: HEPCAB    Urinalysis/Chemistries:      Lab Results   Component Value Date/Time    NITRU NEGATIVE 09/08/2022 03:33 PM    COLORU Dark Yellow 09/08/2022 03:33 PM    PHUR 5.5 09/08/2022 03:33 PM    WBCUA 5 TO 10 09/08/2022 03:33 PM    RBCUA 20 TO 50 09/08/2022 03:33 PM    MUCUS NOT REPORTED 07/31/2019 07:58 AM    TRICHOMONAS NOT REPORTED 07/31/2019 07:58 AM    YEAST NOT REPORTED 07/31/2019 07:58 AM    BACTERIA MANY 09/08/2022 03:33 PM    SPECGRAV 1.015 09/08/2022 03:33 PM    LEUKOCYTESUR SMALL 09/08/2022 03:33 PM    UROBILINOGEN Normal 09/08/2022 03:33 PM    BILIRUBINUR  09/08/2022 03:33 PM     Presumptive positive. Unable to confirm due to unavailability of reagent. GLUCOSEU NEGATIVE 09/08/2022 03:33 PM    KETUA TRACE 09/08/2022 03:33 PM    AMORPHOUS NOT REPORTED 07/31/2019 07:58 AM     Urine Sodium:     Lab Results   Component Value Date/Time    SIMONE <20 09/08/2022 06:40 PM     Urine Potassium:  No results found for: KUR  Urine Chloride:  No results found for: CLUR  Urine Osmolarity:   Lab Results   Component Value Date/Time    OSMOU 207 09/08/2022 06:40 PM     Urine Protein:   No components found for: TOTALPROTEIN, URINE   Urine Creatinine:     Lab Results   Component Value Date/Time    LABCREA 42.2 09/10/2022 01:30 PM     Urine Eosinophils:  No components found for: UEOS    Radiology:     CXR:     Assessment:     1. Acute Kidney Injury: Secondary to ischemic ATN from shock, low flow, decreased intake, baseline creatinine 1.8-2.0 peaked up to 4.3 now down to 3.1 nonoliguric resolving expected to improve further  2.   Chronic kidney disease stage IIIb-4 baseline 1.8-2.0 secondary diabetic nephrosclerosis follows up with Dr. Joan Pelaez. Ultrasound in the past has shown left kidney to be 10 cm right 8.2 cm.  3.  E. coli bacteremia on Rocephin  4.  E. coli UTI  5. Colitis and proctitis on imaging studies  6. Suspected obstructive jaundice based on lab work MRCP done today  7. Diastolic dysfunction  8. Mild aortic stenosis    Plan:   1. Continue normal saline at 50 mill an hour  2. Continue ProAmatine  3. Keep systolic pressure more than 110  4. Can discontinue Medeiros from nephrology standpoint  5. Await MRCP results to assess for cholangitis and obstructive jaundice  6. Advance diet as much as possible  7. We will follow with you    Nutrition   Please ensure that patient is on a renal diet/TF. Avoid nephrotoxic drugs/contrast exposure. We will continue to follow along with you.

## 2022-09-13 LAB
ABSOLUTE EOS #: 0.07 K/UL (ref 0–0.44)
ABSOLUTE IMMATURE GRANULOCYTE: 0.31 K/UL (ref 0–0.3)
ABSOLUTE LYMPH #: 1 K/UL (ref 1.1–3.7)
ABSOLUTE MONO #: 0.92 K/UL (ref 0.1–1.2)
AFP: 93.6 UG/L
ANION GAP SERPL CALCULATED.3IONS-SCNC: 10 MMOL/L (ref 9–17)
BASOPHILS # BLD: 0 % (ref 0–2)
BASOPHILS ABSOLUTE: 0.05 K/UL (ref 0–0.2)
BUN BLDV-MCNC: 50 MG/DL (ref 8–23)
BUN/CREAT BLD: 18 (ref 9–20)
CALCIUM SERPL-MCNC: 8.1 MG/DL (ref 8.6–10.4)
CHLORIDE BLD-SCNC: 99 MMOL/L (ref 98–107)
CO2: 22 MMOL/L (ref 20–31)
CREAT SERPL-MCNC: 2.74 MG/DL (ref 0.7–1.2)
EOSINOPHILS RELATIVE PERCENT: 1 % (ref 1–4)
GFR AFRICAN AMERICAN: 27 ML/MIN
GFR NON-AFRICAN AMERICAN: 22 ML/MIN
GFR SERPL CREATININE-BSD FRML MDRD: ABNORMAL ML/MIN/{1.73_M2}
GLUCOSE BLD-MCNC: 101 MG/DL (ref 75–110)
GLUCOSE BLD-MCNC: 108 MG/DL (ref 75–110)
GLUCOSE BLD-MCNC: 73 MG/DL (ref 75–110)
GLUCOSE BLD-MCNC: 85 MG/DL (ref 70–99)
GLUCOSE BLD-MCNC: 91 MG/DL (ref 75–110)
HAV IGM SER IA-ACNC: NONREACTIVE
HCT VFR BLD CALC: 30.6 % (ref 40.7–50.3)
HEMOGLOBIN: 10.7 G/DL (ref 13–17)
HEPATITIS B CORE IGM ANTIBODY: NONREACTIVE
HEPATITIS B SURFACE ANTIGEN: NONREACTIVE
HEPATITIS C ANTIBODY: NONREACTIVE
IMMATURE GRANULOCYTES: 2 %
LYMPHOCYTES # BLD: 7 % (ref 24–43)
MCH RBC QN AUTO: 30.1 PG (ref 25.2–33.5)
MCHC RBC AUTO-ENTMCNC: 35 G/DL (ref 28.4–34.8)
MCV RBC AUTO: 86.2 FL (ref 82.6–102.9)
MONOCYTES # BLD: 7 % (ref 3–12)
NRBC AUTOMATED: 0 PER 100 WBC
PATHOLOGIST REVIEW: NORMAL
PDW BLD-RTO: 17 % (ref 11.8–14.4)
PLATELET # BLD: 60 K/UL (ref 138–453)
PMV BLD AUTO: 8.8 FL (ref 8.1–13.5)
POTASSIUM SERPL-SCNC: 5.3 MMOL/L (ref 3.7–5.3)
RBC # BLD: 3.55 M/UL (ref 4.21–5.77)
RBC # BLD: ABNORMAL 10*6/UL
SEG NEUTROPHILS: 83 % (ref 36–65)
SEGMENTED NEUTROPHILS ABSOLUTE COUNT: 11.47 K/UL (ref 1.5–8.1)
SODIUM BLD-SCNC: 131 MMOL/L (ref 135–144)
WBC # BLD: 13.8 K/UL (ref 3.5–11.3)

## 2022-09-13 PROCEDURE — 6370000000 HC RX 637 (ALT 250 FOR IP): Performed by: INTERNAL MEDICINE

## 2022-09-13 PROCEDURE — 80074 ACUTE HEPATITIS PANEL: CPT

## 2022-09-13 PROCEDURE — 97530 THERAPEUTIC ACTIVITIES: CPT

## 2022-09-13 PROCEDURE — 2000000000 HC ICU R&B

## 2022-09-13 PROCEDURE — 97535 SELF CARE MNGMENT TRAINING: CPT

## 2022-09-13 PROCEDURE — 2580000003 HC RX 258: Performed by: NURSE PRACTITIONER

## 2022-09-13 PROCEDURE — 97112 NEUROMUSCULAR REEDUCATION: CPT

## 2022-09-13 PROCEDURE — 85025 COMPLETE CBC W/AUTO DIFF WBC: CPT

## 2022-09-13 PROCEDURE — 99232 SBSQ HOSP IP/OBS MODERATE 35: CPT | Performed by: INTERNAL MEDICINE

## 2022-09-13 PROCEDURE — 2580000003 HC RX 258: Performed by: INTERNAL MEDICINE

## 2022-09-13 PROCEDURE — 82947 ASSAY GLUCOSE BLOOD QUANT: CPT

## 2022-09-13 PROCEDURE — APPSS30 APP SPLIT SHARED TIME 16-30 MINUTES: Performed by: NURSE PRACTITIONER

## 2022-09-13 PROCEDURE — 80048 BASIC METABOLIC PNL TOTAL CA: CPT

## 2022-09-13 PROCEDURE — 6360000002 HC RX W HCPCS: Performed by: NURSE PRACTITIONER

## 2022-09-13 PROCEDURE — 36415 COLL VENOUS BLD VENIPUNCTURE: CPT

## 2022-09-13 PROCEDURE — 6370000000 HC RX 637 (ALT 250 FOR IP): Performed by: FAMILY MEDICINE

## 2022-09-13 PROCEDURE — 82105 ALPHA-FETOPROTEIN SERUM: CPT

## 2022-09-13 RX ADMIN — MIDODRINE HYDROCHLORIDE 10 MG: 10 TABLET ORAL at 09:04

## 2022-09-13 RX ADMIN — SODIUM CHLORIDE, PRESERVATIVE FREE 10 ML: 5 INJECTION INTRAVENOUS at 08:56

## 2022-09-13 RX ADMIN — MIDODRINE HYDROCHLORIDE 10 MG: 10 TABLET ORAL at 12:23

## 2022-09-13 RX ADMIN — SODIUM CHLORIDE: 9 INJECTION, SOLUTION INTRAVENOUS at 06:12

## 2022-09-13 RX ADMIN — SODIUM CHLORIDE, PRESERVATIVE FREE 10 ML: 5 INJECTION INTRAVENOUS at 20:00

## 2022-09-13 RX ADMIN — CEFTRIAXONE SODIUM 1000 MG: 1 INJECTION, POWDER, FOR SOLUTION INTRAMUSCULAR; INTRAVENOUS at 12:25

## 2022-09-13 RX ADMIN — PANTOPRAZOLE SODIUM 40 MG: 40 TABLET, DELAYED RELEASE ORAL at 06:10

## 2022-09-13 ASSESSMENT — ENCOUNTER SYMPTOMS
RESPIRATORY NEGATIVE: 1
GASTROINTESTINAL NEGATIVE: 1

## 2022-09-13 ASSESSMENT — PAIN SCALES - GENERAL: PAINLEVEL_OUTOF10: 0

## 2022-09-13 NOTE — PLAN OF CARE
Problem: Discharge Planning  Goal: Discharge to home or other facility with appropriate resources  9/13/2022 1114 by Cheyenne Thibodeaux RN  Outcome: Progressing     Problem: Pain  Goal: Verbalizes/displays adequate comfort level or baseline comfort level  9/13/2022 1114 by Cheyenne Thibodeaux RN  Outcome: Progressing     Problem: Safety - Adult  Goal: Free from fall injury  9/13/2022 1114 by Cheyenne Thibodeaux RN  Outcome: Progressing     Problem: Chronic Conditions and Co-morbidities  Goal: Patient's chronic conditions and co-morbidity symptoms are monitored and maintained or improved  9/13/2022 1114 by Cheyenne Thibodeaux RN  Outcome: Progressing     Problem: Respiratory - Adult  Goal: Achieves optimal ventilation and oxygenation  9/13/2022 1114 by Cheyenne Thibodeaux RN  Outcome: Progressing     Problem: Cardiovascular - Adult  Goal: Absence of cardiac dysrhythmias or at baseline  9/13/2022 1114 by Cheyenne Thibodeaux RN  Outcome: Progressing     Problem: Musculoskeletal - Adult  Goal: Return mobility to safest level of function  9/13/2022 1114 by Cheyenne Thibodeaux RN  Outcome: Progressing

## 2022-09-13 NOTE — PROGRESS NOTES
[facility]  Progress Note    Sarah Townsend  MRN: [id number]  Date: 9/13/2022  LOS: 5     Reason for F/U :   E. coli sepsis    Impression :   E. coli sepsis secondary to E. coli urinary tract infection  Left and right hepatic lobe nodular foci presumably due to early abscesses  Colitis by CT imaging  Leukocytosis  Acute kidney injury  Cirrhosis and splenomegaly  Diabetes mellitus type 2  Chronic back pain    Recommendations:   Continue intravenous antimicrobial therapy with Rocephin and the plan is to continue this for 6 weeks to treat for the potential hepatic abscesses  Infectious disease wise the patient is okay for discharge  The plan is for the patient to be discharged to a skilled nursing facility  He will need follow-up imaging of the liver either by CT or MRI again. Infection Control Recommendations:   Universal precautions    Discharge Planning:   Estimated Length of IV antimicrobials:6 Weeks  Patient will need Midline Catheter Insertion/ PICC line Insertion: No  Patient will need: Home IV , [place],  SNF,  LTAC: Undetermined  Patient willneed outpatient wound care: No    Medical Decision making / Summary of Stay:   Sarah Townsend is a 80y.o.-year-old male who was initially admitted on 9/8/2022. Patient has known medical history of chronic back pain and diabetes mellitus type 2. Patient was brought to the emergency department at the discretion of his wife for altered mental status. He apparently took too many sleeping pills, stating that he wanted to fall asleep extra fast.  Upon arrival, blood sugars were in the 30s he did receive dextrose. Urinalysis with small amounts of leukoesterase, many bacteria. CT abdomen with diffuse wall thickening of the ascending and transverse colon which may represent colitis, questionable rectal wall thickening which may represent proctitis, bladder wall thickening which may represent cystitis.   The patient denies any abdominal pain or back pain.  He denies any fevers or chills, or dysuria prior to admission. Blood cultures and urine culture have not come back positive for E. coli. He is currently on IV ceftriaxone. We have been consulted assist with the microbial therapy management    Current evaluation:2022    /62   Pulse 86   Temp 97.7 °F (36.5 °C) (Temporal)   Resp 14   Ht 5' 9\" (1.753 m)   Wt 171 lb (77.6 kg)   SpO2 95%   BMI 25.25 kg/m²     Temperature Range: Temp: 97.7 °F (36.5 °C) Temp  Av.4 °F (36.3 °C)  Min: 96.9 °F (36.1 °C)  Max: 97.8 °F (36.6 °C)  The patient is seen and evaluated at bedside he is awake and alert little bit grumpy today. He reports that he is tired and has been bothered all night has not been able to get any rest.  He does not really report any other subjective complaints at this point in time. The care was discussed with the nurse who did report that his wife Leonila Vicente wanted me to call her to discuss the MRI imaging that was done yesterday. We did call the number and it went to voicemail which has not been set up so I could not leave a message. Review of Systems   Constitutional: Negative. HENT: Negative. Respiratory: Negative. Cardiovascular: Negative. Gastrointestinal: Negative. Genitourinary: Negative. Musculoskeletal: Negative. Neurological: Negative. Psychiatric/Behavioral: Negative. Physical Examination :     Physical Exam  Constitutional:       Appearance: He is well-developed. HENT:      Head: Normocephalic and atraumatic. Cardiovascular:      Rate and Rhythm: Normal rate. Heart sounds: Normal heart sounds. No friction rub. No gallop. Pulmonary:      Effort: Pulmonary effort is normal.      Breath sounds: Normal breath sounds. No wheezing. Abdominal:      General: Bowel sounds are normal.      Palpations: Abdomen is soft. There is no mass. Tenderness: There is no abdominal tenderness.    Musculoskeletal:         General: Normal range 9/9/2022 11:20 am  Impression   1. Right internal jugular line terminates at the superior caval atrial   junction. 2.  Shallow inflation with more prominent perihilar opacities. Similar   appearance of peribronchial wall thickening. While this in part may   represent atelectasis, developing edema should be considered. RIGHT UPPER QUADRANT ULTRASOUND 9/9/2022 6:58 am  Impression   1. Technically limited exam.  The gallbladder is not clearly identified. Based on recent CT exam, the gallbladder was relatively contracted with   intermediate density fluid and stones. 2.  No evidence for biliary dilatation. 3.  Morphologic findings suggestive of cirrhosis with small perihepatic   ascites and probable cyst.         Cultures:     Culture, Blood 1 [[id number]] Collected: 09/12/22 1125   Order Status: Completed Specimen: Blood Updated: 09/13/22 0223    Specimen Description . BLOOD    Special Requests RIGHT HAND, 3ML    Culture NO GROWTH 12 HOURS   Culture, Blood 1 [[id number]] Collected: 09/12/22 1028   Order Status: Completed Specimen: Blood Updated: 09/13/22 0031    Specimen Description . BLOOD    Special Requests LEFT HAND, 10ML    Culture NO GROWTH 12 HOURS     Culture, Urine [[id number]] (Abnormal)  Collected: 09/08/22 1744   Order Status: Completed Specimen: Urine, clean catch Updated: 09/10/22 1310    Specimen Description . CLEAN CATCH URINE    Culture ESCHERICHIA COLI >[id number] CFU/ML Abnormal      Escherichia coli (1)    Antibiotic Interpretation Microscan Method Status    ampicillin Sensitive 4 BACTERIAL SUSCEPTIBILITY PANEL SANDRA Final    aztreonam Sensitive <=1 BACTERIAL SUSCEPTIBILITY PANEL SANDRA Final    ceFAZolin Sensitive <=4 BACTERIAL SUSCEPTIBILITY PANEL SANDRA Final     Cefazolin sensitivity results can be used to predict the effectiveness of oral   cephalosporins (eg.  Cephalexin) in uncomplicated Urinary Tract Infections due to E. coli, K.    pneumoniae, and P. mirabilis        cefTRIAXone Sensitive <=1 BACTERIAL SUSCEPTIBILITY PANEL SANDRA Final    ciprofloxacin Sensitive <=0.25 BACTERIAL SUSCEPTIBILITY PANEL SANDRA Final    Confirmatory Extended Spectrum Beta-Lactamase Negative NEGATIVE BACTERIAL SUSCEPTIBILITY PANEL SANDRA Final    gentamicin Sensitive <=1 BACTERIAL SUSCEPTIBILITY PANEL SANDRA Final    nitrofurantoin Sensitive <=16 BACTERIAL SUSCEPTIBILITY PANEL SANDRA Final    tobramycin Sensitive <=1 BACTERIAL SUSCEPTIBILITY PANEL SANDRA Final    trimethoprim-sulfamethoxazole Sensitive <=20 BACTERIAL SUSCEPTIBILITY PANEL SANDRA Final    piperacillin-tazobactam Sensitive <=4 BACTERIAL SUSCEPTIBILITY PANEL SANDRA Final          Specimen Collected: 09/08/22 17:44 EDT Last Resulted: 09/10/22 13:10 EDT        Culture, Blood 1 [[id number]] (Abnormal) Collected: 09/08/22 1410   Order Status: Completed Specimen: Blood Updated: 09/10/22 0808    Specimen Description . BLOOD    Special Requests LFA 12ML    Culture POSITIVE Blood Culture Abnormal      DIRECT GRAM STAIN FROM BOTTLE: GRAM NEGATIVE RODS     ESCHERICHIA COLI For susceptibility, refer to previous culture. Abnormal      (NOTE) Direct Gram Stain from bottle result called to and read back by:JORDAN ORTIZ 9/9/22 @0420   Culture, Blood 1 [[id number]] (Abnormal)  Collected: 09/08/22 1419   Order Status: Completed Specimen: Blood Updated: 09/10/22 0807    Specimen Description . BLOOD    Special Requests RFA 12ML    Culture POSITIVE Blood Culture Abnormal      DIRECT GRAM STAIN FROM BOTTLE: GRAM NEGATIVE RODS     Filmarray BCID Detected: Escherichia coli Detected: Methodology- Polymerase Chain Reaction (PCR)     ESCHERICHIA COLI Abnormal      (NOTE) Direct Gram Stain from bottle and Polymerase Chain Reaction (PCR) results called to and read back by:JORDAN ORTIZ 9/9/22 @0420     Escherichia coli (4)    Antibiotic Interpretation Microscan Method Status    ampicillin Sensitive 4 BACTERIAL SUSCEPTIBILITY PANEL SANDRA Final    aztreonam Sensitive <=1 BACTERIAL SUSCEPTIBILITY PANEL SANDRA Final    ceFAZolin Sensitive <=4 BACTERIAL SUSCEPTIBILITY PANEL SANDRA Final    cefTRIAXone Sensitive <=1 BACTERIAL SUSCEPTIBILITY PANEL SANDRA Final    ciprofloxacin Sensitive <=0.25 BACTERIAL SUSCEPTIBILITY PANEL SANDRA Final    Confirmatory Extended Spectrum Beta-Lactamase Negative NEGATIVE BACTERIAL SUSCEPTIBILITY PANEL SANDRA Final    gentamicin Sensitive <=1 BACTERIAL SUSCEPTIBILITY PANEL SANDRA Final    tobramycin Sensitive <=1 BACTERIAL SUSCEPTIBILITY PANEL SANDRA Final    trimethoprim-sulfamethoxazole Sensitive <=20 BACTERIAL SUSCEPTIBILITY PANEL SANDRA Final    piperacillin-tazobactam Sensitive <=4 BACTERIAL SUSCEPTIBILITY PANEL SANDRA Final          Specimen Collected: 09/08/22 14:19 EDT Last Resulted: 09/10/22 08:07 EDT        Medications:      pantoprazole  40 mg Oral QAM AC    cefTRIAXone (ROCEPHIN) IV  1,000 mg IntraVENous Q24H    midodrine  10 mg Oral TID WC    sodium chloride flush  5-40 mL IntraVENous 2 times per day           [facility]  Jaimie Young MD  [employer]  OFFICE: [phone]      Electronically signed by Jaimie Young MD on 9/13/2022 at 11:19 AM  Thank you for allowing us to participate in the care of this patient. Please call with questions. This note iscreated with the assistance of a speech recognition program.  While intending to generate a document that actually reflects the content of the visit, the document can still have some errors including those of syntax andsound a like substitutions which may escape proof reading. In such instances, actual meaning can be extrapolated by contextual diversion.

## 2022-09-13 NOTE — PROGRESS NOTES
Physical Therapy  Facility/Department: [facility]  Rehabilitation Physical Therapy Treatment Note    NAME: Rukhsana Salcido  : 1935 (53 y.o.)  MRN: [id number]  CODE STATUS: Full Code    Date of Service: 22  RN oJse reports patient is medically stable for therapy treatment this date. Chart reviewed prior to treatment and patient is agreeable for therapy. All lines intact and patient positioned comfortably at end of treatment. All patient needs addressed prior to ending therapy session. Pt currently functioning below baseline. Recommend daily inpatient skilled therapy at time of discharge to maximize long term outcomes and prevent re-admission. Please refer to AM-PAC score for current level of function. AMPAC Score: 9    Restrictions:  Restrictions/Precautions: General Precautions; Fall Risk  Position Activity Restriction  Other position/activity restrictions: LUE IV, R jugular line, up w/ assist     SUBJECTIVE  Subjective  Subjective: RN and pt agreeable to therapy. Pt supine in bed upon arrival.  Pt requiring encouragement throughout. Pt initially stating \"the only movement I\"m doing is to nap. \"          OBJECTIVE  Cognition  Overall Cognitive Status: Exceptions  Arousal/Alertness: Appropriate responses to stimuli  Following Commands: Follows one step commands with repetition; Follows one step commands with increased time; Inconsistently follows commands  Attention Span: Attends with cues to redirect  Memory: Decreased short term memory;Decreased recall of recent events;Decreased long term memory  Safety Judgement: Decreased awareness of need for assistance;Decreased awareness of need for safety  Problem Solving: Decreased awareness of errors;Assistance required to identify errors made;Assistance required to generate solutions;Assistance required to implement solutions;Assistance required to correct errors made  Insights: Not aware of deficits  Initiation: Requires cues for some  Sequencing: Requires cues for some  Cognition Comment: Pt impulsive throughout session with poor direction follow/safety awareness. Orientation  Overall Orientation Status: Impaired  Orientation Level: Disoriented to situation;Oriented to place;Oriented to time;Oriented to person    Functional Mobility  Bed Mobility  Overall Assistance Level: Maximum Assistance; Requires x 2 Assistance  Additional Factors: Set-up; Verbal cues; Increased time to complete; Head of bed raised; With handrails  Sit to Supine  Assistance Level: Maximum assistance; Requires x 2 assistance  Supine to Sit  Assistance Level: Maximum assistance; Requires x 2 assistance  Skilled Clinical Factors: Pt w/ significant difficulty throughout bed mobility this date requiring increased time and effort to perform tasks throughout. Pt requiring max verbal and tactile cueing for initiation, progression, and sequencing of BLE & trunk throughout w/ fair return demo. Pt also requiring hand-over-hand cueing for use of bedrails as pt attempting to pull from writer instead w/ poor return demo. Upon sitting at EOB, pt w/ significant difficulty attempting to scoot forward in order to place B feet on floor requiring MaxA x 2 throughout. Pt denying any dizziness/lightheadedness throughout position changes. Pt's HR increased to 120s throughout sup>sit requiring ~2-3 minute rest break w/ pursed lip breathing to return to low 100s prior to initiating transfers. Scooting  Assistance Level: Maximum assistance; Requires x 2 assistance  Balance  Sitting Balance: Maximum assistance (x 1-2)  Standing Balance: Maximum assistance (x 2)  Transfers  Surface: To bed;From bed  Additional Factors: Set-up; Verbal cues; Hand placement cues; Increased time to complete  Device: Walker (sarah stedy)  Sit to Stand  Assistance Level: Maximum assistance; Requires x 2 assistance  Skilled Clinical Factors: Pt performed 7 STS transfers throughout session this date (3x RW, 4x sarah stedy) demonstrating poor steadiness throughout. Pt requiring MAX verbal and tactile cueing for proper hand placement throughout transfers w/ RW & sarah stedy w/ poor return demo. Pt initially standing w/ RW & demonstrating poor standing tolerance throughout, sitting w/o warning after ~15 seconds w/ each attempt. Pt w/ significantly forward flexed trunk requiring max verbal and tactile cueing for activation of hip extensors in order to correct posture w/ poor return demo. Throughout attempts at standing, pt began having bowel movement and was placed on bedside commode at bedside w/ 3rd person assist.  Pt then stood multiple times in [facility] in order for madiha care to be performed & linen to be changed w/ poor standing tolerance throughout, continuing to sit w/o warning. Stand to Sit  Assistance Level: Maximum assistance; Requires x 2 assistance  Skilled Clinical Factors: Pt w/ poor eccentric quad control throughout stand>sit transfers. Environmental Mobility  Ambulation  Activity Comments: Pt w/ poor standing tolerance this date, unable to maintain upright standing position long enough to attempt pre-gait activities. Will continue to progress as able. PT Exercises  Exercise Treatment: ankle pumps x 10, seated marches x 10, LAQ x 10, hip ABD x 10      ASSESSMENT/PROGRESS TOWARDS GOALS  Assessment  Assessment: Pt tolerated session poor. Pt continuing to present w/ significant strength, balance, and endurance deficits. Pt demonstrating self-limiting behaviors throughout. At this time, pt is a HIGH fall risk and requires skilled 2 person assist for safe functional mobility. Pt would benefit from continued skilled PT to address deficits in order to maximize independence w/ functional mobility. Activity Tolerance: Patient limited by endurance; Patient limited by fatigue  Discharge Recommendations: Patient would benefit from continued therapy after discharge    Goals  Patient Goals   Patient goals :  \"To take a nap\"  Short Term Goals  Time Frame for Short term goals: 12 visits  Short term goal 1: Pt to demonstrate bed mobility Karley  Short term goal 2: Pt to perform STS transfers w/ RW Chandana  Short term goal 3: Pt to ambulate at least 20ft w/ RW ModA  Short term goal 4: Pt to actively participate in at least 30 minutes of physical therapy for ther act, ther ex, balance, gait, and endurance training  Short term goal 5: Pt to be indep w/ pressure relief techniques in order to prevent pressure injuries    PLAN OF CARE/SAFETY  Plan  Plan: 5-7 times per week  Current Treatment Recommendations: Strengthening; Functional mobility training;Balance training;Transfer training;Neuromuscular re-education;Gait training; Endurance training;Pain management;Home exercise program;Safety education & training;Patient/Caregiver education & training;Equipment evaluation, education, & procurement; Therapeutic activities; Positioning;ADL/Self-care training  Safety Devices  Type of Devices: Bed alarm in place;Call light within reach; Left in bed;Gait belt;Nurse notified (PCT present in room w/ pt upon writer's exit. PCT denying any needs.)  Restraints  Restraints Initially in Place: No    EDUCATION  Education  Education Given To: Patient  Education Provided: Role of Therapy;Plan of Care;Safety; Energy Conservation; Fall Prevention Strategies;Transfer Training;Mobility Training;Equipment  Education Provided Comments: Writer reinforced education: importance of continued mobility throughout admission, general safety awareness, fall risk prevention, safe transfers w/ RW, safe transfers w/ sarah stedy, pursed lip breathing, activity tolerance, and PT POC. Pt w/ poor return demo. Pt requires continued reinforcement of education.   Education Method: Verbal;Demonstration  Barriers to Learning: Cognition  Education Outcome: Continued education needed      Therapy Time   Individual Concurrent Group Co-treatment   Time In 7641         Time Out 7283         [id number] Co-treatment with OT warranted secondary to decreased safety and independence requiring 2 skilled therapy professionals to address individual discipline's goals. PT addressing pre gait trunk strengthening, weight shifting prior to transfers, transfer training, and postural control in sitting.       Ryan Munson, PT, 09/13/22 at 1:57 PM

## 2022-09-13 NOTE — PROGRESS NOTES
Occupational Therapy  Facility/Department: [facility] ICU  Rehabilitation Occupational Therapy Daily Treatment Note    Date: 22  Patient Name: Bud Lay       Room: 7923/6563-93  MRN: [id number]  Account: [de-identified]   : 1935  (80 y.o.) Gender: male         JORDAN Brewer reports patient is medically stable for therapy treatment this date. Chart reviewed prior to treatment and patient is agreeable for therapy. All lines intact and patient positioned comfortably at end of treatment. All patient needs addressed prior to ending therapy session. Past Medical History:  has a past medical history of Chronic back pain, Cirrhosis of liver without ascites ([facility] [address] and Diabetes mellitus ([facility] [address] Past Surgical History:   has a past surgical history that includes Bariatric Surgery. Restrictions  Restrictions/Precautions: General Precautions; Fall Risk  Other position/activity restrictions: LUE IV, R jugular line, up w/ assist  Required Braces or Orthoses?: No    Subjective  Subjective: \"The only moving I am doing is too sleep\" Pt resting in bed, agreeable to OT tx session with MAX encouragement  Restrictions/Precautions: General Precautions; Fall Risk             Objective     Cognition  Cognition Comment: Pt impulsive throughout session with poor direction follow/safety awareness. Orientation  Overall Orientation Status: Impaired         ADL  Upper Extremity Dressing  Assistance Level: Maximum assistance;Set-up (to tie hospital gown while seated on EOB)  Lower Extremity Dressing  Assistance Level: Dependent (for donning B socks while supine in bed)  Toileting  Assistance Level: Dependent  Skilled Clinical Factors: Pt required x3 assist to complete hygiene after BM sitting on EOB. Pt stood in [address] with MAX x2 assist while third staff memeber assist with hygiene. Pt agitated throughout.           Functional Mobility  Skilled Clinical Factors: Pt stood with RW and attempted functional mobility, pt unable to take steps at this time, abruptly sat down on EOB and stated \"I'm pooping\"      Bed Mobility  Overall Assistance Level: Maximum Assistance; Requires x 2 Assistance  Roll Right  Assistance Level: Requires x 2 assistance;Maximum assistance  Sit to Supine  Assistance Level: Requires x 2 assistance;Maximum assistance  Supine to Sit  Assistance Level: Requires x 2 assistance;Maximum assistance  Scooting  Assistance Level: Requires x 2 assistance;Maximum assistance  Skilled Clinical Factors: Pt completed bed mobility with significant difficulties this date. Pt required Max verbal cues for pacing self, use of bedrails, line mgmt, pursed lip breathing, initiation and sequencing. Once seated on EOB pt denied any dizziness. Sit to Stand  Assistance Level: Maximum assistance; Requires x 2 assistance  Stand to Sit  Assistance Level: Maximum assistance; Requires x 2 assistance  Skilled Clinical Factors: Pt completed multiple STS this date. First attempt with RW 2-3 stands with Max x2 assist. Once standing pt reporting he was having a BM and abruptly sat down, 2 more stand completed with RW to clean pt. Pt unable to stand for >25 seconds requiring multiple stands to complete hygiene. Cynthia Small then put in place pt completed 3-4 STS with Max x2 while third staff member assist with posterior madiha hygiene. Pt becoming increasingly agitated attempting to lay down without bed sheets on the bed (attempting linen change while pt stood d/t BM on sheets). Once pt clean and linens changed pt assisted back to bed. Assessment  Assessment  Assessment: Pt would benefit from continued skilled OT services to increase I and safety during funcitonal tasks to reduce caregiver burden as able. Activity Tolerance: Patient limited by endurance; Patient limited by fatigue  Discharge Recommendations: Patient would benefit from continued therapy after discharge  OT Equipment Recommendations  Equipment Needed: Yes (CTA)  Safety Devices  Safety Devices in place: Yes  Type of devices: Nurse notified;Gait belt;Call light within reach; Left in bed (PCT in room at end of session)    Patient Education  Education  Education Given To: Patient  Education Provided: Role of Therapy; ADL Function;Plan of Care;Family Education;Transfer Training; Safety; Energy Conservation; Fall Prevention Strategies  Education Provided Comments: safety in function, fall prevention/call light use, benefits of being oob, OT POC, role of OT in acute care, proper bed mobility tech, recommendations for continued therapy, benefits of therapy participation  Education Method: Verbal  Barriers to Learning: None  Education Outcome: Verbalized understanding    Plan  Plan  Times per Week: 4-5x/week 1x/day as mikey  Current Treatment Recommendations: Strengthening;ROM;Balance training;Functional mobility training;Neuromuscular re-education;Cognitive reorientation;Pain management; Endurance training; Safety education & training;Positioning;Patient/Caregiver education & training;Equipment evaluation, education, & procurement;Home management training;Self-Care / ADL;Cognitive/Perceptual training;Coordination training    Goals  Patient Goals   Patient goals : Pt states he just wants to take a nap! Short Term Goals  Time Frame for Short term goals: by discharge, pt to demo  Short Term Goal 1: bed mob tasks with use of rail as needed to max assist of 1. Short Term Goal 2: UB ADL to min assist/set up and LB ADL to max assist of 1 supine in bed with use of rail/AE as able. Short Term Goal 3: ADL transfers with use of lift/AD to max assist of 1(add functional mob goal to POC as appropriate). Short Term Goal 4: increase in R shld AROM by 10-15 degrees/increase BUE strength by a 1/2 grade to assist with ADL tasks. Short Term Goal 5: sitting balance to SBA and mikey > 20 mins as able to reduce falls/increase overall act mikey.   Long Term Goals  Long Term Goal 1: Pt to stand with mod assist of 1 and AD mikey > 4 mins as able to reduce falls with ADL's. Long Term Goal 2: Caregivers to be I with pressure relief, BUE HEP, EC/WS and fall prevention tech, and DME/AD and AE recommendations with use of handouts. Long Term Goal 3: Pt to complete toileting tasks with use of BSC/AD and grab bar as needed to mod assist of 1.     AM-PAC Score        AM-[facility] Inpatient Daily Activity Raw Score: 10 (09/13/22 1351)  AM-PAC Inpatient ADL T-Scale Score : 27.31 (09/13/22 1351)  ADL Inpatient CMS 0-100% Score: 74.7 (09/13/22 1351)  ADL Inpatient CMS G-Code Modifier : CL (09/13/22 1351)      Therapy Time   Individual Concurrent Group Co-treatment   Time In 0957         Time Out 1035         Minutes Ann-Marie Arellano 83, OT

## 2022-09-13 NOTE — PROGRESS NOTES
Renal Progress Note    Patient :  Joon Kirby; 80 y.o. MRN# [id number]  Location:    Attending:  Nikki Hannon MD  Admit Date:  2022   Hospital Day: 5    Subjective:     Patient was seen and examined. No new issues reported overnight. He mentions that he feels little better today. No nausea vomiting diarrhea, no chest pain reported. Serum creatinine did improve to 2.74 mg/DL electrolytes stable with sodium 131, potassium 5.3, bicarb 22 and calcium 8.1. Urine output documented as about 900 cc in the last 24 hours. Currently on normal saline at 50 cc an hour. Blood culture 2022: Positive for E. coli. Urine culture 2022 positive for E. coli as well. Outpatient Medications:     No medications prior to admission. Current Medications:     Scheduled Meds:    pantoprazole  40 mg Oral QAM AC    cefTRIAXone (ROCEPHIN) IV  1,000 mg IntraVENous Q24H    midodrine  10 mg Oral TID WC    sodium chloride flush  5-40 mL IntraVENous 2 times per day     Continuous Infusions:    sodium chloride 50 mL/hr at 22 1303    norepinephrine Stopped (09/10/22 1312)    dextrose      sodium chloride Stopped (22 1432)     PRN Meds:  glucose, dextrose bolus **OR** dextrose bolus, glucagon (rDNA), dextrose, sodium chloride flush, sodium chloride, ondansetron **OR** ondansetron, acetaminophen **OR** acetaminophen    Input/Output:       I/O last 3 completed shifts: In: 1539.6 [I.V.:1441.1; IV Piggyback:98.6]  Out: 1275 [Urine:1275]. No data found. Vital Signs:   Temperature:  Temp: 97.8 °F (36.6 °C)  TMax:   Temp (24hrs), Av.6 °F (36.4 °C), Min:97.1 °F (36.2 °C), Max:97.8 °F (36.6 °C)    Respirations:  Resp: 16  Pulse:   Heart Rate: 85  BP:    BP: 122/60  BP Range: Systolic (87GRJ), ITN:422 , Min:101 , IRI:494       Diastolic (91GFM), AQY:24, Min:60, Max:78      Physical Examination:     General:  AAO x 3, speaking in full sentences, no accessory muscle use.   HEENT: Atraumatic, normocephalic, no throat congestion, moist mucosa. Eyes:   Pupils equal, round and reactive to light, EOMI. Neck:   Supple  Chest:   Bilateral vesicular breath sounds, no rales or wheezes. Cardiac:  S1 S2 RR, no murmurs, gallops or rubs. Abdomen: Soft, non-tender, no masses or organomegaly, BS audible. :   No suprapubic or flank tenderness. Neuro:  AAO x 3, No FND. SKIN:  No rashes, good skin turgor. Extremities:  No edema.     Labs:       Recent Labs     09/11/22  0507 09/13/22  0812   WBC 31.8* 13.8*   RBC 3.70* 3.55*   HGB 11.3* 10.7*   HCT 31.4* 30.6*   MCV 84.9 86.2   MCH 30.5 30.1   MCHC 36.0* 35.0*   RDW 16.6* 17.0*   PLT 87* 60*   MPV 10.5 8.8      BMP:   Recent Labs     09/11/22  0507 09/12/22  0515 09/13/22  0344   * 130* 131*   K 5.0 4.8 5.3   CL 94* 99 99   CO2 26 22 22   BUN 56* 52* 50*   CREATININE 3.35* 3.17* 2.74*   GLUCOSE 60* 85 85   CALCIUM 7.8* 7.6* 8.1*      Magnesium:    Recent Labs     09/11/22  0507   MG 1.8     Albumin:    Recent Labs     09/11/22  0507 09/12/22  0515   LABALBU 1.7* 1.5*     BNP:    No results found for: BNP  YENNY:    No results found for: YENNY  SPEP:  Lab Results   Component Value Date/Time    PROT 5.5 09/12/2022 05:15 AM     Hep BsAg:         Lab Results   Component Value Date/Time    HEPBSAG NONREACTIVE 09/13/2022 08:12 AM     Hep C AB:          Lab Results   Component Value Date/Time    HEPCAB NONREACTIVE 09/13/2022 08:12 AM       Urinalysis/Chemistries:      Lab Results   Component Value Date/Time    NITRU NEGATIVE 09/08/2022 03:33 PM    COLORU Dark Yellow 09/08/2022 03:33 PM    PHUR 5.5 09/08/2022 03:33 PM    WBCUA 5 TO 10 09/08/2022 03:33 PM    RBCUA 20 TO 50 09/08/2022 03:33 PM    MUCUS NOT REPORTED 07/31/2019 07:58 AM    TRICHOMONAS NOT REPORTED 07/31/2019 07:58 AM    YEAST NOT REPORTED 07/31/2019 07:58 AM    BACTERIA MANY 09/08/2022 03:33 PM    SPECGRAV 1.015 09/08/2022 03:33 PM    LEUKOCYTESUR SMALL 09/08/2022 03:33 PM    UROBILINOGEN Normal 09/08/2022 03:33 PM    BILIRUBINUR  09/08/2022 03:33 PM     Presumptive positive. Unable to confirm due to unavailability of reagent. GLUCOSEU NEGATIVE 09/08/2022 03:33 PM    KETUA TRACE 09/08/2022 03:33 PM    AMORPHOUS NOT REPORTED 07/31/2019 07:58 AM     Urine Sodium:     Lab Results   Component Value Date/Time    SIMONE <20 09/08/2022 06:40 PM     Urine Osmolarity:   Lab Results   Component Value Date/Time    OSMOU 207 09/08/2022 06:40 PM      Urine Creatinine:     Lab Results   Component Value Date/Time    LABCREA 42.2 09/10/2022 01:30 PM     Radiology:     Reviewed. Assessment:     Acute Kidney Injury secondary to ATN from shock, hypotension requiring pressor support, low flow state, decreased intake-now improving. Baseline creatinine seems to be around 1.8-2 mg/dl. Peak creatinine 4.3 mg/dl this admission. E. coli sepsis. Hypotension requiring pressor support. Bacteremia with Blood culture 9/8/2022: Positive for E. coli. UTI with Urine culture 9/8/2022 positive for E. Coli. Colitis and proctitis. Diastolic dysfunction. Mild aortic stenosis. Right and left hepatic lobe nodular foci presumed early abscess on antibiotics per ID. MRCP positive for cirrhosis and liver abscess as per GI. Cirrhosis and splenomegaly. Plan:   Continue IV fluids normal saline at 50 cc an hour. Continue to monitor strict I's and O's renal function. On ProAmatine. Antibiotics as per infectious diseases. BMP in AM.  Will follow. Nutrition   Please ensure that patient is on a renal diet/TF. Avoid nephrotoxic drugs/contrast exposure. Celestino Acosta MD  [facility]     This note is created with the assistance of a speech-recognition program. While intending to generate a document that actually reflects the content of the visit, no guarantees can be provided that every mistake has been identified and corrected by editing.

## 2022-09-13 NOTE — PLAN OF CARE
Problem: Discharge Planning  Goal: Discharge to home or other facility with appropriate resources  Outcome: Progressing  Flowsheets (Taken 9/12/2022 2000)  Discharge to home or other facility with appropriate resources: Identify barriers to discharge with patient and caregiver     Problem: Pain  Goal: Verbalizes/displays adequate comfort level or baseline comfort level  Outcome: Progressing     Problem: Safety - Adult  Goal: Free from fall injury  Outcome: Progressing     Problem: Chronic Conditions and Co-morbidities  Goal: Patient's chronic conditions and co-morbidity symptoms are monitored and maintained or improved  Outcome: Progressing  Flowsheets (Taken 9/12/2022 2000)  Care Plan - Patient's Chronic Conditions and Co-Morbidity Symptoms are Monitored and Maintained or Improved: Monitor and assess patient's chronic conditions and comorbid symptoms for stability, deterioration, or improvement     Problem: Neurosensory - Adult  Goal: Achieves stable or improved neurological status  Outcome: Progressing  Flowsheets (Taken 9/12/2022 2000)  Achieves stable or improved neurological status: Assess for and report changes in neurological status     Problem: Respiratory - Adult  Goal: Achieves optimal ventilation and oxygenation  Outcome: Progressing  Flowsheets (Taken 9/12/2022 2000)  Achieves optimal ventilation and oxygenation:   Assess for changes in respiratory status   Assess for changes in mentation and behavior     Problem: Cardiovascular - Adult  Goal: Absence of cardiac dysrhythmias or at baseline  Outcome: Progressing  Flowsheets (Taken 9/12/2022 2000)  Absence of cardiac dysrhythmias or at baseline: Monitor cardiac rate and rhythm     Problem: Skin/Tissue Integrity - Adult  Goal: Skin integrity remains intact  Outcome: Progressing  Flowsheets (Taken 9/12/2022 2000)  Skin Integrity Remains Intact: Monitor for areas of redness and/or skin breakdown  Goal: Oral mucous membranes remain intact  Outcome: Progressing  Flowsheets (Taken 9/12/2022 2000)  Oral Mucous Membranes Remain Intact: Assess oral mucosa and hygiene practices     Problem: Musculoskeletal - Adult  Goal: Return mobility to safest level of function  Outcome: Progressing  Flowsheets (Taken 9/12/2022 2000)  Return Mobility to Safest Level of Function:   Assess patient stability and activity tolerance for standing, transferring and ambulating with or without assistive devices   Assist with transfers and ambulation using safe patient handling equipment as needed   Ensure adequate protection for wounds/incisions during mobilization  Goal: Maintain proper alignment of affected body part  Outcome: Progressing  Flowsheets (Taken 9/12/2022 2000)  Maintain proper alignment of affected body part: Support and protect limb and body alignment per provider's orders  Goal: Return ADL status to a safe level of function  Outcome: Progressing  Flowsheets (Taken 9/12/2022 2000)  Return ADL Status to a Safe Level of Function:   Administer medication as ordered   Assess activities of daily living deficits and provide assistive devices as needed   Obtain physical therapy/occupational therapy consults as needed   Assist and instruct patient to increase activity and self care as tolerated     Problem: Gastrointestinal - Adult  Goal: Minimal or absence of nausea and vomiting  Outcome: Progressing  Flowsheets (Taken 9/12/2022 2000)  Minimal or absence of nausea and vomiting:   Administer IV fluids as ordered to ensure adequate hydration   Maintain NPO status until nausea and vomiting are resolved   Advance diet as tolerated, if ordered   Nutrition consult to assist patient with adequate nutrition and appropriate food choices  Goal: Maintains or returns to baseline bowel function  Outcome: Progressing  Flowsheets (Taken 9/12/2022 2000)  Maintains or returns to baseline bowel function:   Assess bowel function   Encourage oral fluids to ensure adequate hydration   Administer IV replacement as ordered   Monitor response to electrolyte replacements, including repeat lab results as appropriate  Goal: Hemodynamic stability and optimal renal function maintained  Outcome: Progressing  Flowsheets (Taken 9/12/2022 2000)  Hemodynamic stability and optimal renal function maintained:   Monitor labs and assess for signs and symptoms of volume excess or deficit   Monitor intake, output and patient weight  Goal: Glucose maintained within prescribed range  Outcome: Progressing  Flowsheets (Taken 9/12/2022 2000)  Glucose maintained within prescribed range:   Monitor blood glucose as ordered   Assess for signs and symptoms of hyperglycemia and hypoglycemia   Administer ordered medications to maintain glucose within target range     Problem: Hematologic - Adult  Goal: Maintains hematologic stability  Outcome: Progressing  Flowsheets (Taken 9/12/2022 2000)  Maintains hematologic stability: Assess for signs and symptoms of bleeding or hemorrhage     Problem: ABCDS Injury Assessment  Goal: Absence of physical injury  Outcome: Progressing     Problem: Skin/Tissue Integrity  Goal: Absence of new skin breakdown  Description: 1. Monitor for areas of redness and/or skin breakdown  2. Assess vascular access sites hourly  3. Every 4-6 hours minimum:  Change oxygen saturation probe site  4. Every 4-6 hours:  If on nasal continuous positive airway pressure, respiratory therapy assess nares and determine need for appliance change or resting period.   Outcome: Progressing

## 2022-09-13 NOTE — CARE COORDINATION
[facility] approved patient for admission and can admit at dc.  Vlad Hernandez

## 2022-09-13 NOTE — PROGRESS NOTES
Patient requesting to use urinal and to sleep. Patient adjusted/boosted in bed, might meds given as ordered, door and curtain closed per patient's request. Will do hourly rounds with patient to monitor.

## 2022-09-13 NOTE — PROGRESS NOTES
[facility]  Office: [address], DO, Chai Serna, DO, Belle Acosta, DO, Zenaida Gamboa, DO, Bo Padilla MD, Kalin Coburn MD, Ralf Gerard MD, Deanna Acharya MD,  Ines Feldman MD, Pamela Zamudio MD, Quinton Etienne, DO, Kerry Weller MD,  Abe Mohs, MD, Veronica Dunlap MD, Phong Aviles, DO, Nelsy Carlson MD, Viviana Avendaño MD, Orestes Nguyen MD, Jade Curry MD, Des Rockwell MD, Rupinder Foster MD, [facility], DO, Stacy Cheung MD, Flaco Vargas MD, Darlene Sanchez, CNP,  Umesh Kay, CNP, Gearline Precise, CNP, Mary Nguyen, CNP,  Mathew Morse, [facility], Maximo Geiger, CNP, Celsa Hinojosa, CNP, Carly Atkinson, CNP, Massimo Kerns, CNP, Daksha Tadeo, CNP, ROSARIO JordanC, David Mendez, CNS, Suri Cruz, [facility], Tanmay Helms, CNP, Rach Murrell, CNP, Brittaney Quiles, [facility]    Progress Note    9/13/2022    1:06 PM    Name:   Darby Bridges  MRN:     [id number]     Acct:      [de-identified]   Room:   [address] [place] [address] Day:  5  Admit Date:  9/8/2022 12:37 PM    PCP:   No primary care provider on file. Code Status:  Full Code    Subjective:     C/C:   Chief Complaint   Patient presents with    Altered Mental Status     Interval History Status: .   Denies any new problem  Kidney function is improving, current creatinine 2.74  Medeiros's cath was removed per nephrology request  AFP is high-93.6-GI following  MRI abdomen shows possible early abscesses and left and right hepatic lobe, ID recommends antibiotics to be continued for 6 weeks  Physical therapy recommending SNF      Brief History:     Darby Bridges is 80 y.o. male  Who was admitted to the hospital on 9/8/2022 for treatment of Acute kidney injury superimposed on CKD ([facility] [address] Patient was brought to emergency room by ambulance after his wife called for patients change in mental status.   Apparently patient overdosed on wheezing  Cardiovascular:  negative for chest pain, chest pressure/discomfort, lower extremity edema, palpitations  Gastrointestinal:  negative for abdominal pain, constipation, diarrhea, nausea, vomiting  Neurological:  negative for dizziness, headache    Medications: Allergies: Allergies   Allergen Reactions    Cefazolin Rash     maculopapular rash over his face, chest, torso, and back. Pt tolerated ceftriaxone during 2022 admission. Current Meds:   Scheduled Meds:    pantoprazole  40 mg Oral QAM AC    cefTRIAXone (ROCEPHIN) IV  1,000 mg IntraVENous Q24H    midodrine  10 mg Oral TID WC    sodium chloride flush  5-40 mL IntraVENous 2 times per day     Continuous Infusions:    sodium chloride 50 mL/hr at 22 1303    norepinephrine Stopped (09/10/22 1312)    dextrose      sodium chloride Stopped (22 1432)     PRN Meds: glucose, dextrose bolus **OR** dextrose bolus, glucagon (rDNA), dextrose, sodium chloride flush, sodium chloride, ondansetron **OR** ondansetron, acetaminophen **OR** acetaminophen    Data:     Past Medical History:   has a past medical history of Chronic back pain, Cirrhosis of liver without ascites ([facility] [address] and Diabetes mellitus ([facility] [address] Social History:   reports that he does not currently use alcohol. Family History:   Family History   Problem Relation Age of Onset    Heart Attack Sister        Vitals:  /71   Pulse 87   Temp 97.7 °F (36.5 °C) (Temporal)   Resp 16   Ht 5' 9\" (1.753 m)   Wt 171 lb (77.6 kg)   SpO2 97%   BMI 25.25 kg/m²   Temp (24hrs), Av.5 °F (36.4 °C), Min:96.9 °F (36.1 °C), Max:97.8 °F (36.6 °C)    Recent Labs     22  1851 22  0758 22  1107   POCGLU 86 96 73* 108       I/O (24Hr):     Intake/Output Summary (Last 24 hours) at 2022 1306  Last data filed at 2022 1303  Gross per 24 hour   Intake 1539.61 ml   Output 575 ml   Net 964.61 ml       Labs:  Hematology:  Recent Labs     22  1726 Based on recent CT exam, the gallbladder was relatively contracted with intermediate density fluid and stones. 2.  No evidence for biliary dilatation. 3.  Morphologic findings suggestive of cirrhosis with small perihepatic ascites and probable cyst.     XR CHEST PORTABLE    Result Date: 9/9/2022  1. Right internal jugular line terminates at the superior caval atrial junction. 2.  Shallow inflation with more prominent perihilar opacities. Similar appearance of peribronchial wall thickening. While this in part may represent atelectasis, developing edema should be considered. XR CHEST 1 VIEW    Result Date: 9/8/2022  Suboptimal evaluation secondary to patient positioning/rotation. No radiographic evidence of an acute cardiopulmonary process. MRI ABDOMEN WO CONTRAST MRCP    Result Date: 9/12/2022  Motion limited study, limiting evaluation of the bile ducts. Ill-defined T2 hyperintense nodular foci are seen in the left hepatic lobe and right hepatic lobe, not clearly simple cysts, presumably due to early abscesses given the history of sepsis with metastatic disease considered a less likely etiology. Cirrhosis and splenomegaly Findings of fluid overload denoted by bilateral pleural effusions, body wall anasarca, and abdominal and pelvic ascites Cholelithiasis     US RETROPERITONEAL COMPLETE    Result Date: 9/8/2022  Unremarkable ultrasound of the kidneys and urinary bladder. CT CHEST ABDOMEN PELVIS WO CONTRAST    Result Date: 9/8/2022  1. Diffuse wall thickening of the ascending and transverse colon may represent colitis. 2. Questionable rectal wall thickening may represent proctitis. 3. Suspected cholelithiasis. Suboptimal evaluation of gallbladder fossa. Can correlate with right upper quadrant ultrasound. 4. Mild bladder wall thickening may be related to underdistension versus possible cystitis. Can correlate with urinalysis. 5. Small amount of free fluid present around the liver and spleen.  6. Mild bilateral lower lobe patchy opacities are favored to represent atelectasis. However, an early inflammatory/infectious process is not excluded in the proper clinical setting. 7. Diffuse subcutaneous fat stranding may related to fluid overload. 8. Hypodense blood pool, may be related to the anemia. Physical Examination:        General appearance:  alert, cooperative and no distress  Mental Status:  oriented to person, place and time and normal affect  Lungs:  clear to auscultation bilaterally, normal effort  Heart:  regular rate and rhythm, no murmur  Abdomen:  soft, nontender, nondistended, normal bowel sounds, no masses, hepatomegaly, splenomegaly  Extremities:  no edema, redness, tenderness in the calves  Skin:  no gross lesions, rashes, induration    Assessment:        Hospital Problems             Last Modified POA    * (Principal) Acute kidney injury superimposed on CKD ([address] 9/8/2022 Yes    Hypoglycemia 9/8/2022 Yes    Thrombocytopenia ([address] 9/8/2022 Yes    Leukocytosis 9/8/2022 Yes    Hyponatremia 9/8/2022 Yes    Hypokalemia 9/8/2022 Yes    Hypotension 9/8/2022 Yes    Colitis 9/8/2022 Yes    Acute cystitis with hematuria 9/8/2022 Yes    Proctitis 9/8/2022 Yes    Cholelithiasis 9/8/2022 Yes    Hypomagnesemia 9/8/2022 Yes    Altered mental status 9/8/2022 Yes    Elevated LFTs 9/10/2022 Yes    Abdominal pain 9/10/2022 Yes    Cirrhosis of liver without ascites ([address] 9/11/2022 Yes    Bacteremia due to Escherichia coli 9/11/2022 Yes    E. coli UTI (urinary tract infection) 9/11/2022 Yes    CKD (chronic kidney disease) 9/11/2022 Yes    Overview Signed 9/11/2022 11:09 AM by Rohini Trujillo MD     From DM2, baseline 1.8-2.0            Plan:        Acute renal failure likely has underlying chronic kidney disease stage III. Unsure of his  baseline likely 1.5-2.0.  treated with IV fluids with bicarb infusion,  Kidney ultrasound does not show any evidence of medical renal disease.   Likely has ischemic ATN from hypotension and dehydration. E. coli bacteremia with sepsis secondary to UTI- Rocephin 2 Gm daily   Suspected hepatic abscesses and right and left lobe-continue IV antibiotic for 6 weeks per ID. Underlying type 2 diabetes mellitus with hypoglycemia - Not on medications. Acute Cystitis - On antibiotics   Drug overdose-accidental -   Severe hypoglycemia -dextrose infusion as needed, monitor blood sugars every 2 Hrs for now . Moderate malnutrition -   Awaiting return of kidney function.  Baseline ~ 2.0   Needs SNF for physical therapy evaluation-patient is undecided       Steven Sierra MD  9/13/2022  1:06 PM

## 2022-09-13 NOTE — PROGRESS NOTES
Pulmonary Critical Care Progress Note    Patient seen for the follow up of Acute kidney injury superimposed on CKD ([address]     Subjective:     Has been bed ridden. He is on room air. He denies shortness of breath or cough. Denies chest pain. No syncope. He tolerates oral intake. Leti Hutson Examination:    Vitals: /69   Pulse 92   Temp 97.5 °F (36.4 °C) (Temporal)   Resp (!) 41   Ht 5' 9\" (1.753 m)   Wt 171 lb (77.6 kg)   SpO2 97%   BMI 25.25 kg/m²   SpO2  Av.8 %  Min: 90 %  Max: 100 %  General appearance: alert and cooperative with exam  Neck: No JVD  Lungs: decreased breath sounds no crackles  Heart: regular rate and rhythm, S1, S2 normal, no gallop  Abdomen: Soft, non tender, + BS  Extremities: no cyanosis or clubbing. No significant edema    LABs:    CBC:   Recent Labs     22  0507 22  0812   WBC 31.8* 13.8*   HGB 11.3* 10.7*   HCT 31.4* 30.6*   PLT 87* 60*       BMP:   Recent Labs     22  0515 22  0344   * 131*   K 4.8 5.3   CO2 22 22   BUN 52* 50*   CREATININE 3.17* 2.74*   LABGLOM 19* 22*   GLUCOSE 85 85       PT/INR:   Recent Labs     22  0507 22  0515   PROTIME 19.7* 18.6*   INR 1.7 1.6         LIVER PROFILE:  Recent Labs     22  0507 22  0515   AST 42* 49*   ALT 20 23   LABALBU 1.7* 1.5*      Latest Reference Range & Units 22 08:12   AFP (Alpha Fetoprotein) <8.4 ug/L 93.6 (H)   (H): Data is abnormally high    Radiology:  MRI abdomen  Motion limited study, limiting evaluation of the bile ducts. Ill-defined T2 hyperintense nodular foci are seen in the left hepatic lobe   and right hepatic lobe, not clearly simple cysts, presumably due to early   abscesses given the history of sepsis with metastatic disease considered a   less likely etiology.        Cirrhosis and splenomegaly       Findings of fluid overload denoted by bilateral pleural effusions, body wall   anasarca, and abdominal and pelvic ascites       Cholelithiasis     CXR 9/9  1.  Right internal jugular line terminates at the superior caval atrial   junction. 2.  Shallow inflation with more prominent perihilar opacities. Similar   appearance of peribronchial wall thickening. While this in part may   represent atelectasis, developing edema should be considered. Impression:  Sepsis/septic shock /urinary tract infection/  cholangitis  right hepatic lobe nodular foci presumably due to early abscesses  Bilateral pleural effusion/atelectasis   Acute renal failure /metabolic acidosis   hyponatremia  Likely Liver cancer with elevated AFP /Liver  cirrhosis   Change in mental status   Levophed subcutaneous infiltration    Recurrent hypoglycemia due to sepsis   Colitis on CT   diabetes    Recommendations:   Oxygen by nasal cannula   Incentive spirometer every hour awake   DuoNeb p.r.n.    Rocephin per D consult for 6 weeks   GI consult/consider liver biopsy  Monitor blood pressure off pressors  Monitor creatinine urine output nephrology on consult   Monitor troponins   Monitor blood sugars  Nutritional support   Nephrology on consult    Palliative care consult /code status  Bedrest  Bedside physical therapy  Discussed with RN   Peptic ulcer disease prophylaxis   DVT prophylaxis    Ubaldo Sood MD, MD, [facility]  Pulmonary Critical Care and Sleep Medicine,  [facility]  Cell: [phone]  Office: [phone]

## 2022-09-13 NOTE — PROGRESS NOTES
[facility]    Gastroenterology Daily Progress Note      Patient:   Kasia Coffey   :    1935   Facility:   [facility]  Date:     2022  Consultant:   DIONICIO Prater CNP, CNP      SUBJECTIVE  80 y.o. male admitted 2022 with Hypocalcemia [E83.51]  Hypokalemia [E87.6]  Lactic acidosis [E87.2]  Hypomagnesemia [E83.42]  Colitis [K52.9]  Hyponatremia [E87.1]  Proctitis [K62.89]  Hypoglycemia [E16.2]  HINA (acute kidney injury) ([facility] [address] [N17.9]  Calculus of gallbladder without cholecystitis without obstruction [K80.20]  Acute cystitis with hematuria [N30.01]  Altered mental status, unspecified altered mental status type [R41.82]  Acute kidney injury superimposed on CKD ([facility] [address] [N17.9, N18.9] and seen for abdominal pain, elevated lft's. The pt was seen and examined. He is confused no c/o abdominal pain or nausea. Mrcp showed cirrhosis with two small liver abscesses.   Leucocytosis is improved        OBJECTIVE  Scheduled Meds:   pantoprazole  40 mg Oral QAM AC    cefTRIAXone (ROCEPHIN) IV  1,000 mg IntraVENous Q24H    midodrine  10 mg Oral TID WC    sodium chloride flush  5-40 mL IntraVENous 2 times per day       Vital Signs:  /69   Pulse 92   Temp 97.7 °F (36.5 °C) (Temporal)   Resp (!) 41   Ht 5' 9\" (1.753 m)   Wt 171 lb (77.6 kg)   SpO2 97%   BMI 25.25 kg/m²      Physical Exam:     General Appearance: alert and oriented to person,  well-developed and well-nourished, in no acute distress  Skin: warm and dry, no rash or erythema  Head: normocephalic and atraumatic  Eyes: pupils equal, round, and reactive to light, extraocular eye movements intact, conjunctivae normal  ENT: hearing grossly normal bilaterally  Neck: neck supple and non tender without mass, no thyromegaly or thyroid nodules, no cervical lymphadenopathy   Pulmonary/Chest: clear to auscultation bilaterally- no wheezes, rales or rhonchi, normal air movement, no respiratory distress  Cardiovascular: normal rate, regular rhythm, normal S1 and S2, no murmurs, rubs, clicks or gallops, distal pulses intact, no carotid bruits  Abdomen: soft, non-tender, non-distended, normal bowel sounds, no masses or organomegaly  Extremities: no cyanosis, clubbing or edema  Musculoskeletal: normal range of motion, no joint swelling, deformity or tenderness  Neurologic: oriented to person and muscle strength normal    Lab and Imaging Review     CBC  Recent Labs     09/11/22 0507 09/13/22  0812   WBC 31.8* 13.8*   HGB 11.3* 10.7*   HCT 31.4* 30.6*   MCV 84.9 86.2   PLT 87* 60*       BMP  Recent Labs     09/11/22  0507 09/12/22  0515 09/13/22  0344   * 130* 131*   K 5.0 4.8 5.3   CL 94* 99 99   CO2 26 22 22   BUN 56* 52* 50*   CREATININE 3.35* 3.17* 2.74*   GLUCOSE 60* 85 85   CALCIUM 7.8* 7.6* 8.1*       LFTS  Recent Labs     09/11/22  0507 09/12/22  0515   ALKPHOS 199* 237*   ALT 20 23   AST 42* 49*   PROT 5.5* 5.5*   BILITOT 3.7* 3.1*   BILIDIR  --  2.7*   LABALBU 1.7* 1.5*         PT/INR  Recent Labs     09/11/22  0507 09/12/22  0515   PROTIME 19.7* 18.6*   INR 1.7 1.6     FINDINGS:ct abd 9/8/22       Chest:       Suboptimal evaluation secondary to motion degradation. Chest Wall and Thoracic Inlet: No axillary or supraclavicular   lymphadenopathy. The thyroid gland appears within normal limits. Mediastinum and Tali: Hypodense blood pool. No mediastinal or hilar   lymphadenopathy. Normal caliber of the thoracic aorta. The heart is normal   in size. No pericardial effusion. Lungs: Mild bilateral lower lobe patchy opacities are favored to represent   atelectasis. However, an early inflammatory/infectious process is not   excluded in the proper clinical setting. Pleura: No pleural effusion. No pneumothorax. Bones: No suspicious osseous findings. Ossification of the anterior   longitudinal ligament may be related to diffuse idiopathic skeletal   hyperostosis (DISH).            Abdomen/Pelvis: Organs: There is no acute abnormality of the liver, pancreas, spleen,   adrenals, or kidneys. Suspected cholelithiasis. Suboptimal evaluation of   the gallbladder fossa. Bilateral perinephric fat stranding present. GI/Bowel: Postsurgical changes compatible with gastric bypass. Bowel caliber   is normal.  Diffuse wall thickening of the ascending and transverse colon may   represent colitis. Questionable rectal wall thickening. There is no   evidence of acute appendicitis. Pelvis: Mild bladder wall thickening. Peritoneum/Retroperitoneum: There is no free air. Small amount of free fluid   present around the liver and spleen. No pathologically enlarged lymph nodes   identified. Scattered atherosclerotic calcifications. Bones/Soft Tissues: No suspicious osseous findings. Diffuse subcutaneous fat   stranding may related to fluid overload. Small fat containing umbilical   hernia. Multilevel lumbar spondylosis. Impression   1. Diffuse wall thickening of the ascending and transverse colon may   represent colitis. 2. Questionable rectal wall thickening may represent proctitis. 3. Suspected cholelithiasis. Suboptimal evaluation of gallbladder fossa. Can correlate with right upper quadrant ultrasound. 4. Mild bladder wall thickening may be related to underdistension versus   possible cystitis. Can correlate with urinalysis. 5. Small amount of free fluid present around the liver and spleen. 6. Mild bilateral lower lobe patchy opacities are favored to represent   atelectasis. However, an early inflammatory/infectious process is not   excluded in the proper clinical setting. 7. Diffuse subcutaneous fat stranding may related to fluid overload. 8. Hypodense blood pool, may be related to the anemia. FINDINGS:mrcp 9/12/22   Bilateral pleural effusions are seen. There is adjacent consolidation at the   lung bases. There is body wall anasarca. Liver is shrunken and nodular in its contour, compatible with cirrhosis. A few well-defined T2 hyperintense foci are seen throughout the liver,   measuring 11 mm in size or less, likely cysts. There are multiple ill-defined T2 hyperintense nodules in both the right and   left hepatic lobe, not clearly cysts. Index nodule posteriorly in the right   hepatic lobe measures 2.3 cm. Index nodule in the left hepatic lobe near the   dome, measures 2.4 cm. There is mild prominence of the intrahepatic bile ducts centrally. Extrahepatic common duct is poorly seen. .  Motion artifact limits evaluation   of the bile ducts for filling defect       Gallbladder is contracted with multiple filling defects. Pancreatic Duct: There is ill-defined peripancreatic edema. Pancreas is   poorly seen overall. Other: There is splenomegaly. Adrenal glands are unremarkable. No hydronephrosis on the right. No   hydronephrosis on the left. Scattered fluid-filled loops of small and large bowel are seen       There is mild abdominal and pelvic ascites       No retroperitoneal adenopathy. No retroperitoneal hematoma. Spurring is seen in the spine. Impression   Motion limited study, limiting evaluation of the bile ducts. Ill-defined T2 hyperintense nodular foci are seen in the left hepatic lobe   and right hepatic lobe, not clearly simple cysts, presumably due to early   abscesses given the history of sepsis with metastatic disease considered a   less likely etiology.        Cirrhosis and splenomegaly       Findings of fluid overload denoted by bilateral pleural effusions, body wall   anasarca, and abdominal and pelvic ascites       Cholelithiasis         ASSESSMENT/plan  Abdominal pain with colitis per imaging, ecoli sepsis, UTI liver abscess x2 on mrcp  Abx per ID  Trend lft  Eventual colonoscopy when more stable to eval rectal wall thickening seen on CT    2.cirrhosis per imaging  Acute hepatitis panel and afp ordered  Low salt diet  Avoid sedatives and narcotics    3. AMS hx melatonin OD    This plan was formulated in collaboration with Dr. Freeman Corral .     Electronically signed by: DIONICIO Villalobos CNP on 9/13/2022 at 3:59 PM

## 2022-09-13 NOTE — PROGRESS NOTES
Dr. Carter Ormond attempted to call wife to discuss results of MRCP, there was no answer & voicemail is not set up

## 2022-09-13 NOTE — PROGRESS NOTES
End Of Shift Note  [facility]  Summary of shift: Patient had an uneventful night and stated he was able to get some sleep. Medications were administered as ordered. VSS throughout the night. Patient calling out appropriately when he needs something. Using the urinal appropriately. Patient has been A&O x3. Patient unaware of situation. Vitals:    Vitals:    09/13/22 0200 09/13/22 0300 09/13/22 0400 09/13/22 0500   BP: 117/67 113/64 119/64 122/71   Pulse: 89 95 91 90   Resp: 13 16 15 13   Temp:   97.4 °F (36.3 °C)    TempSrc:   Temporal    SpO2: 100% 97% 96% 94%   Weight:       Height:            I&O:   Intake/Output Summary (Last 24 hours) at 9/13/2022 0636  Last data filed at 9/13/2022 0000  Gross per 24 hour   Intake 558.68 ml   Output 700 ml   Net -141.32 ml       Resp Status: VSS. No oxygen needed.      Ventilator Settings:     / / /     Critical Care IV infusions:   sodium chloride 50 mL/hr at 09/13/22 0612    norepinephrine Stopped (09/10/22 1312)    dextrose      sodium chloride Stopped (09/11/22 1432)        LDA:   CVC Triple Lumen 09/09/22 Right Internal jugular (Active)   Number of days: 3       Peripheral IV 09/09/22 Distal;Left Forearm (Active)   Number of days: 3

## 2022-09-14 ENCOUNTER — APPOINTMENT (OUTPATIENT)
Dept: INTERVENTIONAL RADIOLOGY/VASCULAR | Age: 87
DRG: 871 | End: 2022-09-14
Payer: MEDICARE

## 2022-09-14 LAB
ANION GAP SERPL CALCULATED.3IONS-SCNC: 12 MMOL/L (ref 9–17)
BUN BLDV-MCNC: 43 MG/DL (ref 8–23)
BUN/CREAT BLD: 16 (ref 9–20)
CALCIUM SERPL-MCNC: 8 MG/DL (ref 8.6–10.4)
CHLORIDE BLD-SCNC: 99 MMOL/L (ref 98–107)
CO2: 21 MMOL/L (ref 20–31)
CREAT SERPL-MCNC: 2.72 MG/DL (ref 0.7–1.2)
GFR AFRICAN AMERICAN: 27 ML/MIN
GFR NON-AFRICAN AMERICAN: 22 ML/MIN
GFR SERPL CREATININE-BSD FRML MDRD: ABNORMAL ML/MIN/{1.73_M2}
GLUCOSE BLD-MCNC: 129 MG/DL (ref 75–110)
GLUCOSE BLD-MCNC: 79 MG/DL (ref 75–110)
GLUCOSE BLD-MCNC: 95 MG/DL (ref 70–99)
POTASSIUM SERPL-SCNC: 4.7 MMOL/L (ref 3.7–5.3)
SODIUM BLD-SCNC: 132 MMOL/L (ref 135–144)

## 2022-09-14 PROCEDURE — 99232 SBSQ HOSP IP/OBS MODERATE 35: CPT | Performed by: NURSE PRACTITIONER

## 2022-09-14 PROCEDURE — 36415 COLL VENOUS BLD VENIPUNCTURE: CPT

## 2022-09-14 PROCEDURE — 97530 THERAPEUTIC ACTIVITIES: CPT

## 2022-09-14 PROCEDURE — 6370000000 HC RX 637 (ALT 250 FOR IP): Performed by: INTERNAL MEDICINE

## 2022-09-14 PROCEDURE — 2000000000 HC ICU R&B

## 2022-09-14 PROCEDURE — APPSS30 APP SPLIT SHARED TIME 16-30 MINUTES: Performed by: NURSE PRACTITIONER

## 2022-09-14 PROCEDURE — 36573 INSJ PICC RS&I 5 YR+: CPT

## 2022-09-14 PROCEDURE — C1751 CATH, INF, PER/CENT/MIDLINE: HCPCS

## 2022-09-14 PROCEDURE — 97116 GAIT TRAINING THERAPY: CPT

## 2022-09-14 PROCEDURE — 6370000000 HC RX 637 (ALT 250 FOR IP): Performed by: FAMILY MEDICINE

## 2022-09-14 PROCEDURE — 80048 BASIC METABOLIC PNL TOTAL CA: CPT

## 2022-09-14 PROCEDURE — 2580000003 HC RX 258: Performed by: NURSE PRACTITIONER

## 2022-09-14 PROCEDURE — 82947 ASSAY GLUCOSE BLOOD QUANT: CPT

## 2022-09-14 PROCEDURE — 6360000002 HC RX W HCPCS: Performed by: NURSE PRACTITIONER

## 2022-09-14 PROCEDURE — 2580000003 HC RX 258: Performed by: INTERNAL MEDICINE

## 2022-09-14 PROCEDURE — 97110 THERAPEUTIC EXERCISES: CPT

## 2022-09-14 PROCEDURE — 02HV33Z INSERTION OF INFUSION DEVICE INTO SUPERIOR VENA CAVA, PERCUTANEOUS APPROACH: ICD-10-PCS | Performed by: INTERNAL MEDICINE

## 2022-09-14 PROCEDURE — 97535 SELF CARE MNGMENT TRAINING: CPT

## 2022-09-14 RX ADMIN — SODIUM CHLORIDE, PRESERVATIVE FREE 10 ML: 5 INJECTION INTRAVENOUS at 09:28

## 2022-09-14 RX ADMIN — CEFTRIAXONE SODIUM 1000 MG: 1 INJECTION, POWDER, FOR SOLUTION INTRAMUSCULAR; INTRAVENOUS at 14:37

## 2022-09-14 RX ADMIN — SODIUM CHLORIDE, PRESERVATIVE FREE 10 ML: 5 INJECTION INTRAVENOUS at 22:44

## 2022-09-14 RX ADMIN — SODIUM CHLORIDE: 9 INJECTION, SOLUTION INTRAVENOUS at 03:09

## 2022-09-14 RX ADMIN — MIDODRINE HYDROCHLORIDE 10 MG: 10 TABLET ORAL at 09:21

## 2022-09-14 RX ADMIN — SODIUM CHLORIDE: 9 INJECTION, SOLUTION INTRAVENOUS at 22:45

## 2022-09-14 RX ADMIN — PANTOPRAZOLE SODIUM 40 MG: 40 TABLET, DELAYED RELEASE ORAL at 05:43

## 2022-09-14 ASSESSMENT — PAIN SCALES - GENERAL
PAINLEVEL_OUTOF10: 0

## 2022-09-14 ASSESSMENT — ENCOUNTER SYMPTOMS
GASTROINTESTINAL NEGATIVE: 1
RESPIRATORY NEGATIVE: 1

## 2022-09-14 NOTE — PROGRESS NOTES
[facility]    Gastroenterology Daily Progress Note      Patient:   Hetal Sage   :    1935   Facility:   [facility]  Date:     2022  Consultant:   Domingo Orellana, DIONICIO - LIV, CNP      SUBJECTIVE  80 y.o. male admitted 2022 with Hypocalcemia [E83.51]  Hypokalemia [E87.6]  Lactic acidosis [E87.2]  Hypomagnesemia [E83.42]  Colitis [K52.9]  Hyponatremia [E87.1]  Proctitis [K62.89]  Hypoglycemia [E16.2]  HINA (acute kidney injury) ([facility] [address] [N17.9]  Calculus of gallbladder without cholecystitis without obstruction [K80.20]  Acute cystitis with hematuria [N30.01]  Altered mental status, unspecified altered mental status type [R41.82]  Acute kidney injury superimposed on CKD ([facility] [address] [N17.9, N18.9] and seen for abdominal pain; cirrhosis by imaging. The pt was seen and examined. He is alert and oriented x3, no c/o abdominal pain or nausea remains on abx. Afp elevated, acute hepatitis panel is non reactive. Last BM yesterday.          OBJECTIVE  Scheduled Meds:   pantoprazole  40 mg Oral QAM AC    cefTRIAXone (ROCEPHIN) IV  1,000 mg IntraVENous Q24H    midodrine  10 mg Oral TID WC    sodium chloride flush  5-40 mL IntraVENous 2 times per day       Vital Signs:  /65   Pulse 87   Temp 98.1 °F (36.7 °C) (Temporal)   Resp 12   Ht 5' 9\" (1.753 m)   Wt 171 lb (77.6 kg)   SpO2 95%   BMI 25.25 kg/m²      Physical Exam:     General Appearance: alert and oriented to person, place and time, well-developed and well-nourished, in no acute distress  Skin: warm and dry, no rash or erythema  Head: normocephalic and atraumatic  Eyes: pupils equal, round, and reactive to light, extraocular eye movements intact, conjunctivae normal  ENT: hearing grossly normal bilaterally  Neck: neck supple and non tender without mass, no thyromegaly or thyroid nodules, no cervical lymphadenopathy   Pulmonary/Chest: clear to auscultation bilaterally- no wheezes, rales or rhonchi, normal air movement, no respiratory distress  Cardiovascular: normal rate, regular rhythm, normal S1 and S2, no murmurs, rubs, clicks or gallops, distal pulses intact, no carotid bruits  Abdomen: soft, non-tender, non-distended, normal bowel sounds, no masses or organomegaly  Extremities: no cyanosis, clubbing or edema  Musculoskeletal: normal range of motion, no joint swelling, deformity or tenderness  Neurologic: no cranial nerve deficit and muscle strength normal    Lab and Imaging Review     CBC  Recent Labs     09/13/22  0812   WBC 13.8*   HGB 10.7*   HCT 30.6*   MCV 86.2   PLT 60*       BMP  Recent Labs     09/12/22  0515 09/13/22  0344 09/14/22  0354   * 131* 132*   K 4.8 5.3 4.7   CL 99 99 99   CO2 22 22 21   BUN 52* 50* 43*   CREATININE 3.17* 2.74* 2.72*   GLUCOSE 85 85 95   CALCIUM 7.6* 8.1* 8.0*       LFTS  Recent Labs     09/12/22  0515   ALKPHOS 237*   ALT 23   AST 49*   PROT 5.5*   BILITOT 3.1*   BILIDIR 2.7*   LABALBU 1.5*         PT/INR  Recent Labs     09/12/22  0515   PROTIME 18.6*   INR 1.6      Latest Reference Range & Units 9/13/22 08:12   Hep A IgM NONREACTIVE  NONREACTIVE   Hepatitis B Surface Ag NONREACTIVE  NONREACTIVE   Hepatitis C Ab NONREACTIVE  NONREACTIVE   Hep B Core Ab, IgM NONREACTIVE  NONREACTIVE      Latest Reference Range & Units 9/13/22 08:12   AFP (Alpha Fetoprotein) <8.4 ug/L 93.6 (H)   (H): Data is abnormally high  FINDINGS:ct abd 9/8/22       Chest:       Suboptimal evaluation secondary to motion degradation. Chest Wall and Thoracic Inlet: No axillary or supraclavicular   lymphadenopathy. The thyroid gland appears within normal limits. Mediastinum and Tali: Hypodense blood pool. No mediastinal or hilar   lymphadenopathy. Normal caliber of the thoracic aorta. The heart is normal   in size. No pericardial effusion. Lungs: Mild bilateral lower lobe patchy opacities are favored to represent   atelectasis.   However, an early inflammatory/infectious process is not   excluded in the proper clinical setting. Pleura: No pleural effusion. No pneumothorax. Bones: No suspicious osseous findings. Ossification of the anterior   longitudinal ligament may be related to diffuse idiopathic skeletal   hyperostosis (DISH). Abdomen/Pelvis:       Organs: There is no acute abnormality of the liver, pancreas, spleen,   adrenals, or kidneys. Suspected cholelithiasis. Suboptimal evaluation of   the gallbladder fossa. Bilateral perinephric fat stranding present. GI/Bowel: Postsurgical changes compatible with gastric bypass. Bowel caliber   is normal.  Diffuse wall thickening of the ascending and transverse colon may   represent colitis. Questionable rectal wall thickening. There is no   evidence of acute appendicitis. Pelvis: Mild bladder wall thickening. Peritoneum/Retroperitoneum: There is no free air. Small amount of free fluid   present around the liver and spleen. No pathologically enlarged lymph nodes   identified. Scattered atherosclerotic calcifications. Bones/Soft Tissues: No suspicious osseous findings. Diffuse subcutaneous fat   stranding may related to fluid overload. Small fat containing umbilical   hernia. Multilevel lumbar spondylosis. Impression   1. Diffuse wall thickening of the ascending and transverse colon may   represent colitis. 2. Questionable rectal wall thickening may represent proctitis. 3. Suspected cholelithiasis. Suboptimal evaluation of gallbladder fossa. Can correlate with right upper quadrant ultrasound. 4. Mild bladder wall thickening may be related to underdistension versus   possible cystitis. Can correlate with urinalysis. 5. Small amount of free fluid present around the liver and spleen. 6. Mild bilateral lower lobe patchy opacities are favored to represent   atelectasis. However, an early inflammatory/infectious process is not   excluded in the proper clinical setting.    7. Diffuse subcutaneous fat stranding may related to fluid overload. 8. Hypodense blood pool, may be related to the anemia. FINDINGS:mrcp 9/12/22   Bilateral pleural effusions are seen. There is adjacent consolidation at the   lung bases. There is body wall anasarca. Liver is shrunken and nodular in its contour, compatible with cirrhosis. A few well-defined T2 hyperintense foci are seen throughout the liver,   measuring 11 mm in size or less, likely cysts. There are multiple ill-defined T2 hyperintense nodules in both the right and   left hepatic lobe, not clearly cysts. Index nodule posteriorly in the right   hepatic lobe measures 2.3 cm. Index nodule in the left hepatic lobe near the   dome, measures 2.4 cm. There is mild prominence of the intrahepatic bile ducts centrally. Extrahepatic common duct is poorly seen. .  Motion artifact limits evaluation   of the bile ducts for filling defect       Gallbladder is contracted with multiple filling defects. Pancreatic Duct: There is ill-defined peripancreatic edema. Pancreas is   poorly seen overall. Other: There is splenomegaly. Adrenal glands are unremarkable. No hydronephrosis on the right. No   hydronephrosis on the left. Scattered fluid-filled loops of small and large bowel are seen       There is mild abdominal and pelvic ascites       No retroperitoneal adenopathy. No retroperitoneal hematoma. Spurring is seen in the spine. Impression   Motion limited study, limiting evaluation of the bile ducts. Ill-defined T2 hyperintense nodular foci are seen in the left hepatic lobe   and right hepatic lobe, not clearly simple cysts, presumably due to early   abscesses given the history of sepsis with metastatic disease considered a   less likely etiology.        Cirrhosis and splenomegaly       Findings of fluid overload denoted by bilateral pleural effusions, body wall   anasarca, and abdominal and pelvic ascites       Cholelithiasis           ASSESSMENT/plan  Abdominal pain with colitis per imaging, ecoli sepsis, UTI liver abscess x2 on mrcp improving  Abx per ID  Trend lft  Eventual colonoscopy when more stable to eval rectal wall thickening seen on CT as outpt     2.cirrhosis per imaging  Acute hepatitis panel non reactive afp possibly elevated due to liver infection, this needs to be repeated as outpt  Low salt diet  Avoid sedatives and narcotics     3. AMS hx melatonin OD       Time spent reviewing chart assessing pt and d/w attending md around 30 minutes    Gi signing off      This plan was formulated in collaboration with Dr. Brittani Ervin .     Electronically signed by: DIONICIO Ontiveros CNP on 9/14/2022 at 2:02 PM

## 2022-09-14 NOTE — PROGRESS NOTES
solutions;Assistance required to correct errors made  Insights: Not aware of deficits  Initiation: Requires cues for all  Sequencing: Requires cues for all  Cognition Comment: Pt easily agitated and resistive to participation in functional tasks. Orientation  Overall Orientation Status: Impaired   Perception  Overall Perceptual Status: Impaired  Initiation: Cues to initiate tasks  Motor Planning: Hand over hand to sequence tasks     ADL  Putting On/Taking Off Footwear  Assistance Level: Dependent  Toileting  Assistance Level: Dependent; Requires x 2 assistance  Skilled Clinical Factors: Pt req'd several sit to stands from [facility] in [address] stedy to complete hygiene after BM with 2 staff assist. DEP x2 to achieve full upright stand. Toilet Transfers  Technique: Stand step (sarah stedy)  Equipment: Beside commode  Additional Factors: Verbal cues;Cues for hand placement  Assistance Level: Dependent; Requires x 2 assistance  Skilled Clinical Factors: Pt initially was able to stand up from EOB and stand step transfer with RW to [facility] with MOD A x2, once pt was done having BM he req'd DEP x2 with use of sarah stedy to stand up from [facility] and transfer to recliner. Supine to Sit  Assistance Level: Minimal assistance; Requires x 2 assistance  Skilled Clinical Factors: with HOB up and max verbal/tactile cues for sequencing movements, use of bed rail and overall safety. Sit to Stand  Assistance Level: Dependent; Requires x 2 assistance  Stand to Sit  Assistance Level: Dependent; Requires x 2 assistance  Skilled Clinical Factors: Pt completed multiple STS this date. First attempt with RW with Mod x2 assist. Once standing pt began having a BM and abruptly sat down and requested the bed pan. Writer placed [facility] next to bed for pt use. Pt was able to stand step to [facility] with MOD A x2. Pt unable to stand for >20 seconds requiring multiple stands to complete hygiene.  Alexei Colin then put in place pt completed 3-4 STS with DEP x2 to complete madiha care. Pt becoming increasingly agitated and very fatigued with minimal participation noted. Neuromuscular Education  Neuromuscular education: Yes  NDT Treatment: Sitting;Standing     Assessment  Assessment  Assessment: Pt presents with deficits in bed mobility, transfers, functional mobility and the ability to complete simple self care tasks. Pt req's 2 staff assist for all tasks and use of sarah stedy for transfers. Pt is limited by global weakness, decreased activity tolerance and cognitive deficits. Pt would benefit from continued skilled OT services to increase I and safety during funcitonal tasks to reduce caregiver burden as able. Activity Tolerance: Patient limited by endurance; Patient limited by fatigue  Discharge Recommendations: Patient would benefit from continued therapy after discharge  Safety Devices  Safety Devices in place: Yes  Type of devices: All fall risk precautions in place; Left in chair;Nurse notified;Call light within reach;Gait belt;Patient at risk for falls    Patient Education  Education  Education Given To: Patient  Education Provided: Mobility Training;Transfer Training;Energy Conservation; Safety; Fall Prevention Strategies; Home Exercise Program  Education Method: Verbal;Teach Back;Printed Information/Hand-outs  Barriers to Learning: Cognition  Education Outcome: Continued education needed;Verbalized understanding  Access Code: [facility]  URL: [email or web address]  Date: 09/14/2022  Prepared by:    Exercises  Wrist AROM Flexion Extension - 1 x daily - 7 x weekly - 3 sets - 10 reps  Seated Forearm Pronation and Supination AROM - 1 x daily - 7 x weekly - 3 sets - 10 reps  Seated Elbow Flexion and Extension AROM - 1 x daily - 7 x weekly - 3 sets - 10 reps  Seated Shoulder Shrugs - 1 x daily - 7 x weekly - 3 sets - 10 reps  Seated Shoulder Flexion - 1 x daily - 7 x weekly - 3 sets - 10 reps    Patient Education  Understanding Energy Conservation  Energy Conservation During Daily Tasks  How to Prevent Falls    Plan  Plan  Times per Week: 4-5x/week 1x/day as mikey  Current Treatment Recommendations: Strengthening;ROM;Balance training;Functional mobility training;Neuromuscular re-education;Cognitive reorientation;Pain management; Endurance training; Safety education & training;Positioning;Patient/Caregiver education & training;Equipment evaluation, education, & procurement;Home management training;Self-Care / ADL;Cognitive/Perceptual training;Coordination training    Goals  Patient Goals   Patient goals : Pt states he just wants to take a nap! Short Term Goals  Time Frame for Short term goals: by discharge, pt to demo  Short Term Goal 1: bed mob tasks with use of rail as needed to max assist of 1. Short Term Goal 2: UB ADL to min assist/set up and LB ADL to max assist of 1 supine in bed with use of rail/AE as able. Short Term Goal 3: ADL transfers with use of lift/AD to max assist of 1(add functional mob goal to POC as appropriate). Short Term Goal 4: increase in R shld AROM by 10-15 degrees/increase BUE strength by a 1/2 grade to assist with ADL tasks. Short Term Goal 5: sitting balance to SBA and mikey > 20 mins as able to reduce falls/increase overall act mikey. Long Term Goals  Long Term Goal 1: Pt to stand with mod assist of 1 and AD mikey > 4 mins as able to reduce falls with ADL's. Long Term Goal 2: Caregivers to be I with pressure relief, BUE HEP, EC/WS and fall prevention tech, and DME/AD and AE recommendations with use of handouts. Long Term Goal 3: Pt to complete toileting tasks with use of BSC/AD and grab bar as needed to mod assist of 1.     AM-PAC Score        AM-PAC Inpatient Daily Activity Raw Score: 10 (09/14/22 1245)  AM-PAC Inpatient ADL T-Scale Score : 27.31 (09/14/22 1245)  ADL Inpatient CMS 0-100% Score: 74.7 (09/14/22 1245)  ADL Inpatient CMS G-Code Modifier : CL (09/14/22 1245)      Therapy Time   Individual Concurrent Group Co-treatment   Time In       1025   Time Out 65   Minutes       44       Co-treatment with PT warranted secondary to decreased safety and independence requiring 2 skilled therapy professionals to address individual discipline's goals. OT addressing preparation for ADL transfer, sitting balance for increased ADL performance, sitting/activity tolerance, functional reaching, environmental safety/scanning, fall prevention, functional mobility for ADL transfers, ability to sequence and follow directions, bed mobility tech, and functional UE strength.       Armen Denver, OTA

## 2022-09-14 NOTE — DISCHARGE SUMMARY
[facility]  Office: [address], DO, Markos Valdes, DO, Lenin Moyer, DO, Steven Monique Blood, DO, Yoanna Parkinson MD, Sherry Bedoya MD, Yariel Washington MD, Siva Lott MD,  Floresita Marie MD, Keagan Christensen MD, Loreto Alonso DO, Filomena Tanner MD,  Patel Ackerman MD, Deepa Perez MD, Memo Andrade DO, Rolf Sparks MD, Arnie Vera MD, Alan Navarro MD, Ulysses Gandhi MD, Vish Simms MD, Aprli Alvarez MD, Trav Earl DO, Claudeen Hook, MD, Eric Ramirez MD, Roman Siddiqui, CNP,  Umair Zarate, CNP, Reagan Barrera, CNP, Crystal Choi, CNP,  Tara Varner, [facility], Tanika Ford, CNP, Kyler Myers, CNP, Rome Garcia, CNP, Jacque Sparrow, CNP, Poppy Wolf, CNP, Yariel Tao PA-C, Marylene Bucy, CNS, Rodrigo Reyna, [facility], Lexii Aviles, CNP, Miroslava Nance, CNP, Ade Weir, [facility]    Discharge Summary     Patient ID: Sarah Townsend  :  1935   MRN: [id number]     ACCOUNT:  [de-identified]   Patient's PCP: No primary care provider on file. Admit Date: 2022   Discharge Date: 9/15/2022     Length of Stay: 7  Code Status:  Full Code  Admitting Physician: No admitting provider for patient encounter. 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 coli UTI    CKD (chronic kidney disease)  Resolved Problems:    * No resolved hospital problems.  *      Admission Condition:  poor     Discharged Condition: fair    Hospital Stay:   Admitting history:  Anil Carbone is 80 y.o. male  Who was admitted to the hospital on 9/8/2022 for treatment of Acute kidney injury superimposed on CKD ([address] Patient was brought to emergency room by ambulance after his wife called for patients change in mental status. Apparently patient overdosed on his sleep medication. He took medication within intent to fall asleep faster. Patient also reported that he has known history of arthritis and has been having difficulty with his joints. He was not moving much for last few days. He reports prior history of morbid obesity and weighted > 350 pounds. He underwent bariatric surgery > 25 years ago. He has prior history of diabetes mellitus however is not on any medication. Patient denies any other past medical history. On chart review it appears that patient has been admitted to [facility] with acute kidney injury and had elevated creatinine up to 3.5. Patient is very hard of hearing , he lives with his wife of > 35 Yrs. Patient denies any history of diarrhea, vomiting, nausea, fever. He denies any urinary difficulties, nocturia, retention. Initial evaluation emergency room showed hypotension with blood pressure 88/66 mmHg, blood glucose was significantly low 30's on first contact with EMS and repeat 60 on arrival to emergency room after dextrose. Lab evaluation showed hyponatremia 127, BUN 70, creatinine 4.34, bicarb 17, lactic acid 2.7, magnesium 1.3, troponin 58, albumin 1.8, AST 47, ALT 24, bilirubin 4.9. Alcohol levels negative. Urine tox was negative for cannabinoids, cocaine, methadone, fentanyl, oxycodone, amphetamine. Patient was found to have significant leukocytosis 49.3 with low PLT 96 .  CT abdomen pelvis showed diffuse wall thickening in ascending, transverse colon suggestive of colitis, bladder wall thickening possible cystitis, suboptimal evaluation of gallbladder fossa.   Patient has large right upper quadrant open surgical scar in place. Unsure about gallbladder status. He also has large midline surgical scar which she reports that is from bariatric surgery    Hospital Course:    Denies any new problem  Kidney function is plateauing, current creatinine 2.25 (baseline 1.8-2)  Medeiros's cath was removed per nephrology request  AFP is high-93.6-GI considers this to be possibly due to hepatic lobe infection, plans to recheck later as outpatient after finishing antibiotics  MRI abdomen shows possible early abscesses and left and right hepatic lobe, ID recommends antibiotics to be continued for 6 weeks, patient got PICC line placed     Physical therapy recommending SNF, has been accepted there       Plan:         Acute renal failure likely has underlying chronic kidney disease stage III. Unsure of his  baseline likely 1.8-2.0.  treated with IV fluids with bicarb infusion,  Kidney ultrasound does not show any evidence of medical renal disease. Likely has ischemic ATN from hypotension and dehydration-currently creatinine around 2.7, appears to be plateauing. E. coli bacteremia with sepsis secondary to UTI- Rocephin 2 Gm daily   Suspected hepatic abscesses and right and left lobe-continue IV antibiotic for 6 weeks per ID. Underlying type 2 diabetes mellitus with hypoglycemia - Not on medications.    Acute Cystitis - On antibiotics as above  Drug overdose-accidental -resolved  Moderate malnutrition-encourage to eat more, will need dietary supplements  Repeat BMP in 3 days and send results to nephrology-  Discharge to [facility] today when all arrangements are made    Significant therapeutic interventions: See above notes    Significant Diagnostic Studies:   Labs / Micro:  CBC:   Lab Results   Component Value Date/Time    WBC 9.6 09/15/2022 06:42 AM    RBC 3.38 09/15/2022 06:42 AM    HGB 10.1 09/15/2022 06:42 AM    HCT 30.0 09/15/2022 06:42 AM    MCV 88.8 09/15/2022 06:42 AM    MCH 29.9 09/15/2022 06:42 AM    MCHC 33.7 09/15/2022 06:42 AM    RDW 17.2 09/15/2022 06:42 AM    PLT 68 09/15/2022 06:42 AM     BMP:    Lab Results   Component Value Date/Time    GLUCOSE 92 09/15/2022 06:42 AM     09/15/2022 06:42 AM    K 4.5 09/15/2022 06:42 AM     09/15/2022 06:42 AM    CO2 23 09/15/2022 06:42 AM    ANIONGAP 9 09/15/2022 06:42 AM    BUN 37 09/15/2022 06:42 AM    CREATININE 2.25 09/15/2022 06:42 AM    BUNCRER 16 09/15/2022 06:42 AM    CALCIUM 7.8 09/15/2022 06:42 AM    LABGLOM 28 09/15/2022 06:42 AM    GFRAA 34 09/15/2022 06:42 AM    GFR      09/15/2022 06:42 AM     HFP:    Lab Results   Component Value Date/Time    PROT 5.5 09/12/2022 05:15 AM     CMP:    Lab Results   Component Value Date/Time    GLUCOSE 92 09/15/2022 06:42 AM     09/15/2022 06:42 AM    K 4.5 09/15/2022 06:42 AM     09/15/2022 06:42 AM    CO2 23 09/15/2022 06:42 AM    BUN 37 09/15/2022 06:42 AM    CREATININE 2.25 09/15/2022 06:42 AM    ANIONGAP 9 09/15/2022 06:42 AM    ALKPHOS 237 09/12/2022 05:15 AM    ALT 23 09/12/2022 05:15 AM    AST 49 09/12/2022 05:15 AM    BILITOT 3.1 09/12/2022 05:15 AM    LABALBU 1.5 09/12/2022 05:15 AM    LABGLOM 28 09/15/2022 06:42 AM    GFRAA 34 09/15/2022 06:42 AM    GFR      09/15/2022 06:42 AM    PROT 5.5 09/12/2022 05:15 AM    CALCIUM 7.8 09/15/2022 06:42 AM     PT/INR:    Lab Results   Component Value Date/Time    PROTIME 18.6 09/12/2022 05:15 AM    INR 1.6 09/12/2022 05:15 AM     PTT: No results found for: APTT  FLP:  No results found for: CHOL, TRIG, HDL  U/A:    Lab Results   Component Value Date/Time    COLORU Dark Yellow 09/08/2022 03:33 PM    TURBIDITY Cloudy 09/08/2022 03:33 PM    SPECGRAV 1.015 09/08/2022 03:33 PM    HGBUR 3+ 09/08/2022 03:33 PM    PHUR 5.5 09/08/2022 03:33 PM    PROTEINU 1+ 09/08/2022 03:33 PM    GLUCOSEU NEGATIVE 09/08/2022 03:33 PM    KETUA TRACE 09/08/2022 03:33 PM    BILIRUBINUR  09/08/2022 03:33 PM     Presumptive positive. Unable to confirm due to unavailability of reagent. UROBILINOGEN Normal 09/08/2022 03:33 PM    NITRU NEGATIVE 09/08/2022 03:33 PM    LEUKOCYTESUR SMALL 09/08/2022 03:33 PM     TSH:    Lab Results   Component Value Date/Time    TSH 2.98 09/08/2022 01:05 PM        Radiology:  CT HEAD WO CONTRAST    Result Date: 9/8/2022  No acute intracranial abnormality. Prominent CSF spaces over the frontal and parietal lobes. No evidence of hydrocephalus. US GALLBLADDER RUQ    Result Date: 9/9/2022  1. Technically limited exam.  The gallbladder is not clearly identified. Based on recent CT exam, the gallbladder was relatively contracted with intermediate density fluid and stones. 2.  No evidence for biliary dilatation. 3.  Morphologic findings suggestive of cirrhosis with small perihepatic ascites and probable cyst.     XR CHEST PORTABLE    Result Date: 9/9/2022  1. Right internal jugular line terminates at the superior caval atrial junction. 2.  Shallow inflation with more prominent perihilar opacities. Similar appearance of peribronchial wall thickening. While this in part may represent atelectasis, developing edema should be considered. XR CHEST 1 VIEW    Result Date: 9/8/2022  Suboptimal evaluation secondary to patient positioning/rotation. No radiographic evidence of an acute cardiopulmonary process. MRI ABDOMEN WO CONTRAST MRCP    Result Date: 9/12/2022  Motion limited study, limiting evaluation of the bile ducts. Ill-defined T2 hyperintense nodular foci are seen in the left hepatic lobe and right hepatic lobe, not clearly simple cysts, presumably due to early abscesses given the history of sepsis with metastatic disease considered a less likely etiology. Cirrhosis and splenomegaly Findings of fluid overload denoted by bilateral pleural effusions, body wall anasarca, and abdominal and pelvic ascites Cholelithiasis     US RETROPERITONEAL COMPLETE    Result Date: 9/8/2022  Unremarkable ultrasound of the kidneys and urinary bladder.      CT CHEST ABDOMEN PELVIS WO CONTRAST    Result Date: 9/8/2022  1. Diffuse wall thickening of the ascending and transverse colon may represent colitis. 2. Questionable rectal wall thickening may represent proctitis. 3. Suspected cholelithiasis. Suboptimal evaluation of gallbladder fossa. Can correlate with right upper quadrant ultrasound. 4. Mild bladder wall thickening may be related to underdistension versus possible cystitis. Can correlate with urinalysis. 5. Small amount of free fluid present around the liver and spleen. 6. Mild bilateral lower lobe patchy opacities are favored to represent atelectasis. However, an early inflammatory/infectious process is not excluded in the proper clinical setting. 7. Diffuse subcutaneous fat stranding may related to fluid overload. 8. Hypodense blood pool, may be related to the anemia. Consultations:    Consults:     Final Specialist Recommendations/Findings:   IP CONSULT TO NEPHROLOGY  IP CONSULT TO NEPHROLOGY  IP CONSULT TO HOSPITALIST  IP CONSULT TO CRITICAL CARE  IP CONSULT TO GI  IP CONSULT TO INFECTIOUS DISEASES  IP CONSULT TO INFECTIOUS DISEASES      The patient was seen and examined on day of discharge and this discharge summary is in conjunction with any daily progress note from day of discharge. Discharge plan:     Disposition: [facility]    Physician Follow Up:   PCP within 1 week       Requiring Further Evaluation/Follow Up POST HOSPITALIZATION/Incidental Findings:  Follow-up with nephrologist after repeat labs as recommended    Diet: cardiac diet and diabetic diet    Activity: As tolerated    Instructions to Patient: Continue IV antibiotics for 6 weeks as recommended by ID, follow-up with GI for repeat AFP levels after finishing antibiotics    Discharge Medications:      Medication List        START taking these medications      cefTRIAXone  infusion  Commonly known as: ROCEPHIN  Infuse 1,000 mg intravenously every 24 hours Compound per protocol     midodrine 10 MG tablet  Commonly known as: PROAMATINE  Take 1 tablet by mouth 3 times daily (with meals) for 40 doses     pantoprazole 40 MG tablet  Commonly known as: PROTONIX  Take 1 tablet by mouth every morning (before breakfast)  Start taking on: September 16, 2022               Where to Get Your Medications        These medications were sent to [facility] [id number] [facility], [address] [address] [phone] [facility] [phone]  [address] [place] [address] [place], [address] [place] [address]      Phone: [phone]   midodrine 10 MG tablet  pantoprazole 40 MG tablet       You can get these medications from any pharmacy    Bring a paper prescription for each of these medications  cefTRIAXone  infusion         No discharge procedures on file. Time Spent on discharge is  35 mins in patient examination, evaluation, counseling as well as medication reconciliation, prescriptions for required medications, discharge plan and follow up. Electronically signed by   Lauren Mcmanus MD  9/15/2022  4:09 PM      Thank you Dr. Ana Choudhary primary care provider on file. for the opportunity to be involved in this patient's care.

## 2022-09-14 NOTE — PLAN OF CARE
Problem: Discharge Planning  Goal: Discharge to home or other facility with appropriate resources  9/14/2022 1452 by Courtney Mayes RN  Outcome: Progressing  Flowsheets (Taken 9/14/2022 0800)  Discharge to home or other facility with appropriate resources: Identify barriers to discharge with patient and caregiver  9/14/2022 0242 by Dean Medina RN  Outcome: Progressing  Flowsheets (Taken 9/14/2022 0000)  Discharge to home or other facility with appropriate resources:   Identify barriers to discharge with patient and caregiver   Arrange for needed discharge resources and transportation as appropriate     Problem: Pain  Goal: Verbalizes/displays adequate comfort level or baseline comfort level  9/14/2022 1452 by Courtney Mayes RN  Outcome: Progressing  Flowsheets  Taken 9/14/2022 1200  Verbalizes/displays adequate comfort level or baseline comfort level: Encourage patient to monitor pain and request assistance  Taken 9/14/2022 0800  Verbalizes/displays adequate comfort level or baseline comfort level: Encourage patient to monitor pain and request assistance  9/14/2022 0242 by Dean Medina RN  Outcome: Progressing     Problem: Safety - Adult  Goal: Free from fall injury  9/14/2022 1452 by Courtney Mayes RN  Outcome: Progressing  9/14/2022 0242 by Dean Medina RN  Outcome: Progressing     Problem: Chronic Conditions and Co-morbidities  Goal: Patient's chronic conditions and co-morbidity symptoms are monitored and maintained or improved  9/14/2022 1452 by Courtney Mayes RN  Outcome: Progressing  Flowsheets (Taken 9/14/2022 0800)  Care Plan - Patient's Chronic Conditions and Co-Morbidity Symptoms are Monitored and Maintained or Improved: Monitor and assess patient's chronic conditions and comorbid symptoms for stability, deterioration, or improvement  9/14/2022 0242 by Dean Medina RN  Outcome: Progressing  Flowsheets (Taken 9/14/2022 0000)  Care Plan - Patient's Chronic Conditions and Co-Morbidity Symptoms are Monitored and Maintained or Improved: Monitor and assess patient's chronic conditions and comorbid symptoms for stability, deterioration, or improvement     Problem: Neurosensory - Adult  Goal: Achieves stable or improved neurological status  9/14/2022 1452 by Joselyn Beatty RN  Outcome: Progressing  Flowsheets (Taken 9/14/2022 0800)  Achieves stable or improved neurological status: Assess for and report changes in neurological status  9/14/2022 0242 by Kathi Neal RN  Outcome: Progressing  Flowsheets (Taken 9/14/2022 0000)  Achieves stable or improved neurological status: Assess for and report changes in neurological status     Problem: Respiratory - Adult  Goal: Achieves optimal ventilation and oxygenation  9/14/2022 1452 by Joselyn Beatty RN  Outcome: Progressing  Flowsheets (Taken 9/14/2022 0800)  Achieves optimal ventilation and oxygenation: Assess for changes in respiratory status  9/14/2022 0242 by Kathi Neal RN  Outcome: Progressing  Flowsheets (Taken 9/14/2022 0000)  Achieves optimal ventilation and oxygenation: Assess for changes in respiratory status     Problem: Cardiovascular - Adult  Goal: Absence of cardiac dysrhythmias or at baseline  9/14/2022 1452 by Joselyn Beatty RN  Outcome: Progressing  Flowsheets (Taken 9/14/2022 0800)  Absence of cardiac dysrhythmias or at baseline: Monitor cardiac rate and rhythm  9/14/2022 0242 by Kathi Neal RN  Outcome: Progressing     Problem: Skin/Tissue Integrity - Adult  Goal: Skin integrity remains intact  9/14/2022 1452 by Joselyn Beatty RN  Outcome: Progressing  Flowsheets (Taken 9/14/2022 0800)  Skin Integrity Remains Intact: Monitor for areas of redness and/or skin breakdown  9/14/2022 0242 by Kathi Neal RN  Outcome: Progressing  Flowsheets (Taken 9/14/2022 0000)  Skin Integrity Remains Intact: Monitor for areas of redness and/or skin breakdown  Goal: Oral mucous membranes remain intact  9/14/2022 1452 by Joselyn Beatty RN  Outcome: Progressing  Flowsheets (Taken 9/14/2022 0800)  Oral Mucous Membranes Remain Intact: Assess oral mucosa and hygiene practices  9/14/2022 0242 by Alecia Cook RN  Outcome: Progressing  Flowsheets (Taken 9/14/2022 0000)  Oral Mucous Membranes Remain Intact: Assess oral mucosa and hygiene practices     Problem: Musculoskeletal - Adult  Goal: Return mobility to safest level of function  9/14/2022 1452 by Scottie Saunders RN  Outcome: Progressing  Flowsheets (Taken 9/14/2022 0800)  Return Mobility to Safest Level of Function: Assess patient stability and activity tolerance for standing, transferring and ambulating with or without assistive devices  9/14/2022 0242 by Alecia Cook RN  Outcome: Progressing  Flowsheets (Taken 9/14/2022 0000)  Return Mobility to Safest Level of Function:   Assess patient stability and activity tolerance for standing, transferring and ambulating with or without assistive devices   Assist with transfers and ambulation using safe patient handling equipment as needed  Goal: Maintain proper alignment of affected body part  9/14/2022 1452 by Scottie Saunders RN  Outcome: Progressing  Flowsheets (Taken 9/14/2022 0800)  Maintain proper alignment of affected body part: Support and protect limb and body alignment per provider's orders  9/14/2022 0242 by Alecia Cook RN  Outcome: Progressing  Flowsheets (Taken 9/14/2022 0000)  Maintain proper alignment of affected body part: Support and protect limb and body alignment per provider's orders  Goal: Return ADL status to a safe level of function  9/14/2022 1452 by Scottie Saunders RN  Outcome: Progressing  Flowsheets (Taken 9/14/2022 0800)  Return ADL Status to a Safe Level of Function: Administer medication as ordered  9/14/2022 0242 by Alecia Cook RN  Outcome: Progressing  Flowsheets (Taken 9/14/2022 0000)  Return ADL Status to a Safe Level of Function: Administer medication as ordered     Problem: Gastrointestinal - Adult  Goal: Minimal or absence of nausea and vomiting  9/14/2022 1452 by Tosha Huerta RN  Outcome: Progressing  Flowsheets (Taken 9/14/2022 0800)  Minimal or absence of nausea and vomiting: Administer IV fluids as ordered to ensure adequate hydration  9/14/2022 0242 by Nubia Ac RN  Outcome: Progressing  Flowsheets (Taken 9/14/2022 0000)  Minimal or absence of nausea and vomiting: Administer IV fluids as ordered to ensure adequate hydration  Goal: Maintains or returns to baseline bowel function  9/14/2022 1452 by Tosha Huerta RN  Outcome: Progressing  Flowsheets (Taken 9/14/2022 0800)  Maintains or returns to baseline bowel function: Assess bowel function  9/14/2022 0242 by Nubia Ac RN  Outcome: Progressing  Flowsheets (Taken 9/14/2022 0000)  Maintains or returns to baseline bowel function:   Assess bowel function   Encourage oral fluids to ensure adequate hydration  Goal: Maintains adequate nutritional intake  9/14/2022 1452 by Tosha Huerta RN  Outcome: Progressing  Flowsheets (Taken 9/14/2022 0800)  Maintains adequate nutritional intake: Monitor percentage of each meal consumed  9/14/2022 0242 by Nubia Ac RN  Outcome: Progressing  Flowsheets (Taken 9/14/2022 0000)  Maintains adequate nutritional intake:   Monitor percentage of each meal consumed   Identify factors contributing to decreased intake, treat as appropriate  Goal: Establish and maintain optimal ostomy function  9/14/2022 1452 by Tosha Huerta RN  Outcome: Progressing  Flowsheets (Taken 9/14/2022 0800)  Establish and maintain optimal ostomy function: Administer IV fluids and TPN as ordered  9/14/2022 0242 by Nubia Ac RN  Outcome: Progressing     Problem: Genitourinary - Adult  Goal: Absence of urinary retention  9/14/2022 1452 by Tosha Huerta RN  Outcome: Progressing  Flowsheets (Taken 9/14/2022 0800)  Absence of urinary retention: Assess patients ability to void and empty bladder  9/14/2022 0242 by Nubia Ac RN  Outcome: within normal limits  9/14/2022 1452 by Joselyn Beatty RN  Outcome: Progressing  Flowsheets (Taken 9/14/2022 0800)  Electrolytes maintained within normal limits: Monitor labs and assess patient for signs and symptoms of electrolyte imbalances  9/14/2022 0242 by Kathi Neal RN  Outcome: Progressing  Flowsheets (Taken 9/14/2022 0000)  Electrolytes maintained within normal limits:   Monitor labs and assess patient for signs and symptoms of electrolyte imbalances   Administer electrolyte replacement as ordered  Goal: Hemodynamic stability and optimal renal function maintained  9/14/2022 1452 by Joselyn Beatty RN  Outcome: Progressing  Flowsheets (Taken 9/14/2022 0800)  Hemodynamic stability and optimal renal function maintained: Monitor labs and assess for signs and symptoms of volume excess or deficit  9/14/2022 0242 by Kathi Neal RN  Outcome: Progressing  Flowsheets (Taken 9/14/2022 0000)  Hemodynamic stability and optimal renal function maintained:   Monitor labs and assess for signs and symptoms of volume excess or deficit   Monitor intake, output and patient weight  Goal: Glucose maintained within prescribed range  9/14/2022 1452 by Joselyn Beatty RN  Outcome: Progressing  Flowsheets (Taken 9/14/2022 0800)  Glucose maintained within prescribed range: Monitor blood glucose as ordered  9/14/2022 0242 by Kathi Neal RN  Outcome: Progressing  Flowsheets (Taken 9/14/2022 0000)  Glucose maintained within prescribed range:   Monitor blood glucose as ordered   Assess for signs and symptoms of hyperglycemia and hypoglycemia     Problem: Hematologic - Adult  Goal: Maintains hematologic stability  9/14/2022 1452 by Joselyn Beatty RN  Outcome: Progressing  Flowsheets (Taken 9/14/2022 0800)  Maintains hematologic stability: Assess for signs and symptoms of bleeding or hemorrhage  9/14/2022 0242 by Kathi Neal RN  Outcome: Progressing  Flowsheets (Taken 9/14/2022 0000)  Maintains hematologic stability: Assess for signs and symptoms of bleeding or hemorrhage     Problem: ABCDS Injury Assessment  Goal: Absence of physical injury  9/14/2022 1452 by Vicente Hammer RN  Outcome: Progressing  9/14/2022 0242 by Kamini George RN  Outcome: Progressing     Problem: Skin/Tissue Integrity  Goal: Absence of new skin breakdown  Description: 1. Monitor for areas of redness and/or skin breakdown  2. Assess vascular access sites hourly  3. Every 4-6 hours minimum:  Change oxygen saturation probe site  4. Every 4-6 hours:  If on nasal continuous positive airway pressure, respiratory therapy assess nares and determine need for appliance change or resting period.   9/14/2022 1452 by Vicente Hammer RN  Outcome: Progressing  9/14/2022 0242 by Kamini George RN  Outcome: Progressing

## 2022-09-14 NOTE — PLAN OF CARE
Problem: Discharge Planning  Goal: Discharge to home or other facility with appropriate resources  Outcome: Progressing  Flowsheets (Taken 9/14/2022 0000)  Discharge to home or other facility with appropriate resources:   Identify barriers to discharge with patient and caregiver   Arrange for needed discharge resources and transportation as appropriate     Problem: Pain  Goal: Verbalizes/displays adequate comfort level or baseline comfort level  Outcome: Progressing     Problem: Safety - Adult  Goal: Free from fall injury  Outcome: Progressing     Problem: Chronic Conditions and Co-morbidities  Goal: Patient's chronic conditions and co-morbidity symptoms are monitored and maintained or improved  Outcome: Progressing  Flowsheets (Taken 9/14/2022 0000)  Care Plan - Patient's Chronic Conditions and Co-Morbidity Symptoms are Monitored and Maintained or Improved: Monitor and assess patient's chronic conditions and comorbid symptoms for stability, deterioration, or improvement     Problem: Neurosensory - Adult  Goal: Achieves stable or improved neurological status  Outcome: Progressing  Flowsheets (Taken 9/14/2022 0000)  Achieves stable or improved neurological status: Assess for and report changes in neurological status     Problem: Respiratory - Adult  Goal: Achieves optimal ventilation and oxygenation  Outcome: Progressing  Flowsheets (Taken 9/14/2022 0000)  Achieves optimal ventilation and oxygenation: Assess for changes in respiratory status     Problem: Cardiovascular - Adult  Goal: Absence of cardiac dysrhythmias or at baseline  Outcome: Progressing     Problem: Skin/Tissue Integrity - Adult  Goal: Skin integrity remains intact  Outcome: Progressing  Flowsheets (Taken 9/14/2022 0000)  Skin Integrity Remains Intact: Monitor for areas of redness and/or skin breakdown     Problem: Skin/Tissue Integrity - Adult  Goal: Oral mucous membranes remain intact  Outcome: Progressing  Flowsheets (Taken 9/14/2022 0000)  Oral Mucous Membranes Remain Intact: Assess oral mucosa and hygiene practices     Problem: Musculoskeletal - Adult  Goal: Maintain proper alignment of affected body part  Outcome: Progressing  Flowsheets (Taken 9/14/2022 0000)  Maintain proper alignment of affected body part: Support and protect limb and body alignment per provider's orders     Problem: Musculoskeletal - Adult  Goal: Return ADL status to a safe level of function  Outcome: Progressing  Flowsheets (Taken 9/14/2022 0000)  Return ADL Status to a Safe Level of Function: Administer medication as ordered     Problem: Gastrointestinal - Adult  Goal: Minimal or absence of nausea and vomiting  Outcome: Progressing  Flowsheets (Taken 9/14/2022 0000)  Minimal or absence of nausea and vomiting: Administer IV fluids as ordered to ensure adequate hydration     Problem: Gastrointestinal - Adult  Goal: Maintains or returns to baseline bowel function  Outcome: Progressing  Flowsheets (Taken 9/14/2022 0000)  Maintains or returns to baseline bowel function:   Assess bowel function   Encourage oral fluids to ensure adequate hydration     Problem: Gastrointestinal - Adult  Goal: Maintains adequate nutritional intake  Outcome: Progressing  Flowsheets (Taken 9/14/2022 0000)  Maintains adequate nutritional intake:   Monitor percentage of each meal consumed   Identify factors contributing to decreased intake, treat as appropriate     Problem: Gastrointestinal - Adult  Goal: Establish and maintain optimal ostomy function  Outcome: Progressing     Problem: Genitourinary - Adult  Goal: Absence of urinary retention  Outcome: Progressing  Flowsheets (Taken 9/14/2022 0000)  Absence of urinary retention:   Assess patients ability to void and empty bladder   Monitor intake/output and perform bladder scan as needed     Problem: Genitourinary - Adult  Goal: Urinary catheter remains patent  Outcome: Progressing     Problem: Infection - Adult  Goal: Absence of infection at discharge  Outcome: Progressing  Flowsheets (Taken 9/14/2022 0000)  Absence of infection at discharge:   Assess and monitor for signs and symptoms of infection   Monitor lab/diagnostic results     Problem: Infection - Adult  Goal: Absence of infection during hospitalization  Outcome: Progressing  Flowsheets (Taken 9/14/2022 0000)  Absence of infection during hospitalization:   Assess and monitor for signs and symptoms of infection   Monitor lab/diagnostic results     Problem: Infection - Adult  Goal: Absence of fever/infection during anticipated neutropenic period  Outcome: Progressing  Flowsheets (Taken 9/14/2022 0000)  Absence of fever/infection during anticipated neutropenic period: Monitor white blood cell count     Problem: Metabolic/Fluid and Electrolytes - Adult  Goal: Electrolytes maintained within normal limits  Outcome: Progressing  Flowsheets (Taken 9/14/2022 0000)  Electrolytes maintained within normal limits:   Monitor labs and assess patient for signs and symptoms of electrolyte imbalances   Administer electrolyte replacement as ordered     Problem: Metabolic/Fluid and Electrolytes - Adult  Goal: Hemodynamic stability and optimal renal function maintained  Outcome: Progressing  Flowsheets (Taken 9/14/2022 0000)  Hemodynamic stability and optimal renal function maintained:   Monitor labs and assess for signs and symptoms of volume excess or deficit   Monitor intake, output and patient weight     Problem: Metabolic/Fluid and Electrolytes - Adult  Goal: Glucose maintained within prescribed range  Outcome: Progressing  Flowsheets (Taken 9/14/2022 0000)  Glucose maintained within prescribed range:   Monitor blood glucose as ordered   Assess for signs and symptoms of hyperglycemia and hypoglycemia     Problem: Hematologic - Adult  Goal: Maintains hematologic stability  Outcome: Progressing  Flowsheets (Taken 9/14/2022 0000)  Maintains hematologic stability: Assess for signs and symptoms of bleeding or hemorrhage     Problem: ABCDS Injury Assessment  Goal: Absence of physical injury  Outcome: Progressing     Problem: Skin/Tissue Integrity  Goal: Absence of new skin breakdown  Description: 1. Monitor for areas of redness and/or skin breakdown  2. Assess vascular access sites hourly  3. Every 4-6 hours minimum:  Change oxygen saturation probe site  4. Every 4-6 hours:  If on nasal continuous positive airway pressure, respiratory therapy assess nares and determine need for appliance change or resting period.   Outcome: Progressing

## 2022-09-14 NOTE — DISCHARGE INSTR - COC
Continuity of Care Form    Patient Name: Rob Portillo   :  1935  MRN:  [id number]    Admit date:  2022  Discharge date:  09/15/2022    Code Status Order: Full Code   Advance Directives:     Admitting Physician:  No admitting provider for patient encounter. PCP: No primary care provider on file. Discharging Nurse: [facility] Unit/Room#: 1110/1110-01  Discharging Unit Phone Number: [phone]    Emergency Contact:   Extended Emergency Contact Information  Primary Emergency Contact: Ani Serna  Mobile Phone: [phone]  Relation: Spouse    Past Surgical History:  Past Surgical History:   Procedure Laterality Date    BARIATRIC SURGERY         Immunization History: There is no immunization history on file for this patient.     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 coli UTI (urinary tract infection) N39.0, B96.20       Isolation/Infection:   Isolation            No Isolation          Patient Infection Status       None to display            Nurse Assessment:  Last Vital Signs: /65   Pulse 87   Temp 98.2 °F (36.8 °C) (Temporal)   Resp 12   Ht 5' 9\" (1.753 m)   Wt 171 lb (77.6 kg)   SpO2 95%   BMI 25.25 kg/m²     Last documented pain score (0-10 scale): Pain Level: 0  Last Weight:   Wt Readings from Last 1 Encounters:   22 171 lb (77.6 kg)     Mental Status:  oriented and alert    IV Access:  - PICC - site  NATALIIA Hurd, insertion date: 09/14/2022    Nursing Mobility/ADLs:  Walking   Dependant, 2 person assist  Transfer  Dependent, 2 person assist  Bathing  Dependent  Dressing  Dependent  Toileting  Dependent  Feeding  Independent, needs set up  [address] Delivery   whole    Wound Care Documentation and Therapy:        Elimination:  Continence: Bowel: no  Bladder: Yes  Urinary Catheter: None   Colostomy/Ileostomy/Ileal Conduit: No       Date of Last BM: 9/13/22      Intake/Output Summary (Last 24 hours) at 9/14/2022 1208  Last data filed at 9/14/2022 0610  Gross per 24 hour   Intake 1828.49 ml   Output 400 ml   Net 1428.49 ml     I/O last 3 completed shifts: In: 1828.5 [I.V.:1778.5; IV Piggyback:50]  Out: 200 [Urine:975]    Safety Concerns:     Fall risk    Impairments/Disabilities:      Hard of Hearing, no hearing aides    Nutrition Therapy:  Current Nutrition Therapy:   - Oral Diet:  General and Low Sodium (2gm)    Routes of Feeding: Oral  Liquids: No Restrictions  Daily Fluid Restriction: no  Last Modified Barium Swallow with Video (Video Swallowing Test): not done    Treatments at the Time of Hospital Discharge:   Respiratory Treatments: none  Oxygen Therapy:  is not on home oxygen therapy.   Ventilator:    - No ventilator support    Rehab Therapies: Physical Therapy and Occupational Therapy  Weight Bearing Status/Restrictions: No weight bearing restrictions  Other Medical Equipment (for information only, NOT a DME order):  walker, wheelchair  Other Treatments: IV Rocephin antibiotics    Patient's personal belongings (please select all that are sent with patient):  Dentures upper, partial plate at home with wife, glasses at home for reading, shirt    RN SIGNATURE:  Electronically signed by Gregary Runner, RN on 9/15/22 at 11:39 AM EDT    CASE MANAGEMENT/SOCIAL WORK SECTION    Inpatient Status Date: ***    Readmission Risk Assessment Score:  Readmission Risk              Risk of Unplanned Readmission:  17           Discharging to Facility/ Agency   Name:You are discharging to a [facility]    Name:       [facility]  Address:    [address] [place], [address]  Phone:  [phone]  Fax:  [phone]    Address:  Phone:  Fax:    Dialysis Facility (if applicable)   Name:  Address:  Dialysis Schedule:  Phone:  Fax:    / signature: Electronically signed by EJ Porter on 9/15/22 at 12:49 PM EDT    PHYSICIAN SECTION    Prognosis: Fair    Condition at Discharge: Stable    Rehab Potential (if transferring to Rehab): Fair    Recommended Labs or Other Treatments After Discharge: BMP in 3 days, send results to nephrology Dr. Yuri Erwin, continue IV antibiotics as directed by ID    CBC, BMP weekly every Monday to Dr. Kevin Casey, [phone]    Follow up with Dr. Kevin Casey in 3-4 weeks, [phone]      Physician Certification: I certify the above information and transfer of Breanne Reynaga  is necessary for the continuing treatment of the diagnosis listed and that he requires [facility] for greater 30 days.      Update Admission H&P: No change in H&P    PHYSICIAN SIGNATURE:  Electronically signed by Mary Skaggs MD on 9/14/22 at 12:09 PM EDT09/15/202

## 2022-09-14 NOTE — PROGRESS NOTES
[facility]  Office: [address], DO, Katelynn Darby, DO, Elijah Frausto, DO, Trisha Marques Blood, DO, Lenin Astudillo MD, Anu Otto MD, Denisa Steel MD, Jeremy Hagan MD,  Khai Carolina MD, Rhonda Phillip MD, Nevaeh Stevens, DO, Sathya Damon MD,  Ricardo Garland MD, Veronika Chung MD, Bryce Burnett DO, Leeann Phan MD, Derek Valenzuela MD, Linda Rutherford MD, Collette Hahn, MD, Nicky Whyte MD, Breana Santos MD, Diya Perales DO, Gamaliel Meeks MD, Shaunna Crespo MD, Katelynn Johnson, CNP,  Neal Singh, CNP, Jose Tello, CNP, Yan Boyle, CNP,  Jenni June, [facility], Brian Mortensen, CNP, Gabino Rob, CNP, Misty Lovell, CNP, Haydee Cadena, CNP, Abbey Canales, CNP, Baljinder Vallejo PA-C, Dorothy Pickard, CNS, Ross Shanks, [facility], Dolores Olson, CNP, Dorothy Montaño, CNP, Renee Esquivel, [id number]    Progress Note    9/14/2022    11:55 AM    Name:   Tia Lim  MRN:     [id number]     Acct:      [de-identified]   Room:   [address] [place] [address] Day:  6  Admit Date:  9/8/2022 12:37 PM    PCP:   No primary care provider on file.   Code Status:  Full Code    Subjective:     C/C:   Chief Complaint   Patient presents with    Altered Mental Status     Interval History Status: .   Denies any new problem  Kidney function is plateauing, current creatinine 2.72 (baseline 1.8-2)  Medeiros's cath was removed per nephrology request  AFP is high-93.6-GI considers this to be possibly due to hepatic lobe infection, plans to recheck later as outpatient after finishing antibiotics  MRI abdomen shows possible early abscesses and left and right hepatic lobe, ID recommends antibiotics to be continued for 6 weeks    Physical therapy recommending SNF, has been accepted there      Brief History:     Tia Lim is 80 y.o. male  Who was admitted to the hospital on 9/8/2022 for treatment of Acute kidney injury superimposed on CKD ([facility] [address] Patient was brought to emergency room by ambulance after his wife called for patients change in mental status. Apparently patient overdosed on his sleep medication. He took medication within intent to fall asleep faster. Patient also reported that he has known history of arthritis and has been having difficulty with his joints. He was not moving much for last few days. He reports prior history of morbid obesity and weighted > 350 pounds. He underwent bariatric surgery > 25 years ago. He has prior history of diabetes mellitus however is not on any medication. Patient denies any other past medical history. On chart review it appears that patient has been admitted to [facility] with acute kidney injury and had elevated creatinine up to 3.5. Patient is very hard of hearing , he lives with his wife of > 35 Yrs. Patient denies any history of diarrhea, vomiting, nausea, fever. He denies any urinary difficulties, nocturia, retention. Initial evaluation emergency room showed hypotension with blood pressure 88/66 mmHg, blood glucose was significantly low 30's on first contact with EMS and repeat 60 on arrival to emergency room after dextrose. Lab evaluation showed hyponatremia 127, BUN 70, creatinine 4.34, bicarb 17, lactic acid 2.7, magnesium 1.3, troponin 58, albumin 1.8, AST 47, ALT 24, bilirubin 4.9. Alcohol levels negative. Urine tox was negative for cannabinoids, cocaine, methadone, fentanyl, oxycodone, amphetamine. Patient was found to have significant leukocytosis 49.3 with low PLT 96 .  CT abdomen pelvis showed diffuse wall thickening in ascending, transverse colon suggestive of colitis, bladder wall thickening possible cystitis, suboptimal evaluation of gallbladder fossa. Patient has large right upper quadrant open surgical scar in place. Unsure about gallbladder status. He also has large midline surgical scar which she reports that is from bariatric surgery.     Review of Systems:     Constitutional:  negative for chills, fevers, sweats, feels better than before  Respiratory:  negative for cough, dyspnea on exertion, shortness of breath, wheezing  Cardiovascular:  negative for chest pain, chest pressure/discomfort, lower extremity edema, palpitations  Gastrointestinal:  negative for abdominal pain, constipation, diarrhea, nausea, vomiting  Neurological:  negative for dizziness, headache    Medications: Allergies: Allergies   Allergen Reactions    Cefazolin Rash     maculopapular rash over his face, chest, torso, and back. Pt tolerated ceftriaxone during 2022 admission. Current Meds:   Scheduled Meds:    pantoprazole  40 mg Oral QAM AC    cefTRIAXone (ROCEPHIN) IV  1,000 mg IntraVENous Q24H    midodrine  10 mg Oral TID WC    sodium chloride flush  5-40 mL IntraVENous 2 times per day     Continuous Infusions:    sodium chloride 50 mL/hr at 22 0610    norepinephrine Stopped (09/10/22 1312)    dextrose      sodium chloride Stopped (22 1432)     PRN Meds: glucose, dextrose bolus **OR** dextrose bolus, glucagon (rDNA), dextrose, sodium chloride flush, sodium chloride, ondansetron **OR** ondansetron, acetaminophen **OR** acetaminophen    Data:     Past Medical History:   has a past medical history of Chronic back pain, Cirrhosis of liver without ascites ([facility] [address] and Diabetes mellitus ([facility] [address] Social History:   reports that he does not currently use alcohol. Family History:   Family History   Problem Relation Age of Onset    Heart Attack Sister        Vitals:  /65   Pulse 87   Temp 98.2 °F (36.8 °C) (Temporal)   Resp 12   Ht 5' 9\" (1.753 m)   Wt 171 lb (77.6 kg)   SpO2 95%   BMI 25.25 kg/m²   Temp (24hrs), Av.7 °F (36.5 °C), Min:97.5 °F (36.4 °C), Max:98.2 °F (36.8 °C)    Recent Labs     22  1107 22  1655 22  1923 22  0325   POCGLU 108 101 91 79         I/O (24Hr):     Intake/Output Summary (Last 24 hours) at 2022 [address] filed at 9/14/2022 0610  Gross per 24 hour   Intake 1828.49 ml   Output 400 ml   Net 1428.49 ml         Labs:  Hematology:  Recent Labs     09/12/22  0515 09/13/22  0812   WBC  --  13.8*   RBC  --  3.55*   HGB  --  10.7*   HCT  --  30.6*   MCV  --  86.2   MCH  --  30.1   MCHC  --  35.0*   RDW  --  17.0*   PLT  --  60*   MPV  --  8.8   INR 1.6  --        Chemistry:  Recent Labs     09/12/22  0515 09/13/22  0344 09/14/22  0354   * 131* 132*   K 4.8 5.3 4.7   CL 99 99 99   CO2 22 22 21   GLUCOSE 85 85 95   BUN 52* 50* 43*   CREATININE 3.17* 2.74* 2.72*   ANIONGAP 9 10 12   LABGLOM 19* 22* 22*   GFRAA 23* 27* 27*   CALCIUM 7.6* 8.1* 8.0*       Recent Labs     09/12/22  0515 09/12/22  0732 09/12/22 2021 09/13/22  0758 09/13/22  1107 09/13/22  1655 09/13/22  1923 09/14/22  0325   PROT 5.5*  --   --   --   --   --   --   --    LABALBU 1.5*  --   --   --   --   --   --   --    AST 49*  --   --   --   --   --   --   --    ALT 23  --   --   --   --   --   --   --    ALKPHOS 237*  --   --   --   --   --   --   --    BILITOT 3.1*  --   --   --   --   --   --   --    BILIDIR 2.7*  --   --   --   --   --   --   --    POCGLU  --    < > 96 73* 108 101 91 79    < > = values in this interval not displayed. ABG:No results found for: POCPH, PHART, PH, POCPCO2, KQS6UTE, PCO2, POCPO2, PO2ART, PO2, POCHCO3, PAI3XRA, HCO3, NBEA, PBEA, BEART, BE, THGBART, THB, UFQ2JMK, BWOT5MGC, E9WOCECW, O2SAT, FIO2  Lab Results   Component Value Date/Time    SPECIAL RIGHT HAND, 3ML 09/12/2022 11:25 AM     Lab Results   Component Value Date/Time    CULTURE NO GROWTH 1 DAY 09/12/2022 11:25 AM       Radiology:  CT HEAD WO CONTRAST    Result Date: 9/8/2022  No acute intracranial abnormality. Prominent CSF spaces over the frontal and parietal lobes. No evidence of hydrocephalus. US GALLBLADDER RUQ    Result Date: 9/9/2022  1. Technically limited exam.  The gallbladder is not clearly identified.  Based on recent CT exam, the gallbladder was relatively contracted with intermediate density fluid and stones. 2.  No evidence for biliary dilatation. 3.  Morphologic findings suggestive of cirrhosis with small perihepatic ascites and probable cyst.     XR CHEST PORTABLE    Result Date: 9/9/2022  1. Right internal jugular line terminates at the superior caval atrial junction. 2.  Shallow inflation with more prominent perihilar opacities. Similar appearance of peribronchial wall thickening. While this in part may represent atelectasis, developing edema should be considered. XR CHEST 1 VIEW    Result Date: 9/8/2022  Suboptimal evaluation secondary to patient positioning/rotation. No radiographic evidence of an acute cardiopulmonary process. MRI ABDOMEN WO CONTRAST MRCP    Result Date: 9/12/2022  Motion limited study, limiting evaluation of the bile ducts. Ill-defined T2 hyperintense nodular foci are seen in the left hepatic lobe and right hepatic lobe, not clearly simple cysts, presumably due to early abscesses given the history of sepsis with metastatic disease considered a less likely etiology. Cirrhosis and splenomegaly Findings of fluid overload denoted by bilateral pleural effusions, body wall anasarca, and abdominal and pelvic ascites Cholelithiasis     US RETROPERITONEAL COMPLETE    Result Date: 9/8/2022  Unremarkable ultrasound of the kidneys and urinary bladder. CT CHEST ABDOMEN PELVIS WO CONTRAST    Result Date: 9/8/2022  1. Diffuse wall thickening of the ascending and transverse colon may represent colitis. 2. Questionable rectal wall thickening may represent proctitis. 3. Suspected cholelithiasis. Suboptimal evaluation of gallbladder fossa. Can correlate with right upper quadrant ultrasound. 4. Mild bladder wall thickening may be related to underdistension versus possible cystitis. Can correlate with urinalysis. 5. Small amount of free fluid present around the liver and spleen.  6. Mild bilateral lower lobe patchy opacities are favored to represent atelectasis. However, an early inflammatory/infectious process is not excluded in the proper clinical setting. 7. Diffuse subcutaneous fat stranding may related to fluid overload. 8. Hypodense blood pool, may be related to the anemia. Physical Examination:        General appearance:  alert, cooperative and no distress  Mental Status:  oriented to person, place and time and normal affect  Lungs:  clear to auscultation bilaterally, normal effort  Heart:  regular rate and rhythm, no murmur  Abdomen:  soft, nontender, nondistended, normal bowel sounds, no masses, hepatomegaly, splenomegaly  Extremities:  no edema, redness, tenderness in the calves  Skin:  no gross lesions, rashes, induration    Assessment:        Hospital Problems             Last Modified POA    * (Principal) Acute kidney injury superimposed on CKD ([address] 9/8/2022 Yes    Hypoglycemia 9/8/2022 Yes    Thrombocytopenia ([address] 9/8/2022 Yes    Leukocytosis 9/8/2022 Yes    Hyponatremia 9/8/2022 Yes    Hypokalemia 9/8/2022 Yes    Hypotension 9/8/2022 Yes    Colitis 9/8/2022 Yes    Acute cystitis with hematuria 9/8/2022 Yes    Proctitis 9/8/2022 Yes    Cholelithiasis 9/8/2022 Yes    Hypomagnesemia 9/8/2022 Yes    Altered mental status 9/8/2022 Yes    Elevated LFTs 9/10/2022 Yes    Abdominal pain 9/10/2022 Yes    Cirrhosis of liver without ascites ([address] 9/11/2022 Yes    Bacteremia due to Escherichia coli 9/11/2022 Yes    E. coli UTI (urinary tract infection) 9/11/2022 Yes    CKD (chronic kidney disease) 9/11/2022 Yes    Overview Signed 9/11/2022 11:09 AM by Matt Kaye MD     From DM2, baseline 1.8-2.0          Plan:        Acute renal failure likely has underlying chronic kidney disease stage III. Unsure of his  baseline likely 1.8-2.0.  treated with IV fluids with bicarb infusion,  Kidney ultrasound does not show any evidence of medical renal disease.   Likely has ischemic ATN from hypotension and dehydration-currently creatinine around 2.7, appears to be plateauing. E. coli bacteremia with sepsis secondary to UTI- Rocephin 2 Gm daily   Suspected hepatic abscesses and right and left lobe-continue IV antibiotic for 6 weeks per ID. Underlying type 2 diabetes mellitus with hypoglycemia - Not on medications.    Acute Cystitis - On antibiotics as above  Drug overdose-accidental -resolved  Moderate malnutrition -   Repeat BMP in 3 days and send results to nephrology-  Discharge to [facility] today       Mason Hess MD  9/14/2022  11:55 AM

## 2022-09-14 NOTE — PROGRESS NOTES
End Of Shift Note  [facility] ICU  Summary of shift: Patient rested comfortably throughout shift. Changed bed pad and placed mepilex on coccyx since there is a small skin tear. Vitals:    Vitals:    09/14/22 0200 09/14/22 0300 09/14/22 0400 09/14/22 0500   BP: (!) 110/39 112/65 112/73 103/64   Pulse: 90 90 (!) 104 93   Resp: 15 23 15 15   Temp:   98.2 °F (36.8 °C)    TempSrc:   Temporal    SpO2: 96% 97% 95% 95%   Weight:       Height:            I&O:   Intake/Output Summary (Last 24 hours) at 9/14/2022 0555  Last data filed at 9/13/2022 2336  Gross per 24 hour   Intake 980.93 ml   Output 675 ml   Net 305.93 ml       Resp Status:  On room air     Ventilator Settings:     / / /     Critical Care IV infusions:   sodium chloride 50 mL/hr at 09/14/22 0309    norepinephrine Stopped (09/10/22 1312)    dextrose      sodium chloride Stopped (09/11/22 1432)        LDA:   CVC Triple Lumen 09/09/22 Right Internal jugular (Active)   Number of days: 4       Peripheral IV 09/09/22 Distal;Left Forearm (Active)   Number of days: 4

## 2022-09-14 NOTE — PROGRESS NOTES
[facility]  Progress Note    Sarah Townsend  MRN: [id number]  Date: 9/14/2022  LOS: 6     Reason for F/U :   E coli sepsis    Impression :   E. coli sepsis secondary to E. coli urinary tract infection  Left and right hepatic lobe nodular foci presumably due to early abscess  Colitis by CT imaging  Leukocytosis  Acute kidney injury  Cirrhosis and splenomegaly  Diabetes mellitus type 2  Chronic back pain    Recommendations:   Continue IV antimicrobial therapy with ceftriaxone and the plan is to continue for 6 weeks to treat for the potential hepatic abscesses  Patient is okay for discharge from an infectious disease standpoint  I will order a PICC line  He will need weekly labs while on IV antibiotics  He will need follow-up imaging of the liver either by CT or MRI in the near future  He will need to follow up in the ID clinic with Dr. Lizandro Cao    Infection Control Recommendations:   Universal precautions    Discharge Planning:   Estimated Length of IV antimicrobials: 6 weeks, 10/19/2022  Patient will need PICC line Insertion: yes  Patient will need: SNF  Patient willneed outpatient wound care: No    Medical Decision making / Summary of Stay:   Sarah Townsend is a 80y.o.-year-old male who was initially admitted on 9/8/2022. Patient has known medical history of chronic back pain and diabetes mellitus type 2. Patient was brought to the emergency department at the discretion of his wife for altered mental status. He apparently took too many sleeping pills, stating that he wanted to fall asleep extra fast.  Upon arrival, blood sugars were in the 30s he did receive dextrose. Urinalysis with small amounts of leukoesterase, many bacteria. CT abdomen with diffuse wall thickening of the ascending and transverse colon which may represent colitis, questionable rectal wall thickening which may represent proctitis, bladder wall thickening which may represent cystitis.   The patient denies any abdominal pain independently reviewed the followinglabs:  CBC with Differential:   Recent Labs     09/13/22  0812   WBC 13.8*   HGB 10.7*   HCT 30.6*   PLT 60*   LYMPHOPCT 7*   MONOPCT 7     BMP:   Recent Labs     09/13/22  0344 09/14/22  0354   * 132*   K 5.3 4.7   CL 99 99   CO2 22 21   BUN 50* 43*   CREATININE 2.74* 2.72*     Hepatic Function Panel:   Recent Labs     09/12/22  0515   PROT 5.5*   LABALBU 1.5*   BILIDIR 2.7*   IBILI 0.4   BILITOT 3.1*   ALKPHOS 237*   ALT 23   AST 49*         No results found for: PROCAL  No results found for: CRP  No results found for: SEDRATE      No results found for: DDIMER  No results found for: FERRITIN  No results found for: LDH  No results found for: FIBRINOGEN    No results found for requested labs within last 30 days. No results found for: COVID19    No results for input(s): VANCOTROUGH in the last 72 hours. Imaging Studies:   No new imaging    Cultures:     Culture, Blood 1 [[id number]] Collected: 09/12/22 1125   Order Status: Completed Specimen: Blood Updated: 09/13/22 1423    Specimen Description . BLOOD    Special Requests RIGHT HAND, 3ML    Culture NO GROWTH 1 DAY   Culture, Blood 1 [[id number]] Collected: 09/12/22 1028   Order Status: Completed Specimen: Blood Updated: 09/13/22 1231    Specimen Description . BLOOD    Special Requests LEFT HAND, 10ML    Culture NO GROWTH 1 DAY     Component 9/8/22 4887    Specimen Description . CLEAN CATCH URINE    Culture ESCHERICHIA COLI >[id number] CFU/ML Abnormal     Resulting [address]        Susceptibility    Escherichia coli (1)    Antibiotic Interpretation Microscan Method Status    ampicillin Sensitive 4 BACTERIAL SUSCEPTIBILITY PANEL SANDRA Final    aztreonam Sensitive <=1 BACTERIAL SUSCEPTIBILITY PANEL SANDRA Final    ceFAZolin Sensitive <=4 BACTERIAL SUSCEPTIBILITY PANEL SANDRA Final     Cefazolin sensitivity results can be used to predict the effectiveness of oral   cephalosporins (eg.  Cephalexin) in uncomplicated Specimen Description . BLOOD    Special Requests RFA 12ML    Culture POSITIVE Blood Culture Abnormal      DIRECT GRAM STAIN FROM BOTTLE: GRAM NEGATIVE RODS     Filmarray BCID Detected: Escherichia coli Detected: Methodology- Polymerase Chain Reaction (PCR)     ESCHERICHIA COLI Abnormal      (NOTE) Direct Gram Stain from bottle and Polymerase Chain Reaction (PCR) results called to and read back by:JORDAN ORTIZ 9/9/22 @0420     Medications:      pantoprazole  40 mg Oral QAM AC    cefTRIAXone (ROCEPHIN) IV  1,000 mg IntraVENous Q24H    midodrine  10 mg Oral TID WC    sodium chloride flush  5-40 mL IntraVENous 2 times per day           [facility]  Susana Patel, [address] messaging  OFFICE: [phone]      Electronically signed by DIONICIO Stuart - CNP on 9/14/2022 at 9:04 AM  Thank you for allowing us to participate in the care of this patient. Please call with questions. This note iscreated with the assistance of a speech recognition program.  While intending to generate a document that actually reflects the content of the visit, the document can still have some errors including those of syntax andsound a like substitutions which may escape proof reading. In such instances, actual meaning can be extrapolated by contextual diversion.

## 2022-09-14 NOTE — PROGRESS NOTES
Physical Therapy  Facility/Department: [facility] ICU  Daily Treatment Note  NAME: Karma Monk  : 1935  MRN: [id number]    Date of Service: 2022    Discharge Recommendations:  Patient would benefit from continued therapy after discharge    Pt currently functioning below baseline. Recommend daily inpatient skilled therapy at time of discharge to maximize long term outcomes and prevent re-admission. Please refer to AM-PAC score for current level of function. Patient Diagnosis(es): The primary encounter diagnosis was Altered mental status, unspecified altered mental status type. Diagnoses of Hypoglycemia, Hypocalcemia, Hypokalemia, Hypomagnesemia, Hyponatremia, Acute kidney injury superimposed on CKD ([facility] [address] Lactic acidosis, Acute cystitis with hematuria, Colitis, Proctitis, Calculus of gallbladder without cholecystitis without obstruction, and Septic shock ([facility] [address] were also pertinent to this visit. Assessment   Assessment: Patient initially began treatment well with improve bed mobility and seated balance, able to transfer to commode with Max x 2 A, but very unsafe then required use of Wynell Jetmore stedy from commode to recliner. Max-Dep x 2 A for STS within sarah stedy. Activity Tolerance: Patient limited by endurance; Patient limited by fatigue     Plan    Plan  Plan: 5-7 times per week  Current Treatment Recommendations: Strengthening; Functional mobility training;Balance training;Transfer training;Neuromuscular re-education;Gait training; Endurance training;Pain management;Home exercise program;Safety education & training;Patient/Caregiver education & training;Equipment evaluation, education, & procurement; Therapeutic activities; Positioning;ADL/Self-care training     Restrictions  Restrictions/Precautions  Restrictions/Precautions: General Precautions, Fall Risk, Up as Tolerated  Required Braces or Orthoses?: No  Position Activity Restriction  Other position/activity restrictions: LUE IV, R jugular line, up w/ assist     Subjective    Subjective  Subjective: RN and pt agreeable to therapy. Pt supine in bed upon arrival.  Pt requiring encouragement throughout. Orientation  Overall Orientation Status: Impaired  Orientation Level: Disoriented to situation;Oriented to place;Oriented to time;Oriented to person  Cognition  Overall Cognitive Status: Exceptions  Arousal/Alertness: Appropriate responses to stimuli;Delayed responses to stimuli;Inconsistent responses to stimuli  Following Commands: Follows one step commands with increased time; Follows one step commands with repetition; Inconsistently follows commands; Does not follow commands  Attention Span: Attends with cues to redirect; Difficulty attending to directions; Difficulty dividing attention  Memory: Decreased short term memory;Decreased recall of recent events;Decreased long term memory  Safety Judgement: Decreased awareness of need for assistance;Decreased awareness of need for safety  Problem Solving: Decreased awareness of errors;Assistance required to identify errors made;Assistance required to generate solutions;Assistance required to implement solutions;Assistance required to correct errors made  Insights: Not aware of deficits  Initiation: Requires cues for all  Sequencing: Requires cues for all  Cognition Comment: Pt easily agitated and resistive to participation in functional tasks. Objective   Bed Mobility Training  Bed Mobility Training: Yes  Overall Level of Assistance: Minimum assistance;Assist X2;Additional time  Interventions: Verbal cues; Tactile cues; Safety awareness training;Weight shifting training/pressure relief  Rolling: Minimum assistance;Assist X2;Additional time  Supine to Sit:  (Unable to assess.  Patient remained up in chair)  Scooting: Minimum assistance;Assist X2;Additional time  Balance  Sitting: High guard  Standing: Impaired  Standing - Static: Constant support;Poor  Standing - Dynamic: Constant support;Poor  Transfer Training  Transfer visits  Short term goal 1: Pt to demonstrate bed mobility Karley  Short term goal 2: Pt to perform STS transfers w/ RW Chandana  Short term goal 3: Pt to ambulate at least 20ft w/ RW ModA  Short term goal 4: Pt to actively participate in at least 30 minutes of physical therapy for ther act, ther ex, balance, gait, and endurance training  Short term goal 5: Pt to be indep w/ pressure relief techniques in order to prevent pressure injuries  Patient Goals   Patient goals : \"To take a nap\"    Education  Patient Education  Education Given To: Patient  Education Provided: Role of Therapy;Plan of Care;Energy Conservation;Transfer Training;Equipment  Education Provided Comments: Tranfer training, safety with RW and Abdullahi watkins, pursed lip breahting, fall risk pervention, proper posture  Education Method: Verbal  Barriers to Learning: Cognition  Education Outcome: Continued education needed    Therapy Time   Individual Concurrent Group Co-treatment   Time In       1025   Time Out       1104   Minutes       44       Co-treatment with OT warranted secondary to decreased safety and independence requiring 2 skilled therapy professionals to address individual discipline's goals. PT addressing pre gait trunk strengthening, weight shifting prior to transfers, transfer training, and postural control in sitting/standing.      Berry Forrester, PTA

## 2022-09-14 NOTE — PROGRESS NOTES
Renal Progress Note    Patient :  Anil Carbone; 80 y.o. MRN# [id number]  Location:    Attending:  Lenn Rubinstein, MD  Admit Date:  2022   Hospital Day: 6    Subjective:     Patient was seen and examined. No new issues reported overnight. Seems to be doing little better, no nausea vomiting diarrhea reported. Serum creatinine seems to be plateauing at 2.7, creatinine 2.7 to mg/dl today. Sodium 132, potassium 4.7, chloride 99, bicarb 27, BUN 43, calcium 8.0. Urine output documented as about 475 cc in the last 24 hours. Currently on normal saline at 50 cc an hour. Blood culture 2022: Positive for E. coli. Urine culture 2022 positive for E. coli as well. Outpatient Medications:     No medications prior to admission. Current Medications:     Scheduled Meds:    pantoprazole  40 mg Oral QAM AC    cefTRIAXone (ROCEPHIN) IV  1,000 mg IntraVENous Q24H    midodrine  10 mg Oral TID WC    sodium chloride flush  5-40 mL IntraVENous 2 times per day     Continuous Infusions:    sodium chloride 50 mL/hr at 22 0610    norepinephrine Stopped (09/10/22 1312)    dextrose      sodium chloride Stopped (22 1432)     PRN Meds:  glucose, dextrose bolus **OR** dextrose bolus, glucagon (rDNA), dextrose, sodium chloride flush, sodium chloride, ondansetron **OR** ondansetron, acetaminophen **OR** acetaminophen    Input/Output:       I/O last 3 completed shifts: In: 1828.5 [I.V.:1778.5; IV Piggyback:50]  Out: 975 [Urine:975]. No data found. Vital Signs:   Temperature:  Temp: 98.2 °F (36.8 °C)  TMax:   Temp (24hrs), Av.7 °F (36.5 °C), Min:97.5 °F (36.4 °C), Max:98.2 °F (36.8 °C)    Respirations:  Resp: 12  Pulse:   Heart Rate: 87  BP:    BP: 115/65  BP Range: Systolic (99IPL), KO , Min:101 , GDA:713       Diastolic (07TEX), HNS:82, Min:39, Max:73      Physical Examination:     General:  AAO x 3, speaking in full sentences, no accessory muscle use.   HEENT: Atraumatic, normocephalic, no throat congestion, moist mucosa. Eyes:   Pupils equal, round and reactive to light, EOMI. Neck:   Supple  Chest:   Bilateral vesicular breath sounds, no rales or wheezes. Cardiac:  S1 S2 RR, no murmurs, gallops or rubs. Abdomen: Soft, non-tender, no masses or organomegaly, BS audible. :   No suprapubic or flank tenderness. Neuro:  AAO x 3, No FND. SKIN:  No rashes, good skin turgor. Extremities:  No edema. Labs:       Recent Labs     09/13/22  0812   WBC 13.8*   RBC 3.55*   HGB 10.7*   HCT 30.6*   MCV 86.2   MCH 30.1   MCHC 35.0*   RDW 17.0*   PLT 60*   MPV 8.8      BMP:   Recent Labs     09/12/22  0515 09/13/22  0344 09/14/22  0354   * 131* 132*   K 4.8 5.3 4.7   CL 99 99 99   CO2 22 22 21   BUN 52* 50* 43*   CREATININE 3.17* 2.74* 2.72*   GLUCOSE 85 85 95   CALCIUM 7.6* 8.1* 8.0*     Albumin:    Recent Labs     09/12/22  0515   LABALBU 1.5*     SPEP:  Lab Results   Component Value Date/Time    PROT 5.5 09/12/2022 05:15 AM     Hep BsAg:         Lab Results   Component Value Date/Time    HEPBSAG NONREACTIVE 09/13/2022 08:12 AM     Hep C AB:          Lab Results   Component Value Date/Time    HEPCAB NONREACTIVE 09/13/2022 08:12 AM       Urinalysis/Chemistries:      Lab Results   Component Value Date/Time    NITRU NEGATIVE 09/08/2022 03:33 PM    COLORU Dark Yellow 09/08/2022 03:33 PM    PHUR 5.5 09/08/2022 03:33 PM    WBCUA 5 TO 10 09/08/2022 03:33 PM    RBCUA 20 TO 50 09/08/2022 03:33 PM    MUCUS NOT REPORTED 07/31/2019 07:58 AM    TRICHOMONAS NOT REPORTED 07/31/2019 07:58 AM    YEAST NOT REPORTED 07/31/2019 07:58 AM    BACTERIA MANY 09/08/2022 03:33 PM    SPECGRAV 1.015 09/08/2022 03:33 PM    LEUKOCYTESUR SMALL 09/08/2022 03:33 PM    UROBILINOGEN Normal 09/08/2022 03:33 PM    BILIRUBINUR  09/08/2022 03:33 PM     Presumptive positive. Unable to confirm due to unavailability of reagent.     GLUCOSEU NEGATIVE 09/08/2022 03:33 PM    KETUA TRACE 09/08/2022 03:33 PM    AMORPHOUS NOT REPORTED 07/31/2019 07:58 AM     Urine Sodium:     Lab Results   Component Value Date/Time    SIMONE <20 09/08/2022 06:40 PM     Urine Osmolarity:   Lab Results   Component Value Date/Time    OSMOU 207 09/08/2022 06:40 PM      Urine Creatinine:     Lab Results   Component Value Date/Time    LABCREA 42.2 09/10/2022 01:30 PM     Radiology:     Reviewed. Assessment:     Acute Kidney Injury secondary to ATN from shock, hypotension requiring pressor support, low flow state, decreased intake-now plateauing. Baseline creatinine seems to be around 1.8-2 mg/dl. Peak creatinine 4.3 mg/dl this admission. E. coli sepsis. Hypotension requiring pressor support. Bacteremia with Blood culture 9/8/2022: Positive for E. coli. UTI with Urine culture 9/8/2022 positive for E. Coli. Colitis and proctitis. Diastolic dysfunction. Mild aortic stenosis. Right and left hepatic lobe nodular foci presumed early abscess on antibiotics per ID. MRCP positive for cirrhosis and liver abscess as per GI. Cirrhosis and splenomegaly. Plan:   Continue IV fluids normal saline at 50 cc an hour. Continue to monitor strict I's and O's renal function. Continue ProAmatine. Antibiotics as per infectious diseases. BMP in AM.  Will follow. Nutrition   Please ensure that patient is on a renal diet/TF. Avoid nephrotoxic drugs/contrast exposure. Celestino Cool MD  [facility]     This note is created with the assistance of a speech-recognition program. While intending to generate a document that actually reflects the content of the visit, no guarantees can be provided that every mistake has been identified and corrected by editing.

## 2022-09-14 NOTE — PROGRESS NOTES
Pulmonary Critical Care Progress Note  Leda Singh MD     Patient seen for the follow up of abscess, bilateral pleural effusions/atelectasis, Acute kidney injury superimposed on CKD ([address]     Subjective:  Patient is resting comfortably in bed, no distress. He is on room air saturating 95%. He denies shortness of breath, cough or chest pain. He just had PICC line placed so has a little discomfort in his arm at this time but otherwise does not have any complaints. Examination:  Vitals: /65   Pulse 87   Temp 98.4 °F (36.9 °C) (Temporal)   Resp 12   Ht 5' 9\" (1.753 m)   Wt 171 lb (77.6 kg)   SpO2 95%   BMI 25.25 kg/m²   General appearance: alert and cooperative with exam  Neck: No JVD  Lungs: Moderate air exchange, no crackles or wheezing  Heart: regular rate and rhythm, S1, S2 normal, no gallop  Abdomen: Soft, non tender, + BS  Extremities: no cyanosis or clubbing.  No significant edema    LABs:  CBC:   Recent Labs     09/13/22  0812   WBC 13.8*   HGB 10.7*   HCT 30.6*   PLT 60*     BMP:   Recent Labs     09/13/22  0344 09/14/22  0354   * 132*   K 5.3 4.7   CO2 22 21   BUN 50* 43*   CREATININE 2.74* 2.72*   LABGLOM 22* 22*   GLUCOSE 85 95     PT/INR:   Recent Labs     09/12/22  0515   PROTIME 18.6*   INR 1.6     LIVER PROFILE:  Recent Labs     09/12/22  0515   AST 49*   ALT 23   LABALBU 1.5*     Radiology:  X-ray chest 9/9/2022      Impression:  Septic shock/UTI/cholangitis  Bilateral pleural effusions/atelectasis  Right hepatic lobe nodular foci likely secondary to early abscess/Elevated AFP/liver cirrhosis  Acute renal failure/metabolic acidosis/hyponatremia/hypocalcemia  Altered mental status  Diabetes mellitus/recurrent hypoglycemia secondary to sepsis  Levophed subcutaneous infiltration    Recommendations:  Oxygen via nasal cannula, if necessary to keep SPO2 90% or greater  Incentive spirometry every hour while awake  DuoNeb every 4 hours as needed  Rocephin per infectious disease for 6 weeks  ProAmatine 10 mg 3 times daily/monitor BP off of vasopressors  Nephrology following  GI on consult, s/p MRCP/outpatient colonoscopy to evaluate rectal wall thickening as an outpatient  Palliative care on consult  PT/OT  GI prophylaxis, Protonix  DVT prophylaxis with EPCs  Discharge planning to SNF when all arrangements can be made. No objection from pulmonary standpoint.   Will follow with you  Electronically signed by     Diego Briones MD on 9/14/2022 at 6:11 PM  Pulmonary Critical Care and Sleep Medicine,  [facility]  Cell: [phone]  Office: [phone]

## 2022-09-15 VITALS
WEIGHT: 171 LBS | HEIGHT: 69 IN | BODY MASS INDEX: 25.33 KG/M2 | HEART RATE: 94 BPM | DIASTOLIC BLOOD PRESSURE: 85 MMHG | SYSTOLIC BLOOD PRESSURE: 150 MMHG | TEMPERATURE: 98.2 F | OXYGEN SATURATION: 94 % | RESPIRATION RATE: 17 BRPM

## 2022-09-15 LAB
ABSOLUTE EOS #: 0.13 K/UL (ref 0–0.44)
ABSOLUTE IMMATURE GRANULOCYTE: 0.2 K/UL (ref 0–0.3)
ABSOLUTE LYMPH #: 0.93 K/UL (ref 1.1–3.7)
ABSOLUTE MONO #: 0.7 K/UL (ref 0.1–1.2)
ANION GAP SERPL CALCULATED.3IONS-SCNC: 9 MMOL/L (ref 9–17)
BASOPHILS # BLD: 1 % (ref 0–2)
BASOPHILS ABSOLUTE: 0.06 K/UL (ref 0–0.2)
BUN BLDV-MCNC: 37 MG/DL (ref 8–23)
BUN/CREAT BLD: 16 (ref 9–20)
CALCIUM SERPL-MCNC: 7.8 MG/DL (ref 8.6–10.4)
CHLORIDE BLD-SCNC: 102 MMOL/L (ref 98–107)
CO2: 23 MMOL/L (ref 20–31)
CREAT SERPL-MCNC: 2.25 MG/DL (ref 0.7–1.2)
EOSINOPHILS RELATIVE PERCENT: 1 % (ref 1–4)
GFR AFRICAN AMERICAN: 34 ML/MIN
GFR NON-AFRICAN AMERICAN: 28 ML/MIN
GFR SERPL CREATININE-BSD FRML MDRD: ABNORMAL ML/MIN/{1.73_M2}
GLUCOSE BLD-MCNC: 110 MG/DL (ref 75–110)
GLUCOSE BLD-MCNC: 76 MG/DL (ref 75–110)
GLUCOSE BLD-MCNC: 92 MG/DL (ref 70–99)
GLUCOSE BLD-MCNC: 98 MG/DL (ref 75–110)
HCT VFR BLD CALC: 30 % (ref 40.7–50.3)
HEMOGLOBIN: 10.1 G/DL (ref 13–17)
IMMATURE GRANULOCYTES: 2 %
LYMPHOCYTES # BLD: 10 % (ref 24–43)
MCH RBC QN AUTO: 29.9 PG (ref 25.2–33.5)
MCHC RBC AUTO-ENTMCNC: 33.7 G/DL (ref 28.4–34.8)
MCV RBC AUTO: 88.8 FL (ref 82.6–102.9)
MONOCYTES # BLD: 7 % (ref 3–12)
NRBC AUTOMATED: 0 PER 100 WBC
PDW BLD-RTO: 17.2 % (ref 11.8–14.4)
PLATELET # BLD: 68 K/UL (ref 138–453)
PMV BLD AUTO: 10.4 FL (ref 8.1–13.5)
POTASSIUM SERPL-SCNC: 4.5 MMOL/L (ref 3.7–5.3)
RBC # BLD: 3.38 M/UL (ref 4.21–5.77)
RBC # BLD: ABNORMAL 10*6/UL
SEG NEUTROPHILS: 79 % (ref 36–65)
SEGMENTED NEUTROPHILS ABSOLUTE COUNT: 7.54 K/UL (ref 1.5–8.1)
SODIUM BLD-SCNC: 134 MMOL/L (ref 135–144)
WBC # BLD: 9.6 K/UL (ref 3.5–11.3)

## 2022-09-15 PROCEDURE — 6370000000 HC RX 637 (ALT 250 FOR IP): Performed by: FAMILY MEDICINE

## 2022-09-15 PROCEDURE — 97535 SELF CARE MNGMENT TRAINING: CPT

## 2022-09-15 PROCEDURE — 97530 THERAPEUTIC ACTIVITIES: CPT

## 2022-09-15 PROCEDURE — 82947 ASSAY GLUCOSE BLOOD QUANT: CPT

## 2022-09-15 PROCEDURE — 85025 COMPLETE CBC W/AUTO DIFF WBC: CPT

## 2022-09-15 PROCEDURE — 80048 BASIC METABOLIC PNL TOTAL CA: CPT

## 2022-09-15 PROCEDURE — 2580000003 HC RX 258: Performed by: NURSE PRACTITIONER

## 2022-09-15 PROCEDURE — 99232 SBSQ HOSP IP/OBS MODERATE 35: CPT | Performed by: NURSE PRACTITIONER

## 2022-09-15 PROCEDURE — 6360000002 HC RX W HCPCS: Performed by: NURSE PRACTITIONER

## 2022-09-15 PROCEDURE — 36415 COLL VENOUS BLD VENIPUNCTURE: CPT

## 2022-09-15 RX ORDER — MIDODRINE HYDROCHLORIDE 10 MG/1
10 TABLET ORAL
Qty: 40 TABLET | Refills: 0 | Status: SHIPPED | OUTPATIENT
Start: 2022-09-15 | End: 2022-09-29

## 2022-09-15 RX ORDER — PANTOPRAZOLE SODIUM 40 MG/1
40 TABLET, DELAYED RELEASE ORAL
Qty: 30 TABLET | Refills: 3 | Status: SHIPPED | OUTPATIENT
Start: 2022-09-16

## 2022-09-15 RX ADMIN — PANTOPRAZOLE SODIUM 40 MG: 40 TABLET, DELAYED RELEASE ORAL at 05:14

## 2022-09-15 RX ADMIN — CEFTRIAXONE SODIUM 1000 MG: 1 INJECTION, POWDER, FOR SOLUTION INTRAMUSCULAR; INTRAVENOUS at 14:03

## 2022-09-15 RX ADMIN — SODIUM CHLORIDE, PRESERVATIVE FREE 10 ML: 5 INJECTION INTRAVENOUS at 09:29

## 2022-09-15 ASSESSMENT — ENCOUNTER SYMPTOMS
RESPIRATORY NEGATIVE: 1
GASTROINTESTINAL NEGATIVE: 1

## 2022-09-15 NOTE — PROGRESS NOTES
time;Oriented to person    Functional Mobility  Bed Mobility  Overall Assistance Level: Maximum Assistance; Requires x 2 Assistance  Additional Factors: Set-up; Verbal cues; Increased time to complete; Head of bed raised; With handrails  Roll Left  Assistance Level: Maximum assistance; Requires x 2 assistance  Roll Right  Assistance Level: Maximum assistance; Requires x 2 assistance  Sit to Supine  Assistance Level: Maximum assistance; Requires x 2 assistance  Supine to Sit  Assistance Level: Maximum assistance; Requires x 2 assistance  Skilled Clinical Factors: Patient requries increased time and effort for minimal motions. Patient requires MAX vc's for hand placement and progression techniques throughout bed mobility tasks. Patient requires dependent brief change and pericare. Patient a MAX x2 for supine to sit due to weakness patient educated throughout for pushing through elbow to self promote into seated however patient unable to carry over techniques and requires a MAX x2. Scooting  Assistance Level: Maximum assistance; Requires x 2 assistance  Balance  Sitting Balance: Maximum assistance  Standing Balance: Maximum assistance  Standing Balance  Comments: Patient sits EOB requires a MAX A for seated balance this date. Patient unable to self maintain seated balance this date. Patient requires MAX x2 from sit to stand with RW. Transfers  Surface: To bed;From bed  Additional Factors: Set-up; Verbal cues; Hand placement cues; Increased time to complete  Device: Walker  Sit to Stand  Assistance Level: Maximum assistance; Requires x 2 assistance  Skilled Clinical Factors: Patient performs 2 sit to stands this date patient with increased weakness and unable to complete further tasks this date. Stand to Sit  Assistance Level: Maximum assistance; Requires x 2 assistance  Skilled Clinical Factors: Pt w/ poor eccentric quad control throughout stand>sit transfers.       Environmental Mobility  Ambulation  Device: Rolling walker  Distance: 3 feet  Activity Comments: Patient unable to manage assistive device and allows device to become too far away for safety, therapist manuevers device for safety  Additional Factors: Set-up; Verbal cues; Hand placement cues; Increased time to complete  Assistance Level: Maximum assistance; Requires x 2 assistance  Gait Deviations: Unsteady gait      Neuromuscular Education  NDT Treatment: Sitting;Standing    ASSESSMENT/PROGRESS TOWARDS GOALS     AM-[facility] Inpatient Mobility Raw Score  9   AM-[facility] Inpatient T-Scale Score  30.55   Mobility Inpatient CMS 0-100% Score 81.38   Mobility Inpatient CMS G-Code Modifier  CM       Assessment  Assessment: Patient with global deconditioning and decreased safety awareness. Patient a MAX x2 with RW for transfer from bed to chair this date. Patient very unsteady this date. Patient would benefit from continued skilled PT treatment to maximize return to PLOF  Activity Tolerance: Patient limited by endurance; Patient limited by fatigue  Discharge Recommendations: Patient would benefit from continued therapy after discharge    Goals  Patient Goals   Patient goals : \"To take a nap\"  Short Term Goals  Time Frame for Short term goals: 12 visits  Short term goal 1: Pt to demonstrate bed mobility Karley  Short term goal 2: Pt to perform STS transfers w/ RW Chandana  Short term goal 4: Pt to actively participate in at least 30 minutes of physical therapy for ther act, ther ex, balance, gait, and endurance training  Short term goal 5: Pt to be indep w/ pressure relief techniques in order to prevent pressure injuries    PLAN OF CARE/SAFETY  Plan  Plan: 5-7 times per week  Current Treatment Recommendations: Strengthening; Functional mobility training;Balance training;Transfer training;Neuromuscular re-education;Gait training; Endurance training;Pain management;Home exercise program;Safety education & training;Patient/Caregiver education & training;Equipment evaluation, education, & procurement; Therapeutic activities; Positioning;ADL/Self-care training  Safety Devices  Type of Devices: Call light within reach;Gait belt;Nurse notified; Left in chair; All fall risk precautions in place; Chair alarm in place  Restraints  Restraints Initially in Place: No    EDUCATION  Education  Education Given To: Patient  Education Provided: Role of Therapy;Plan of Care;Safety; Energy Conservation; Fall Prevention Strategies;Transfer Training;Mobility Training;Equipment  Education Provided Comments: Writer reinforced education: importance of continued mobility throughout admission, general safety awareness, fall risk prevention, safe transfers w/ RW,        Therapy Time   Individual Concurrent Group Co-treatment   Time In       1008   Time Out       1031   Minutes       23     Co-treatment with OT warranted secondary to decreased safety and independence requiring 2 skilled therapy professionals to address individual discipline's goals. PT addressing pre gait trunk strengthening, weight shifting prior to transfers, transfer training, and postural control in sitting.    Elio Lyons, NYLA, 09/15/22 at 11:13 AM

## 2022-09-15 NOTE — PROGRESS NOTES
Occupational Therapy  Facility/Department: [facility] ICU  Rehabilitation Occupational Therapy Daily Treatment Note    Date: 9/15/22  Patient Name: Breanne Cao       Room: 9757/2415-87  MRN: [id number]  Account: [de-identified]   : 1935  (80 y.o.) Gender: male        Pt currently functioning below baseline. Recommend daily inpatient skilled therapy at time of discharge to maximize long term outcomes and prevent re-admission. Please refer to AM-PAC score for current level of function. Past Medical History:  has a past medical history of Chronic back pain, Cirrhosis of liver without ascites ([facility] [address] and Diabetes mellitus ([facility] [address] Past Surgical History:   has a past surgical history that includes Bariatric Surgery and IR INSERT PICC VAD W SQ PORT >5 YEARS (2022). Restrictions  Restrictions/Precautions: General Precautions; Fall Risk;Up as Tolerated  Other position/activity restrictions: L UE IV, telemetry, BP cuff, cont SPO2 monitor  Required Braces or Orthoses?: No    Subjective  Subjective: Pt in bed upon arrival with soiled brief and was unaware. Pt resistive to getting up and out of bed but with max encouragement was agreeable. RN present at end of session. Restrictions/Precautions: General Precautions; Fall Risk;Up as Tolerated             Objective     Cognition  Overall Cognitive Status: Exceptions  Arousal/Alertness: Appropriate responses to stimuli;Delayed responses to stimuli  Following Commands: Follows one step commands with increased time; Follows one step commands with repetition; Inconsistently follows commands; Does not follow commands  Attention Span: Attends with cues to redirect; Difficulty attending to directions; Difficulty dividing attention  Memory: Decreased short term memory;Decreased recall of recent events;Decreased long term memory  Safety Judgement: Decreased awareness of need for assistance;Decreased awareness of need for safety  Problem Solving: Decreased awareness of errors;Assistance required to identify errors made;Assistance required to generate solutions;Assistance required to implement solutions;Assistance required to correct errors made  Initiation: Requires cues for all  Cognition Comment: Pt easily agitated and resistive to participation in functional tasks. Orientation  Overall Orientation Status: Impaired   Perception  Overall Perceptual Status: Impaired  Initiation: Hand over hand to initiate tasks  Motor Planning: Hand over hand to sequence tasks     ADL  Lower Extremity Dressing  Assistance Level: Dependent; Requires x 2 assistance  Skilled Clinical Factors: in supine  Putting On/Taking Off Footwear  Assistance Level: Dependent  Skilled Clinical Factors: in supine  Toileting  Assistance Level: Dependent; Requires x 2 assistance  Skilled Clinical Factors: in supine for hygiene after BM and brief change          Bed Mobility  Overall Assistance Level: Maximum Assistance; Requires x 2 Assistance  Additional Factors: Verbal cues; Increased time to complete; Head of bed flat  Roll Left  Assistance Level: Maximum assistance; Requires x 2 assistance  Roll Right  Assistance Level: Maximum assistance; Requires x 2 assistance  Supine to Sit  Assistance Level: Minimal assistance; Requires x 2 assistance  Skilled Clinical Factors: with HOB flat and max verbal/tactile cues for sequencing movements, use of bed rail and overall safety. Transfers  Surface: From bed;From chair with arms  Additional Factors: Verbal cues; Hand placement cues  Device: Walker  Sit to Stand  Assistance Level: Maximum assistance; Requires x 2 assistance  Stand to Sit  Assistance Level: Maximum assistance; Requires x 2 assistance  Skilled Clinical Factors: Pt req'd MAX verbal/tactile cues for hand placement, nose over toes, upright posture, walker safety/mgmt, backing up, squaring self up and reaching back prior to sitting down with ZERO follow thru. Pt very unsafe and is a HIGH FALL RISK.    Neuromuscular Education  Neuromuscular education: Yes  NDT Treatment: Sitting;Standing     Assessment  Assessment  Assessment: Pt presents with deficits in bed mobility, transfers, functional mobility and the ability to complete simple self care tasks. Pt req's 2 staff assist for all tasks and use of sarah stedy for transfers. Pt is limited by global weakness, decreased activity tolerance and cognitive deficits. Pt would benefit from continued skilled OT services to increase I and safety during funcitonal tasks to reduce caregiver burden as able. Activity Tolerance: Patient limited by endurance; Patient limited by fatigue  Discharge Recommendations: Patient would benefit from continued therapy after discharge  Safety Devices  Safety Devices in place: Yes  Type of devices: All fall risk precautions in place; Left in chair;Nurse notified;Call light within reach;Gait belt;Patient at risk for falls    Patient Education  Education  Education Given To: Patient  Education Provided: Mobility Training;Transfer Training; Safety; Fall Prevention Strategies  Education Method: Verbal;Teach Back  Barriers to Learning: Cognition  Education Outcome: Continued education needed    Plan  Plan  Times per Week: 4-5x/week 1x/day as mikey  Current Treatment Recommendations: Strengthening;ROM;Balance training;Functional mobility training;Neuromuscular re-education;Cognitive reorientation;Pain management; Endurance training; Safety education & training;Positioning;Patient/Caregiver education & training;Equipment evaluation, education, & procurement;Home management training;Self-Care / ADL;Cognitive/Perceptual training;Coordination training    Goals  Patient Goals   Patient goals : Pt states he just wants to take a nap! Short Term Goals  Time Frame for Short term goals: by discharge, pt to demo  Short Term Goal 1: bed mob tasks with use of rail as needed to max assist of 1.   Short Term Goal 2: UB ADL to min assist/set up and LB ADL to max assist of 1 supine in bed with use of rail/AE as able. Short Term Goal 3: ADL transfers with use of lift/AD to max assist of 1(add functional mob goal to POC as appropriate). Short Term Goal 4: increase in R shld AROM by 10-15 degrees/increase BUE strength by a 1/2 grade to assist with ADL tasks. Short Term Goal 5: sitting balance to SBA and mikey > 20 mins as able to reduce falls/increase overall act mikey. Long Term Goals  Long Term Goal 1: Pt to stand with mod assist of 1 and AD mikey > 4 mins as able to reduce falls with ADL's. Long Term Goal 2: Caregivers to be I with pressure relief, BUE HEP, EC/WS and fall prevention tech, and DME/AD and AE recommendations with use of handouts. Long Term Goal 3: Pt to complete toileting tasks with use of BSC/AD and grab bar as needed to mod assist of 1. AM-PAC Score        AM-[facility] Inpatient Daily Activity Raw Score: 12 (09/15/22 1057)  AM-PAC Inpatient ADL T-Scale Score : 30.6 (09/15/22 1057)  ADL Inpatient CMS 0-100% Score: 66.57 (09/15/22 1057)  ADL Inpatient CMS G-Code Modifier : CL (09/15/22 1057)      Therapy Time   Individual Concurrent Group Co-treatment   Time In       1008   Time Out       1031   Minutes       21    Co-treatment with PT warranted secondary to decreased safety and independence requiring 2 skilled therapy professionals to address individual discipline's goals. OT addressing preparation for ADL transfer, sitting balance for increased ADL performance, sitting/activity tolerance, functional reaching, environmental safety/scanning, fall prevention, functional mobility for ADL transfers, ability to sequence and follow directions, bed mobility tech, and functional UE strength.          TEJ Forrester

## 2022-09-15 NOTE — PROGRESS NOTES
Renal Progress Note    Patient :  Deidra Bosworth; 80 y.o. MRN# [id number]  Location:    Attending:  Burnette Frankel, MD  Admit Date:  2022   Hospital Day: 7    Subjective:     Patient was seen and examined. Discharge planning noted. Patient is going to be discharged at [facility] rehab facility today. Patient remains hemodynamically stable. Blood culture 2022: Positive for E. coli. Urine culture 2022 positive for E. coli as well. Outpatient Medications:     No medications prior to admission. Current Medications:     Scheduled Meds:    pantoprazole  40 mg Oral QAM AC    cefTRIAXone (ROCEPHIN) IV  1,000 mg IntraVENous Q24H    midodrine  10 mg Oral TID WC    sodium chloride flush  5-40 mL IntraVENous 2 times per day     Continuous Infusions:    sodium chloride 50 mL/hr at 09/15/22 0503    norepinephrine Stopped (09/10/22 1312)    dextrose      sodium chloride Stopped (22 1432)     PRN Meds:  glucose, dextrose bolus **OR** dextrose bolus, glucagon (rDNA), dextrose, sodium chloride flush, sodium chloride, ondansetron **OR** ondansetron, acetaminophen **OR** acetaminophen    Input/Output:       I/O last 3 completed shifts: In: 7.2 [I.V.:.2]  Out: 250 [Urine:250]. No data found. Vital Signs:   Temperature:  Temp: 96.9 °F (36.1 °C)  TMax:   Temp (24hrs), Av.9 °F (36.6 °C), Min:96.9 °F (36.1 °C), Max:98.7 °F (37.1 °C)    Respirations:  Resp: (!) 9  Pulse:   Heart Rate: 91  BP:    BP: 137/66  BP Range: Systolic (56UEQ), ETT:638 , Min:118 , IIZ:291       Diastolic (09IGA), FSW:10, Min:61, Max:92      Physical Examination:     General:  And was awake and alert   HEENT: Atraumatic and normocephalic. Eyes:   Pulls are reactive to light  Neck:   Supple  Chest:   Bilateral air entry and clear  Cardiac:  S1 S2 RR, no murmurs, gallops or rubs. Abdomen: Soft and nontender bowel sounds are positive. :   No suprapubic or flank tenderness.   Neuro:   X3  SKIN:  No rashes, good skin turgor. Extremities:  No edema. Labs:       Recent Labs     09/13/22  0812 09/15/22  0642   WBC 13.8* 9.6   RBC 3.55* 3.38*   HGB 10.7* 10.1*   HCT 30.6* 30.0*   MCV 86.2 88.8   MCH 30.1 29.9   MCHC 35.0* 33.7   RDW 17.0* 17.2*   PLT 60* 68*   MPV 8.8 10.4      BMP:   Recent Labs     09/13/22  0344 09/14/22  0354 09/15/22  0642   * 132* 134*   K 5.3 4.7 4.5   CL 99 99 102   CO2 22 21 23   BUN 50* 43* 37*   CREATININE 2.74* 2.72* 2.25*   GLUCOSE 85 95 92   CALCIUM 8.1* 8.0* 7.8*   SPEP:  Lab Results   Component Value Date/Time    PROT 5.5 09/12/2022 05:15 AM     Hep BsAg:         Lab Results   Component Value Date/Time    HEPBSAG NONREACTIVE 09/13/2022 08:12 AM     Hep C AB:          Lab Results   Component Value Date/Time    HEPCAB NONREACTIVE 09/13/2022 08:12 AM       Urinalysis/Chemistries:      Lab Results   Component Value Date/Time    NITRU NEGATIVE 09/08/2022 03:33 PM    COLORU Dark Yellow 09/08/2022 03:33 PM    PHUR 5.5 09/08/2022 03:33 PM    WBCUA 5 TO 10 09/08/2022 03:33 PM    RBCUA 20 TO 50 09/08/2022 03:33 PM    MUCUS NOT REPORTED 07/31/2019 07:58 AM    TRICHOMONAS NOT REPORTED 07/31/2019 07:58 AM    YEAST NOT REPORTED 07/31/2019 07:58 AM    BACTERIA MANY 09/08/2022 03:33 PM    SPECGRAV 1.015 09/08/2022 03:33 PM    LEUKOCYTESUR SMALL 09/08/2022 03:33 PM    UROBILINOGEN Normal 09/08/2022 03:33 PM    BILIRUBINUR  09/08/2022 03:33 PM     Presumptive positive. Unable to confirm due to unavailability of reagent. GLUCOSEU NEGATIVE 09/08/2022 03:33 PM    KETUA TRACE 09/08/2022 03:33 PM    AMORPHOUS NOT REPORTED 07/31/2019 07:58 AM     Urine Sodium:     Lab Results   Component Value Date/Time    SIMONE <20 09/08/2022 06:40 PM     Urine Osmolarity:   Lab Results   Component Value Date/Time    OSMOU 207 09/08/2022 06:40 PM      Urine Creatinine:     Lab Results   Component Value Date/Time    LABCREA 42.2 09/10/2022 01:30 PM     Radiology:     Reviewed.      Assessment:     Acute kidney injury: Improving and creatinine now at his baseline. E. coli sepsis. Bacteremia with Blood culture 9/8/2022: Positive for E. coli. Giving antibiotic   UTI with Urine culture 9/8/2022 positive for E. Coli. Colitis and proctitis. Diastolic dysfunction. Mild aortic stenosis. Right and left hepatic lobe nodular foci presumed early abscess on antibiotics per ID. MRCP positive for cirrhosis and liver abscess as per GI. Cirrhosis and splenomegaly. Plan:   DC IV fluids  Okay to DC from renal standpoint  Follow-up with Dr. Jaclyn Schuster in 2 to 4 weeks  No changes in medications  Nutrition   Please ensure that patient is on a renal diet/TF. Avoid nephrotoxic drugs/contrast exposure. Dayna Javier MD  [facility]     This note is created with the assistance of a speech-recognition program. While intending to generate a document that actually reflects the content of the visit, no guarantees can be provided that every mistake has been identified and corrected by editing.

## 2022-09-15 NOTE — PROGRESS NOTES
End Of Shift Note  [facility]  Summary of shift: Patient rested comfortably majority of the shift. RN changed patients gown and bottom sheet since they were slightly soiled of blood from PICC placement. Patient accidentally removed CVC that was in the R IJ, no complications with removal.   Vitals:    Vitals:    09/15/22 0000 09/15/22 0100 09/15/22 0200 09/15/22 0300   BP: (!) 142/75 122/70  118/71   Pulse: 89 92 95 (!) 112   Resp: 16 18 17 26   Temp:       TempSrc: Temporal      SpO2: 97% 97% 94% (!) 79%   Weight:       Height:            I&O:   Intake/Output Summary (Last 24 hours) at 9/15/2022 0522  Last data filed at 9/15/2022 0503  Gross per 24 hour   Intake 1957.15 ml   Output 150 ml   Net 1807.15 ml       Resp Status:  On room air    Ventilator Settings:     / / /     Critical Care IV infusions:   sodium chloride 50 mL/hr at 09/15/22 0503    norepinephrine Stopped (09/10/22 1312)    dextrose      sodium chloride Stopped (09/11/22 1432)        LDA:   PICC Double Lumen 19/50/51 Right Basilic (Active)   Number of days: 0

## 2022-09-15 NOTE — PLAN OF CARE
Problem: Discharge Planning  Goal: Discharge to home or other facility with appropriate resources  9/15/2022 1540 by Igor Wharton RN  Outcome: Completed  9/15/2022 1539 by Igor Wharton RN  Outcome: Adequate for Discharge  Flowsheets  Taken 9/15/2022 1300  Discharge to home or other facility with appropriate resources:   Identify barriers to discharge with patient and caregiver   Arrange for needed discharge resources and transportation as appropriate   Identify discharge learning needs (meds, wound care, etc)   Refer to discharge planning if patient needs post-hospital services based on physician order or complex needs related to functional status, cognitive ability or social support system  Taken 9/15/2022 0915  Discharge to home or other facility with appropriate resources:   Identify barriers to discharge with patient and caregiver   Arrange for needed discharge resources and transportation as appropriate   Identify discharge learning needs (meds, wound care, etc)   Refer to discharge planning if patient needs post-hospital services based on physician order or complex needs related to functional status, cognitive ability or social support system  9/15/2022 0501 by Fei Rapp RN  Outcome: Progressing  Flowsheets  Taken 9/15/2022 0000 by Fei Rapp RN  Discharge to home or other facility with appropriate resources:   Identify barriers to discharge with patient and caregiver   Arrange for needed discharge resources and transportation as appropriate  Taken 9/14/2022 1600 by Trey Spurling, RN  Discharge to home or other facility with appropriate resources: Identify barriers to discharge with patient and caregiver     Problem: Pain  Goal: Verbalizes/displays adequate comfort level or baseline comfort level  9/15/2022 1540 by Igor Wharton RN  Outcome: Completed  9/15/2022 1539 by Igor Wharton RN  Outcome: Adequate for Discharge  9/15/2022 0501 by Fei Rapp RN  Outcome: Progressing  Flowsheets (Taken 9/14/2022 1800 by Cathy Easton RN)  Verbalizes/displays adequate comfort level or baseline comfort level: Encourage patient to monitor pain and request assistance     Problem: Safety - Adult  Goal: Free from fall injury  9/15/2022 1540 by Sebastian Godinez RN  Outcome: Completed  9/15/2022 1539 by Sebastian Godinez RN  Outcome: Adequate for Discharge  Flowsheets (Taken 9/15/2022 0830)  Free From Fall Injury: Instruct family/caregiver on patient safety  9/15/2022 0501 by Analilia Beach RN  Outcome: Progressing     Problem: Chronic Conditions and Co-morbidities  Goal: Patient's chronic conditions and co-morbidity symptoms are monitored and maintained or improved  9/15/2022 1540 by Sebastian Godinez RN  Outcome: Completed  9/15/2022 1539 by Sebastian Godinez RN  Outcome: Adequate for Discharge  Flowsheets  Taken 9/15/2022 1300  Care Plan - Patient's Chronic Conditions and Co-Morbidity Symptoms are Monitored and Maintained or Improved:   Monitor and assess patient's chronic conditions and comorbid symptoms for stability, deterioration, or improvement   Collaborate with multidisciplinary team to address chronic and comorbid conditions and prevent exacerbation or deterioration   Update acute care plan with appropriate goals if chronic or comorbid symptoms are exacerbated and prevent overall improvement and discharge  Taken 9/15/2022 0915  Care Plan - Patient's Chronic Conditions and Co-Morbidity Symptoms are Monitored and Maintained or Improved:   Monitor and assess patient's chronic conditions and comorbid symptoms for stability, deterioration, or improvement   Collaborate with multidisciplinary team to address chronic and comorbid conditions and prevent exacerbation or deterioration   Update acute care plan with appropriate goals if chronic or comorbid symptoms are exacerbated and prevent overall improvement and discharge  9/15/2022 0501 by Analilia Beach RN  Outcome: JORDAN Bliss  Outcome: Completed  9/15/2022 1539 by Marilyn Capone RN  Outcome: Adequate for Discharge  Flowsheets  Taken 9/15/2022 1300  Achieves optimal ventilation and oxygenation:   Assess for changes in respiratory status   Assess for changes in mentation and behavior   Position to facilitate oxygenation and minimize respiratory effort   Oxygen supplementation based on oxygen saturation or arterial blood gases   Encourage broncho-pulmonary hygiene including cough, deep breathe, incentive spirometry   Assess the need for suctioning and aspirate as needed   Assess and instruct to report shortness of breath or any respiratory difficulty   Respiratory therapy support as indicated  Taken 9/15/2022 0915  Achieves optimal ventilation and oxygenation:   Assess for changes in respiratory status   Assess for changes in mentation and behavior   Position to facilitate oxygenation and minimize respiratory effort   Oxygen supplementation based on oxygen saturation or arterial blood gases   Encourage broncho-pulmonary hygiene including cough, deep breathe, incentive spirometry   Assess the need for suctioning and aspirate as needed   Assess and instruct to report shortness of breath or any respiratory difficulty   Respiratory therapy support as indicated  9/15/2022 0501 by Karmen Sousa RN  Outcome: Progressing  Flowsheets  Taken 9/15/2022 0000 by Karmen Sousa RN  Achieves optimal ventilation and oxygenation:   Assess for changes in respiratory status   Assess for changes in mentation and behavior   Position to facilitate oxygenation and minimize respiratory effort  Taken 9/14/2022 1600 by Baltazar Medina RN  Achieves optimal ventilation and oxygenation: Assess for changes in respiratory status     Problem: Cardiovascular - Adult  Goal: Absence of cardiac dysrhythmias or at baseline  9/15/2022 1540 by Marilyn Capone RN  Outcome: Completed  9/15/2022 1539 by Marilyn Capone RN  Outcome: Adequate for Discharge  Flowsheets  Taken 9/15/2022 1300  Absence of cardiac dysrhythmias or at baseline:   Monitor cardiac rate and rhythm   Assess for signs of decreased cardiac output   Administer antiarrhythmia medication and electrolyte replacement as ordered  Taken 9/15/2022 0915  Absence of cardiac dysrhythmias or at baseline:   Monitor cardiac rate and rhythm   Assess for signs of decreased cardiac output   Administer antiarrhythmia medication and electrolyte replacement as ordered  9/15/2022 0501 by Onelia Galeana RN  Outcome: Progressing  Flowsheets (Taken 9/15/2022 0000)  Absence of cardiac dysrhythmias or at baseline:   Monitor cardiac rate and rhythm   Assess for signs of decreased cardiac output     Problem: Skin/Tissue Integrity - Adult  Goal: Skin integrity remains intact  9/15/2022 1540 by Yuko Machado RN  Outcome: Completed  9/15/2022 1539 by Yuko Machado RN  Outcome: Adequate for Discharge  Flowsheets  Taken 9/15/2022 1300  Skin Integrity Remains Intact:   Monitor for areas of redness and/or skin breakdown   Assess vascular access sites hourly  Taken 9/15/2022 0915  Skin Integrity Remains Intact:   Monitor for areas of redness and/or skin breakdown   Assess vascular access sites hourly  Taken 9/15/2022 0830  Skin Integrity Remains Intact:   Monitor for areas of redness and/or skin breakdown   Assess vascular access sites hourly  9/15/2022 0501 by Onelia Galeana RN  Outcome: Progressing  Flowsheets  Taken 9/15/2022 0000 by Onelia Galeana RN  Skin Integrity Remains Intact: Monitor for areas of redness and/or skin breakdown  Taken 9/14/2022 1600 by Apollo Cruz RN  Skin Integrity Remains Intact: Monitor for areas of redness and/or skin breakdown  Goal: Oral mucous membranes remain intact  9/15/2022 1540 by Yuko Machado RN  Outcome: Completed  9/15/2022 1539 by Yuko Machado RN  Outcome: Adequate for Discharge  Flowsheets  Taken 9/15/2022 1300  Oral Mucous Membranes Remain Intact:   Assess oral mucosa and hygiene practices   Implement preventative oral hygiene regimen   Implement oral medicated treatments as ordered  Taken 9/15/2022 0830  Oral Mucous Membranes Remain Intact:   Assess oral mucosa and hygiene practices   Implement preventative oral hygiene regimen   Implement oral medicated treatments as ordered  9/15/2022 0501 by Karmen Sousa RN  Outcome: Progressing  Flowsheets  Taken 9/15/2022 0000 by Karmen Sousa RN  Oral Mucous Membranes Remain Intact: Assess oral mucosa and hygiene practices  Taken 9/14/2022 1600 by Baltazar Medina RN  Oral Mucous Membranes Remain Intact: Assess oral mucosa and hygiene practices     Problem: Musculoskeletal - Adult  Goal: Return mobility to safest level of function  9/15/2022 1540 by Marilyn Capone RN  Outcome: Completed  9/15/2022 1539 by Marilyn Capone RN  Outcome: Adequate for Discharge  Flowsheets  Taken 9/15/2022 1300  Return Mobility to Safest Level of Function:   Assess patient stability and activity tolerance for standing, transferring and ambulating with or without assistive devices   Assist with transfers and ambulation using safe patient handling equipment as needed   Ensure adequate protection for wounds/incisions during mobilization   Obtain physical therapy/occupational therapy consults as needed   Instruct patient/family in ordered activity level  Taken 9/15/2022 0915  Return Mobility to Safest Level of Function:   Assess patient stability and activity tolerance for standing, transferring and ambulating with or without assistive devices   Assist with transfers and ambulation using safe patient handling equipment as needed   Ensure adequate protection for wounds/incisions during mobilization   Obtain physical therapy/occupational therapy consults as needed   Instruct patient/family in ordered activity level  9/15/2022 0501 by Karmen Sousa RN  Outcome: Progressing  Flowsheets  Taken 9/15/2022 0000 by Karmen Sousa RN  Return Mobility to Safest Level of Function: Assist with transfers and ambulation using safe patient handling equipment as needed  Taken 9/14/2022 1600 by Karishma Yates RN  Return Mobility to Safest Level of Function: Assess patient stability and activity tolerance for standing, transferring and ambulating with or without assistive devices  Goal: Maintain proper alignment of affected body part  9/15/2022 1540 by Jodie Rubin RN  Outcome: Completed  9/15/2022 1539 by Jodie Rubin RN  Outcome: Adequate for Discharge  Flowsheets  Taken 9/15/2022 1300  Maintain proper alignment of affected body part:   Support and protect limb and body alignment per provider's orders   Instruct and reinforce with patient and family use of appropriate assistive device and precautions (e.g. spinal or hip dislocation precautions)  Taken 9/15/2022 0915  Maintain proper alignment of affected body part:   Support and protect limb and body alignment per provider's orders   Instruct and reinforce with patient and family use of appropriate assistive device and precautions (e.g. spinal or hip dislocation precautions)  9/15/2022 0501 by Aurelio Velasquez RN  Outcome: Progressing  Flowsheets  Taken 9/15/2022 0000 by Aurelio Velasquez RN  Maintain proper alignment of affected body part: Support and protect limb and body alignment per provider's orders  Taken 9/14/2022 1600 by Karishma Yates RN  Maintain proper alignment of affected body part: Support and protect limb and body alignment per provider's orders  Goal: Return ADL status to a safe level of function  9/15/2022 1540 by Jodie Rubin RN  Outcome: Completed  9/15/2022 1539 by Jodie Rubin RN  Outcome: Adequate for Discharge  Flowsheets  Taken 9/15/2022 1300  Return ADL Status to a Safe Level of Function:   Administer medication as ordered   Assess activities of daily living deficits and provide assistive devices as needed   Obtain physical therapy/occupational therapy consults as needed   Assist and instruct patient to increase activity and self care as tolerated  Taken 9/15/2022 0915  Return ADL Status to a Safe Level of Function:   Administer medication as ordered   Assess activities of daily living deficits and provide assistive devices as needed   Obtain physical therapy/occupational therapy consults as needed   Assist and instruct patient to increase activity and self care as tolerated  9/15/2022 0501 by Nya Carter RN  Outcome: Progressing  Flowsheets  Taken 9/15/2022 0000 by Nya Carter RN  Return ADL Status to a Safe Level of Function:   Administer medication as ordered   Assess activities of daily living deficits and provide assistive devices as needed  Taken 9/14/2022 1600 by Aicha Martin RN  Return ADL Status to a Safe Level of Function: Administer medication as ordered     Problem: Gastrointestinal - Adult  Goal: Minimal or absence of nausea and vomiting  9/15/2022 1540 by Musa Rodriguez RN  Outcome: Completed  9/15/2022 1539 by Musa Rodriguez RN  Outcome: Adequate for Discharge  Flowsheets  Taken 9/15/2022 1300  Minimal or absence of nausea and vomiting:   Administer IV fluids as ordered to ensure adequate hydration   Maintain NPO status until nausea and vomiting are resolved   Administer ordered antiemetic medications as needed   Provide nonpharmacologic comfort measures as appropriate   Advance diet as tolerated, if ordered   Nutrition consult to assist patient with adequate nutrition and appropriate food choices  Taken 9/15/2022 0915  Minimal or absence of nausea and vomiting:   Administer IV fluids as ordered to ensure adequate hydration   Maintain NPO status until nausea and vomiting are resolved   Administer ordered antiemetic medications as needed   Provide nonpharmacologic comfort measures as appropriate   Advance diet as tolerated, if ordered   Nutrition consult to assist patient with adequate nutrition and appropriate food choices  9/15/2022 0501 by Nya Carter RN  Outcome: Progressing  Flowsheets  Taken 9/15/2022 0000 by Kamini George RN  Minimal or absence of nausea and vomiting: Administer IV fluids as ordered to ensure adequate hydration  Taken 9/14/2022 1600 by Vicente Hammer RN  Minimal or absence of nausea and vomiting: Administer IV fluids as ordered to ensure adequate hydration  Goal: Maintains or returns to baseline bowel function  9/15/2022 1540 by Delmas Runner, RN  Outcome: Completed  9/15/2022 1539 by Delmas Runner, RN  Outcome: Adequate for Discharge  Flowsheets  Taken 9/15/2022 1300  Maintains or returns to baseline bowel function:   Assess bowel function   Encourage oral fluids to ensure adequate hydration   Administer IV fluids as ordered to ensure adequate hydration   Administer ordered medications as needed   Encourage mobilization and activity   Nutrition consult to assist patient with appropriate food choices  Taken 9/15/2022 0915  Maintains or returns to baseline bowel function:   Assess bowel function   Encourage oral fluids to ensure adequate hydration   Encourage mobilization and activity   Nutrition consult to assist patient with appropriate food choices   Administer ordered medications as needed   Administer IV fluids as ordered to ensure adequate hydration  9/15/2022 0501 by Kamini George RN  Outcome: Progressing  Flowsheets  Taken 9/15/2022 0000 by Kamini George RN  Maintains or returns to baseline bowel function:   Assess bowel function   Encourage oral fluids to ensure adequate hydration  Taken 9/14/2022 1600 by Vicente Hammer RN  Maintains or returns to baseline bowel function: Assess bowel function  Goal: Maintains adequate nutritional intake  9/15/2022 1540 by Delmas Runner, RN  Outcome: Completed  9/15/2022 1539 by Delmas Runner, RN  Outcome: Adequate for Discharge  Flowsheets  Taken 9/15/2022 1300  Maintains adequate nutritional intake:   Monitor percentage of each meal consumed   Identify factors contributing to decreased intake, treat as appropriate   Assist with meals as needed   Monitor intake and output, weight and lab values   Obtain nutritional consult as needed  Taken 9/15/2022 0915  Maintains adequate nutritional intake:   Monitor percentage of each meal consumed   Identify factors contributing to decreased intake, treat as appropriate   Assist with meals as needed   Monitor intake and output, weight and lab values   Obtain nutritional consult as needed  9/15/2022 0501 by Jeannett Felty, RN  Outcome: Progressing  Flowsheets  Taken 9/15/2022 0000 by Jeannett Felty, RN  Maintains adequate nutritional intake:   Monitor percentage of each meal consumed   Identify factors contributing to decreased intake, treat as appropriate  Taken 9/14/2022 1600 by Mago Nino RN  Maintains adequate nutritional intake: Monitor percentage of each meal consumed  Goal: Establish and maintain optimal ostomy function  9/15/2022 1540 by Nataly Zamora RN  Outcome: Completed  9/15/2022 1539 by Nataly Zamora RN  Outcome: Adequate for Discharge  Flowsheets  Taken 9/15/2022 1300  Establish and maintain optimal ostomy function:   Monitor output from ostomies   Administer IV fluids and TPN as ordered   Nutrition consult   Infuse IV Fluids/TPN as ordered  Taken 9/15/2022 0915  Establish and maintain optimal ostomy function:   Introduce and advance enteral feedings as ordered   Administer IV fluids and TPN as ordered   Monitor output from ostomies   Nutrition consult   Infuse IV Fluids/TPN as ordered  9/15/2022 0501 by Jeannett Felty, RN  Outcome: Progressing  Flowsheets  Taken 9/15/2022 0000 by Jeannett Felty, RN  Establish and maintain optimal ostomy function: Administer IV fluids and TPN as ordered  Taken 9/14/2022 1600 by Mago Nino RN  Establish and maintain optimal ostomy function: Administer IV fluids and TPN as ordered     Problem: Genitourinary - Adult  Goal: Absence of urinary retention  9/15/2022 1540 by Nataly Zamora RN  Outcome: Progressing  Flowsheets  Taken 9/15/2022 0000 by Siobhan Triplett RN  Urinary catheter remains patent: Assess patency of urinary catheter  Taken 9/14/2022 1600 by Jaylyn Dawson RN  Urinary catheter remains patent: Assess patency of urinary catheter     Problem: Infection - Adult  Goal: Absence of infection at discharge  9/15/2022 1540 by Mason Garsia RN  Outcome: Completed  9/15/2022 1539 by Mason Garsia RN  Outcome: Adequate for Discharge  Flowsheets  Taken 9/15/2022 1300  Absence of infection at discharge:   Assess and monitor for signs and symptoms of infection   Monitor lab/diagnostic results   Monitor all insertion sites i.e., indwelling lines, tubes and drains   Administer medications as ordered   Identify and instruct in appropriate isolation precautions for identified infection/condition   Instruct and encourage patient and family to use good hand hygiene technique  Taken 9/15/2022 0915  Absence of infection at discharge:   Assess and monitor for signs and symptoms of infection   Monitor lab/diagnostic results   Monitor all insertion sites i.e., indwelling lines, tubes and drains   Administer medications as ordered   Instruct and encourage patient and family to use good hand hygiene technique   Identify and instruct in appropriate isolation precautions for identified infection/condition  9/15/2022 0501 by Siobhan Triplett RN  Outcome: Progressing  Flowsheets  Taken 9/15/2022 0000 by Siobhan Triplett RN  Absence of infection at discharge:   Assess and monitor for signs and symptoms of infection   Monitor lab/diagnostic results  Taken 9/14/2022 1600 by Jaylyn Dawson RN  Absence of infection at discharge: Assess and monitor for signs and symptoms of infection  Goal: Absence of infection during hospitalization  9/15/2022 1540 by Mason Garsia RN  Outcome: Completed  9/15/2022 1539 by Mason Garsia RN  Outcome: Adequate for Discharge  Flowsheets  Taken 9/15/2022 1300  Absence of infection during hospitalization:   Assess and monitor for signs and symptoms of infection   Monitor lab/diagnostic results   Monitor all insertion sites i.e., indwelling lines, tubes and drains   Administer medications as ordered   Instruct and encourage patient and family to use good hand hygiene technique   Identify and instruct in appropriate isolation precautions for identified infection/condition  Taken 9/15/2022 0915  Absence of infection during hospitalization:   Assess and monitor for signs and symptoms of infection   Monitor lab/diagnostic results   Monitor all insertion sites i.e., indwelling lines, tubes and drains   Instruct and encourage patient and family to use good hand hygiene technique   Administer medications as ordered   Identify and instruct in appropriate isolation precautions for identified infection/condition  9/15/2022 0501 by Marti Pedro RN  Outcome: Progressing  Flowsheets  Taken 9/15/2022 0000 by Marti Pedro RN  Absence of infection during hospitalization:   Assess and monitor for signs and symptoms of infection   Monitor lab/diagnostic results  Taken 9/14/2022 1600 by Celine Gutierrez RN  Absence of infection during hospitalization: Assess and monitor for signs and symptoms of infection  Goal: Absence of fever/infection during anticipated neutropenic period  9/15/2022 1540 by Gregary Runner, RN  Outcome: Completed  9/15/2022 1539 by Gregary Runner, RN  Outcome: Adequate for Discharge  Flowsheets  Taken 9/15/2022 1300  Absence of fever/infection during anticipated neutropenic period:   Monitor white blood cell count   Administer growth factors as ordered   Implement neutropenic guidelines  Taken 9/15/2022 0915  Absence of fever/infection during anticipated neutropenic period:   Monitor white blood cell count   Administer growth factors as ordered   Implement neutropenic guidelines  9/15/2022 0501 by Marti Pedro RN  Outcome: Progressing  Flowsheets  Taken 9/15/2022 0000 by Marti Pedro RN  Absence of fever/infection during anticipated neutropenic period: Monitor white blood cell count  Taken 9/14/2022 1600 by Artie Ortiz RN  Absence of fever/infection during anticipated neutropenic period: Monitor white blood cell count     Problem: Metabolic/Fluid and Electrolytes - Adult  Goal: Electrolytes maintained within normal limits  9/15/2022 1540 by Adriana Casey RN  Outcome: Completed  9/15/2022 1539 by Adriana Casey RN  Outcome: Adequate for Discharge  Flowsheets  Taken 9/15/2022 1300  Electrolytes maintained within normal limits:   Monitor labs and assess patient for signs and symptoms of electrolyte imbalances   Administer electrolyte replacement as ordered   Monitor response to electrolyte replacements, including repeat lab results as appropriate   Instruct patient on fluid and nutrition restrictions as appropriate  Taken 9/15/2022 0915  Electrolytes maintained within normal limits:   Monitor labs and assess patient for signs and symptoms of electrolyte imbalances   Administer electrolyte replacement as ordered   Monitor response to electrolyte replacements, including repeat lab results as appropriate   Instruct patient on fluid and nutrition restrictions as appropriate   Fluid restriction as ordered  9/15/2022 0501 by Parish Salguero RN  Outcome: Progressing  Flowsheets  Taken 9/15/2022 0000 by Parish Salguero RN  Electrolytes maintained within normal limits:   Monitor labs and assess patient for signs and symptoms of electrolyte imbalances   Administer electrolyte replacement as ordered   Monitor response to electrolyte replacements, including repeat lab results as appropriate  Taken 9/14/2022 1600 by Artie Ortiz RN  Electrolytes maintained within normal limits: Monitor labs and assess patient for signs and symptoms of electrolyte imbalances  Goal: Hemodynamic stability and optimal renal function maintained  9/15/2022 1540 by Adriana Casey RN  Outcome: Completed  9/15/2022 1539 by Mason Garsia RN  Outcome: Adequate for Discharge  Flowsheets  Taken 9/15/2022 1300  Hemodynamic stability and optimal renal function maintained:   Monitor labs and assess for signs and symptoms of volume excess or deficit   Monitor intake, output and patient weight   Monitor urine specific gravity, serum osmolarity and serum sodium as indicated or ordered   Monitor response to interventions for patient's volume status, including labs, urine output, blood pressure (other measures as available)   Encourage oral intake as appropriate   Instruct patient on fluid and nutrition restrictions as appropriate  Taken 9/15/2022 0915  Hemodynamic stability and optimal renal function maintained:   Monitor labs and assess for signs and symptoms of volume excess or deficit   Monitor intake, output and patient weight   Monitor urine specific gravity, serum osmolarity and serum sodium as indicated or ordered   Monitor response to interventions for patient's volume status, including labs, urine output, blood pressure (other measures as available)   Encourage oral intake as appropriate   Instruct patient on fluid and nutrition restrictions as appropriate  9/15/2022 0501 by Siobhan Triplett RN  Outcome: Progressing  Flowsheets  Taken 9/15/2022 0000 by Siobhan Triplett RN  Hemodynamic stability and optimal renal function maintained:   Monitor labs and assess for signs and symptoms of volume excess or deficit   Monitor intake, output and patient weight  Taken 9/14/2022 1600 by Jaylyn Dawson RN  Hemodynamic stability and optimal renal function maintained: Monitor labs and assess for signs and symptoms of volume excess or deficit  Goal: Glucose maintained within prescribed range  9/15/2022 1540 by Mason Garsia RN  Outcome: Completed  9/15/2022 1539 by Mason Garsia RN  Outcome: Adequate for Discharge  Flowsheets  Taken 9/15/2022 1300  Glucose maintained within prescribed range:   Monitor blood glucose as ordered   Assess for signs and symptoms of hyperglycemia and hypoglycemia   Administer ordered medications to maintain glucose within target range   Assess barriers to adequate nutritional intake and initiate nutrition consult as needed   Instruct patient on self management of diabetes and initiate consult as needed  Taken 9/15/2022 0915  Glucose maintained within prescribed range:   Monitor blood glucose as ordered   Assess for signs and symptoms of hyperglycemia and hypoglycemia   Administer ordered medications to maintain glucose within target range   Assess barriers to adequate nutritional intake and initiate nutrition consult as needed   Instruct patient on self management of diabetes and initiate consult as needed  9/15/2022 0501 by Roberth León RN  Outcome: Progressing  Flowsheets  Taken 9/15/2022 0000 by Roberth León RN  Glucose maintained within prescribed range:   Monitor blood glucose as ordered   Assess for signs and symptoms of hyperglycemia and hypoglycemia  Taken 9/14/2022 1600 by Danette To RN  Glucose maintained within prescribed range: Monitor blood glucose as ordered     Problem: Hematologic - Adult  Goal: Maintains hematologic stability  9/15/2022 1540 by Marcia Lutz RN  Outcome: Completed  9/15/2022 1539 by Marcia Lutz RN  Outcome: Adequate for Discharge  Flowsheets  Taken 9/15/2022 1300  Maintains hematologic stability:   Assess for signs and symptoms of bleeding or hemorrhage   Administer blood products/factors as ordered   Monitor labs for bleeding or clotting disorders  Taken 9/15/2022 0915  Maintains hematologic stability:   Assess for signs and symptoms of bleeding or hemorrhage   Monitor labs for bleeding or clotting disorders   Administer blood products/factors as ordered  9/15/2022 0501 by Roberth León RN  Outcome: Progressing  Flowsheets  Taken 9/15/2022 0000 by Roberth León RN  Maintains hematologic stability: Assess for signs and symptoms of bleeding or hemorrhage  Taken 9/14/2022 1600 by Kim Price RN  Maintains hematologic stability: Assess for signs and symptoms of bleeding or hemorrhage     Problem: ABCDS Injury Assessment  Goal: Absence of physical injury  9/15/2022 1540 by Piper William RN  Outcome: Completed  9/15/2022 1539 by Piper William RN  Outcome: Adequate for Discharge  Flowsheets (Taken 9/15/2022 0830)  Absence of Physical Injury: Implement safety measures based on patient assessment  9/15/2022 0501 by Jaqueline Schmitz RN  Outcome: Progressing     Problem: Skin/Tissue Integrity  Goal: Absence of new skin breakdown  Description: 1. Monitor for areas of redness and/or skin breakdown  2. Assess vascular access sites hourly  3. Every 4-6 hours minimum:  Change oxygen saturation probe site  4. Every 4-6 hours:  If on nasal continuous positive airway pressure, respiratory therapy assess nares and determine need for appliance change or resting period.   9/15/2022 1540 by Piper William RN  Outcome: Completed  9/15/2022 1539 by Piper William RN  Outcome: Adequate for Discharge  9/15/2022 0501 by Jaqueline Schmitz RN  Outcome: Progressing

## 2022-09-15 NOTE — PROGRESS NOTES
[facility]  Office: [address], DO, Corry Alen, DO, [place] Mail, DO, Matilde Has Blood, DO, Irvin Sellers MD, Joselo Garza MD, Martinez Yen MD, Tata Ordaz MD,  Ugo Vann MD, Keiko Winn MD, Jeffry Gonzalez, DO, Garima Ashton MD,  Da Lanza MD, Micheal Hernandez MD, Cassidy Donnelly, DO, Kylie Craft MD, Loida Bridges MD, Deion Hernandez MD, Georgia Romero MD, Ryan Ramey MD, Deric Tejada MD, Sharif Joya, DO, Ollie Torres MD, Hanna Guardado MD, Wen Mom, CNP,  Blanca Raw, CNP, Jordyn Downey, CNP, Dinorah Booty, CNP,  Nereida Reyesba, DNP, Siddharth Bolden, CNP, Vilma Beat, CNP, Darren Flores, CNP, Shakeel Calhoun, CNP, Vimal Matson, CNP, LEIDY Li-C, Sonny Quiñones, CNS, Remy Yates, DNP, Lai Early, CNP, Genny Harris, CNP, Chi Olvera, [facility]    Progress Note    9/15/2022    11:09 AM    Name:   Isaías Vega  MRN:     [id number]     Acct:      [de-identified]   Room:   [address] [place] [address] Day:  7  Admit Date:  9/8/2022 12:37 PM    PCP:   No primary care provider on file.   Code Status:  Full Code    Subjective:     C/C:   Chief Complaint   Patient presents with    Altered Mental Status     Interval History Status: .   Denies any new problem  Kidney function is trending down, current creatinine 2. 2 5 (baseline 1.8-2)  Medeiros's cath was removed per nephrology request  AFP is high-93.6-GI considers this to be possibly due to hepatic lobe infection, plans to recheck later as outpatient after finishing antibiotics  MRI abdomen shows possible early abscesses and left and right hepatic lobe, ID recommends antibiotics to be continued for 6 weeks, patient got PICC line now    Physical therapy recommending SNF, has been accepted there      Brief History:     Isaías Vega is 80 y.o. male  Who was admitted to the hospital on 9/8/2022 for treatment of Acute kidney injury superimposed on CKD ([facility] [address] Patient was brought to emergency room by ambulance after his wife called for patients change in mental status. Apparently patient overdosed on his sleep medication. He took medication within intent to fall asleep faster. Patient also reported that he has known history of arthritis and has been having difficulty with his joints. He was not moving much for last few days. He reports prior history of morbid obesity and weighted > 350 pounds. He underwent bariatric surgery > 25 years ago. He has prior history of diabetes mellitus however is not on any medication. Patient denies any other past medical history. On chart review it appears that patient has been admitted to [facility] with acute kidney injury and had elevated creatinine up to 3.5. Patient is very hard of hearing , he lives with his wife of > 35 Yrs. Patient denies any history of diarrhea, vomiting, nausea, fever. He denies any urinary difficulties, nocturia, retention. Initial evaluation emergency room showed hypotension with blood pressure 88/66 mmHg, blood glucose was significantly low 30's on first contact with EMS and repeat 60 on arrival to emergency room after dextrose. Lab evaluation showed hyponatremia 127, BUN 70, creatinine 4.34, bicarb 17, lactic acid 2.7, magnesium 1.3, troponin 58, albumin 1.8, AST 47, ALT 24, bilirubin 4.9. Alcohol levels negative. Urine tox was negative for cannabinoids, cocaine, methadone, fentanyl, oxycodone, amphetamine. Patient was found to have significant leukocytosis 49.3 with low PLT 96 .  CT abdomen pelvis showed diffuse wall thickening in ascending, transverse colon suggestive of colitis, bladder wall thickening possible cystitis, suboptimal evaluation of gallbladder fossa. Patient has large right upper quadrant open surgical scar in place. Unsure about gallbladder status.   He also has large midline surgical scar which she reports that is from bariatric surgery. Review of Systems:     Constitutional:  negative for chills, fevers, sweats, feels better than before  Respiratory:  negative for cough, dyspnea on exertion, shortness of breath, wheezing  Cardiovascular:  negative for chest pain, chest pressure/discomfort, lower extremity edema, palpitations  Gastrointestinal:  negative for abdominal pain, constipation, diarrhea, nausea, vomiting  Neurological:  negative for dizziness, headache    Medications: Allergies: Allergies   Allergen Reactions    Cefazolin Rash     maculopapular rash over his face, chest, torso, and back. Pt tolerated ceftriaxone during 2022 admission. Current Meds:   Scheduled Meds:    pantoprazole  40 mg Oral QAM AC    cefTRIAXone (ROCEPHIN) IV  1,000 mg IntraVENous Q24H    midodrine  10 mg Oral TID WC    sodium chloride flush  5-40 mL IntraVENous 2 times per day     Continuous Infusions:    sodium chloride 50 mL/hr at 09/15/22 0503    norepinephrine Stopped (09/10/22 1312)    dextrose      sodium chloride Stopped (22 1432)     PRN Meds: glucose, dextrose bolus **OR** dextrose bolus, glucagon (rDNA), dextrose, sodium chloride flush, sodium chloride, ondansetron **OR** ondansetron, acetaminophen **OR** acetaminophen    Data:     Past Medical History:   has a past medical history of Chronic back pain, Cirrhosis of liver without ascites ([facility] [address] and Diabetes mellitus ([facility] [address] Social History:   reports that he does not currently use alcohol. Family History:   Family History   Problem Relation Age of Onset    Heart Attack Sister        Vitals:  /66   Pulse 91   Temp 96.9 °F (36.1 °C)   Resp (!) 9   Ht 5' 9\" (1.753 m)   Wt 171 lb (77.6 kg)   SpO2 (!) 85%   BMI 25.25 kg/m²   Temp (24hrs), Av.9 °F (36.6 °C), Min:96.9 °F (36.1 °C), Max:98.7 °F (37.1 °C)    Recent Labs     22  0325 22  1630 09/15/22  0036 09/15/22  0733   POCGLU 79 129* 110 76         I/O (24Hr):     Intake/Output Summary (Last 24 hours) at 9/15/2022 1109  Last data filed at 9/15/2022 0929  Gross per 24 hour   Intake 1129.59 ml   Output 150 ml   Net 979.59 ml         Labs:  Hematology:  Recent Labs     09/13/22  0812 09/15/22  0642   WBC 13.8* 9.6   RBC 3.55* 3.38*   HGB 10.7* 10.1*   HCT 30.6* 30.0*   MCV 86.2 88.8   MCH 30.1 29.9   MCHC 35.0* 33.7   RDW 17.0* 17.2*   PLT 60* 68*   MPV 8.8 10.4       Chemistry:  Recent Labs     09/13/22  0344 09/14/22  0354 09/15/22  0642   * 132* 134*   K 5.3 4.7 4.5   CL 99 99 102   CO2 22 21 23   GLUCOSE 85 95 92   BUN 50* 43* 37*   CREATININE 2.74* 2.72* 2.25*   ANIONGAP 10 12 9   LABGLOM 22* 22* 28*   GFRAA 27* 27* 34*   CALCIUM 8.1* 8.0* 7.8*       Recent Labs     09/13/22  1655 09/13/22  1923 09/14/22  0325 09/14/22  1630 09/15/22  0036 09/15/22  0733   POCGLU 101 91 79 129* 110 76       ABG:No results found for: POCPH, PHART, PH, POCPCO2, QFJ4QBN, PCO2, POCPO2, PO2ART, PO2, POCHCO3, XBS9RZN, HCO3, NBEA, PBEA, BEART, BE, THGBART, THB, UQN7TAX, OFXQ0ICN, J4QJOHSY, O2SAT, FIO2  Lab Results   Component Value Date/Time    SPECIAL RIGHT HAND, 3ML 09/12/2022 11:25 AM     Lab Results   Component Value Date/Time    CULTURE NO GROWTH 2 DAYS 09/12/2022 11:25 AM       Radiology:  CT HEAD WO CONTRAST    Result Date: 9/8/2022  No acute intracranial abnormality. Prominent CSF spaces over the frontal and parietal lobes. No evidence of hydrocephalus. US GALLBLADDER RUQ    Result Date: 9/9/2022  1. Technically limited exam.  The gallbladder is not clearly identified. Based on recent CT exam, the gallbladder was relatively contracted with intermediate density fluid and stones. 2.  No evidence for biliary dilatation. 3.  Morphologic findings suggestive of cirrhosis with small perihepatic ascites and probable cyst.     XR CHEST PORTABLE    Result Date: 9/9/2022  1. Right internal jugular line terminates at the superior caval atrial junction.  2.  Shallow inflation with more prominent perihilar opacities. Similar appearance of peribronchial wall thickening. While this in part may represent atelectasis, developing edema should be considered. XR CHEST 1 VIEW    Result Date: 9/8/2022  Suboptimal evaluation secondary to patient positioning/rotation. No radiographic evidence of an acute cardiopulmonary process. MRI ABDOMEN WO CONTRAST MRCP    Result Date: 9/12/2022  Motion limited study, limiting evaluation of the bile ducts. Ill-defined T2 hyperintense nodular foci are seen in the left hepatic lobe and right hepatic lobe, not clearly simple cysts, presumably due to early abscesses given the history of sepsis with metastatic disease considered a less likely etiology. Cirrhosis and splenomegaly Findings of fluid overload denoted by bilateral pleural effusions, body wall anasarca, and abdominal and pelvic ascites Cholelithiasis     US RETROPERITONEAL COMPLETE    Result Date: 9/8/2022  Unremarkable ultrasound of the kidneys and urinary bladder. CT CHEST ABDOMEN PELVIS WO CONTRAST    Result Date: 9/8/2022  1. Diffuse wall thickening of the ascending and transverse colon may represent colitis. 2. Questionable rectal wall thickening may represent proctitis. 3. Suspected cholelithiasis. Suboptimal evaluation of gallbladder fossa. Can correlate with right upper quadrant ultrasound. 4. Mild bladder wall thickening may be related to underdistension versus possible cystitis. Can correlate with urinalysis. 5. Small amount of free fluid present around the liver and spleen. 6. Mild bilateral lower lobe patchy opacities are favored to represent atelectasis. However, an early inflammatory/infectious process is not excluded in the proper clinical setting. 7. Diffuse subcutaneous fat stranding may related to fluid overload. 8. Hypodense blood pool, may be related to the anemia.        Physical Examination:        General appearance:  alert, cooperative and no distress  Mental Status:  oriented to person, place and send results to nephrology-  Discharge to [facility]       Sandy Acosta MD  9/15/2022  11:09 AM

## 2022-09-15 NOTE — PROGRESS NOTES
[facility]  Progress Note    Hetal Sage  MRN: [id number]  Date: 9/15/2022  LOS: 7     Reason for F/U :   E coli sepsis    Impression :   E. coli sepsis secondary to E. coli urinary tract infection  Left and right hepatic lobe nodular foci presumably due to early abscess  Colitis by CT imaging  Leukocytosis  Acute kidney injury  Cirrhosis and splenomegaly  Diabetes mellitus type 2  Chronic back pain    Recommendations:   Continue IV antimicrobial therapy with ceftriaxone and the plan is to continue for 6 weeks to treat for the potential hepatic abscesses  Patient is okay for discharge from an infectious disease standpoint  Right upper extremity PICC line is in place  He will need weekly labs while on IV antibiotics  He will need follow-up imaging of the liver either by CT or MRI in the near future  He will need to follow up in the ID clinic with Dr. Nenita Rob    Infection Control Recommendations:   Universal precautions    Discharge Planning:   Estimated Length of IV antimicrobials: 6 weeks, 10/19/2022  Patient will need PICC line Insertion: yes  Patient will need: SNF  Patient willneed outpatient wound care: No    Medical Decision making / Summary of Stay:   Hetal Sage is a 80y.o.-year-old male who was initially admitted on 9/8/2022. Patient has known medical history of chronic back pain and diabetes mellitus type 2. Patient was brought to the emergency department at the discretion of his wife for altered mental status. He apparently took too many sleeping pills, stating that he wanted to fall asleep extra fast.  Upon arrival, blood sugars were in the 30s he did receive dextrose. Urinalysis with small amounts of leukoesterase, many bacteria. CT abdomen with diffuse wall thickening of the ascending and transverse colon which may represent colitis, questionable rectal wall thickening which may represent proctitis, bladder wall thickening which may represent cystitis.   The patient denies any abdominal pain or back pain. He denies any fevers or chills, or dysuria prior to admission. Blood cultures and urine culture have not come back positive for E. coli. He is currently on IV ceftriaxone. We have been consulted assist with the microbial therapy management    Current evaluation:9/15/2022    /66   Pulse 91   Temp 96.9 °F (36.1 °C)   Resp (!) 9   Ht 5' 9\" (1.753 m)   Wt 171 lb (77.6 kg)   SpO2 (!) 85%   BMI 25.25 kg/m²     Temperature Range: Temp: 96.9 °F (36.1 °C) Temp  Av.9 °F (36.6 °C)  Min: 96.9 °F (36.1 °C)  Max: 98.7 °F (37.1 °C)    The patient was seen sitting up in the bed  He has no complaints or needs    Review of Systems   Constitutional: Negative. Respiratory: Negative. Cardiovascular: Negative. Gastrointestinal: Negative. Genitourinary: Negative. Musculoskeletal: Negative. Skin: Negative. Neurological: Negative. Psychiatric/Behavioral: Negative. Physical Examination :     Physical Exam  Constitutional:       Appearance: Normal appearance. He is normal weight. HENT:      Head: Normocephalic and atraumatic. Cardiovascular:      Rate and Rhythm: Normal rate and regular rhythm. Pulmonary:      Effort: Pulmonary effort is normal. No respiratory distress. Breath sounds: Normal breath sounds. Abdominal:      General: Abdomen is flat. Bowel sounds are normal.      Palpations: Abdomen is soft. Musculoskeletal:         General: Normal range of motion. Right lower leg: No edema. Left lower leg: No edema. Skin:     General: Skin is warm and dry. Neurological:      General: No focal deficit present. Mental Status: He is alert and oriented to person, place, and time. Mental status is at baseline. Psychiatric:         Mood and Affect: Mood normal.         Behavior: Behavior normal.         Thought Content:  Thought content normal.       Laboratory data:   I have independently reviewed the followinglabs:  CBC with Differential:   Recent Labs     09/13/22  0812 09/15/22  0642   WBC 13.8* 9.6   HGB 10.7* 10.1*   HCT 30.6* 30.0*   PLT 60* 68*   LYMPHOPCT 7* 10*   MONOPCT 7 7       BMP:   Recent Labs     09/14/22  0354 09/15/22  0642   * 134*   K 4.7 4.5   CL 99 102   CO2 21 23   BUN 43* 37*   CREATININE 2.72* 2.25*       Hepatic Function Panel:   No results for input(s): PROT, LABALBU, BILIDIR, IBILI, BILITOT, ALKPHOS, ALT, AST in the last 72 hours. No results found for: PROCAL  No results found for: CRP  No results found for: SEDRATE      No results found for: DDIMER  No results found for: FERRITIN  No results found for: LDH  No results found for: FIBRINOGEN    No results found for requested labs within last 30 days. No results found for: COVID19    No results for input(s): VANCOTROUGH in the last 72 hours. Imaging Studies:   No new imaging    Cultures:     Culture, Blood 1 [[id number]] Collected: 09/12/22 1125   Order Status: Completed Specimen: Blood Updated: 09/13/22 1423    Specimen Description . BLOOD    Special Requests RIGHT HAND, 3ML    Culture NO GROWTH 1 DAY   Culture, Blood 1 [[id number]] Collected: 09/12/22 1028   Order Status: Completed Specimen: Blood Updated: 09/13/22 1231    Specimen Description . BLOOD    Special Requests LEFT HAND, 10ML    Culture NO GROWTH 1 DAY     Component 9/8/22 5700    Specimen Description . CLEAN CATCH URINE    Culture ESCHERICHIA COLI >[id number] CFU/ML Abnormal     Resulting [address]        Susceptibility    Escherichia coli (1)    Antibiotic Interpretation Microscan Method Status    ampicillin Sensitive 4 BACTERIAL SUSCEPTIBILITY PANEL SANDRA Final    aztreonam Sensitive <=1 BACTERIAL SUSCEPTIBILITY PANEL SANDRA Final    ceFAZolin Sensitive <=4 BACTERIAL SUSCEPTIBILITY PANEL SANDRA Final     Cefazolin sensitivity results can be used to predict the effectiveness of oral   cephalosporins (eg.  Cephalexin) in uncomplicated Urinary Tract Infections due to E. coli, K.    pneumoniae, and P. mirabilis        cefTRIAXone Sensitive <=1 BACTERIAL SUSCEPTIBILITY PANEL SANDRA Final    ciprofloxacin Sensitive <=0.25 BACTERIAL SUSCEPTIBILITY PANEL SANDRA Final    Confirmatory Extended Spectrum Beta-Lactamase Negative NEGATIVE BACTERIAL SUSCEPTIBILITY PANEL SANDRA Final    gentamicin Sensitive <=1 BACTERIAL SUSCEPTIBILITY PANEL SANDRA Final    nitrofurantoin Sensitive <=16 BACTERIAL SUSCEPTIBILITY PANEL SANDRA Final    tobramycin Sensitive <=1 BACTERIAL SUSCEPTIBILITY PANEL SANDRA Final    trimethoprim-sulfamethoxazole Sensitive <=20 BACTERIAL SUSCEPTIBILITY PANEL SANDRA Final    piperacillin-tazobactam Sensitive <=4 BACTERIAL SUSCEPTIBILITY PANEL SANDRA Final          Specimen Collected: 09/08/22 17:44 EDT Last Resulted: 09/10/22 13:10 EDT        Lab Flowsheet     Order Details     View Encounter     Lab and Collection Details     Routing     Result History     View Encounter Conversation           Result Care Coordination      Patient Communication     Not Released  Not seen Back to Top           Testing Performed By:    [address] [place] [address] LAB   [address] [place] [address]   Tel: [phone]   : Keaton Lewis MD                Result Information    Flag: Abnormal Abnormal   Status: Final result (Collected: 9/8/2022 17:44)     Culture, Blood 1 [[id number]] (Abnormal) Collected: 09/08/22 1410   Order Status: Completed Specimen: Blood Updated: 09/10/22 0808    Specimen Description . BLOOD    Special Requests LFA 12ML    Culture POSITIVE Blood Culture Abnormal      DIRECT GRAM STAIN FROM BOTTLE: GRAM NEGATIVE RODS     ESCHERICHIA COLI For susceptibility, refer to previous culture. Abnormal      (NOTE) Direct Gram Stain from bottle result called to and read back by:JORDAN ORTIZ 9/9/22 @0420   Culture, Blood 1 [[id number]] (Abnormal)  Collected: 09/08/22 1419   Order Status: Completed Specimen: Blood Updated: 09/10/22 0807    Specimen Description . BLOOD    Special Requests RFA 12ML    Culture POSITIVE Blood Culture Abnormal      DIRECT GRAM STAIN FROM BOTTLE: GRAM NEGATIVE RODS     Filmarray BCID Detected: Escherichia coli Detected: Methodology- Polymerase Chain Reaction (PCR)     ESCHERICHIA COLI Abnormal      (NOTE) Direct Gram Stain from bottle and Polymerase Chain Reaction (PCR) results called to and read back by:JORDAN ORTIZ 9/9/22 @0420     Medications:      pantoprazole  40 mg Oral QAM AC    cefTRIAXone (ROCEPHIN) IV  1,000 mg IntraVENous Q24H    midodrine  10 mg Oral TID WC    sodium chloride flush  5-40 mL IntraVENous 2 times per day           [facility]  Ignacia Garcia, [address] messaging  OFFICE: [phone]      Electronically signed by DIONICIO [place] - CNP on 9/15/2022 at 8:24 AM  Thank you for allowing us to participate in the care of this patient. Please call with questions. This note iscreated with the assistance of a speech recognition program.  While intending to generate a document that actually reflects the content of the visit, the document can still have some errors including those of syntax andsound a like substitutions which may escape proof reading. In such instances, actual meaning can be extrapolated by contextual diversion.

## 2022-09-15 NOTE — PROGRESS NOTES
RN called [facility], 1st attempt, call transferred and call lost. 2nd attempt transferred to [facility], [facility] desk, left message that I was calling to give report on patient Vj Mo coming from [facility]. [facility] ICU gave my name and phone number to call back. Attempted to call a 3rd time  then sent me back to [facility] management and got voice mail again.

## 2022-09-15 NOTE — PLAN OF CARE
Problem: Discharge Planning  Goal: Discharge to home or other facility with appropriate resources  Outcome: Progressing  Flowsheets  Taken 9/15/2022 0000 by Onelia Galeana RN  Discharge to home or other facility with appropriate resources:   Identify barriers to discharge with patient and caregiver   Arrange for needed discharge resources and transportation as appropriate    Problem: Pain  Goal: Verbalizes/displays adequate comfort level or baseline comfort level  Outcome: Progressing    Problem: Safety - Adult  Goal: Free from fall injury  Outcome: Progressing     Problem: Chronic Conditions and Co-morbidities  Goal: Patient's chronic conditions and co-morbidity symptoms are monitored and maintained or improved  Outcome: Progressing  Flowsheets  Taken 9/15/2022 0000 by Onelia Galeana RN  Care Plan - Patient's Chronic Conditions and Co-Morbidity Symptoms are Monitored and Maintained or Improved: Monitor and assess patient's chronic conditions and comorbid symptoms for stability, deterioration, or improvement    Problem: Respiratory - Adult  Goal: Achieves optimal ventilation and oxygenation  Outcome: Progressing  Flowsheets  Taken 9/15/2022 0000 by Onelia Galeana RN  Achieves optimal ventilation and oxygenation:   Assess for changes in respiratory status   Assess for changes in mentation and behavior   Position to facilitate oxygenation and minimize respiratory effort    Problem: Cardiovascular - Adult  Goal: Absence of cardiac dysrhythmias or at baseline  Outcome: Progressing  Flowsheets (Taken 9/15/2022 0000)  Absence of cardiac dysrhythmias or at baseline:   Monitor cardiac rate and rhythm   Assess for signs of decreased cardiac output     Problem: Skin/Tissue Integrity - Adult  Goal: Skin integrity remains intact  Outcome: Progressing  Flowsheets  Taken 9/15/2022 0000 by Onelia Galeana RN  Skin Integrity Remains Intact: Monitor for areas of redness and/or skin breakdown    Problem: Musculoskeletal - Adult  Goal: Return mobility to safest level of function  Outcome: Progressing  Flowsheets  Taken 9/15/2022 0000 by Haydee Gonzalez RN  Return Mobility to Safest Level of Function: Assist with transfers and ambulation using safe patient handling equipment as needed    Problem: Musculoskeletal - Adult  Goal: Return ADL status to a safe level of function  Outcome: Progressing  Flowsheets  Taken 9/15/2022 0000 by Haydee Gonzalez RN  Return ADL Status to a Safe Level of Function:   Administer medication as ordered   Assess activities of daily living deficits and provide assistive devices as needed    Problem: Gastrointestinal - Adult  Goal: Minimal or absence of nausea and vomiting  Outcome: Progressing  Flowsheets  Taken 9/15/2022 0000 by Haydee Gonzalez RN  Minimal or absence of nausea and vomiting: Administer IV fluids as ordered to ensure adequate hydration    Problem: Gastrointestinal - Adult  Goal: Maintains or returns to baseline bowel function  Outcome: Progressing  Flowsheets  Taken 9/15/2022 0000 by Haydee Gonzalez RN  Maintains or returns to baseline bowel function:   Assess bowel function   Encourage oral fluids to ensure adequate hydration    Problem: Gastrointestinal - Adult  Goal: Establish and maintain optimal ostomy function  Outcome: Progressing  Flowsheets  Taken 9/15/2022 0000 by Haydee Gonzalez RN  Establish and maintain optimal ostomy function: Administer IV fluids and TPN as ordered    Problem: Genitourinary - Adult  Goal: Absence of urinary retention  Outcome: Progressing  Flowsheets  Taken 9/15/2022 0000 by Haydee Gonzalez RN  Absence of urinary retention:   Assess patients ability to void and empty bladder   Monitor intake/output and perform bladder scan as needed    Problem: Infection - Adult  Goal: Absence of infection at discharge  Outcome: Progressing  Flowsheets  Taken 9/15/2022 0000 by Haydee Gonzalez RN  Absence of infection at discharge:   Assess and monitor for signs and areas of redness and/or skin breakdown  2. Assess vascular access sites hourly  3. Every 4-6 hours minimum:  Change oxygen saturation probe site  4. Every 4-6 hours:  If on nasal continuous positive airway pressure, respiratory therapy assess nares and determine need for appliance change or resting period.   Outcome: Progressing

## 2022-09-15 NOTE — CARE COORDINATION
Social Work-Patient will dc to [facility]. Discussed with wife. She is agreeable. Orders faxed. Nurse to call report to [phone]. JJ completed.  Edith Foster

## 2022-09-15 NOTE — PROGRESS NOTES
Pulmonary Critical Care Progress Note  Leda Singh MD     Patient seen for the follow up of abscess, bilateral pleural effusions/atelectasis, Acute kidney injury superimposed on CKD ([address]     Subjective:  Patient is resting comfortably in bed, no distress. He is on room air saturating 95%. He denies shortness of breath, cough or chest pain. Patient pulled his central line last night. Examination:  Vitals: /66   Pulse 91   Temp 96.9 °F (36.1 °C)   Resp (!) 9   Ht 5' 9\" (1.753 m)   Wt 171 lb (77.6 kg)   SpO2 (!) 85%   BMI 25.25 kg/m²   General appearance: alert and cooperative with exam  Neck: No JVD  Lungs: Moderate air exchange, no crackles or wheezing  Heart: regular rate and rhythm, S1, S2 normal, no gallop  Abdomen: Soft, non tender, + BS  Extremities: no cyanosis or clubbing.  No significant edema    LABs:  CBC:   Recent Labs     09/13/22  0812 09/15/22  0642   WBC 13.8* 9.6   HGB 10.7* 10.1*   HCT 30.6* 30.0*   PLT 60* 68*       BMP:   Recent Labs     09/14/22  0354 09/15/22  0642   * 134*   K 4.7 4.5   CO2 21 23   BUN 43* 37*   CREATININE 2.72* 2.25*   LABGLOM 22* 28*   GLUCOSE 95 92       Radiology:  X-ray chest 9/9/2022      Impression:  Septic shock/UTI/cholangitis  Bilateral pleural effusions/atelectasis  Right hepatic lobe nodular foci likely secondary to early abscess/Elevated AFP/liver cirrhosis  Acute renal failure/metabolic acidosis/hyponatremia/hypocalcemia  Altered mental status  Diabetes mellitus/recurrent hypoglycemia secondary to sepsis  Levophed subcutaneous infiltration    Recommendations:  Currently on room air, oxygen via nasal cannula, if necessary to keep SPO2 90% or greater  Incentive spirometry every hour while awake  DuoNeb every 4 hours as needed  Rocephin per infectious disease for 6 weeks  ProAmatine 10 mg 3 times daily/monitor BP off of vasopressors  Nephrology following  GI on consult, s/p MRCP/outpatient colonoscopy to evaluate rectal wall thickening as an outpatient  Palliative care on consult  PT/OT  Discontinue IV fluids  GI prophylaxis, Protonix  DVT prophylaxis with EPCs  Discharge planning to SNF when all arrangements can be made. No objection from pulmonary standpoint.   Discussed with patient's family present at the bedside  Will follow with you  Electronically signed by     Gage Durbin MD on 9/15/2022 at 11:02 AM  Pulmonary Critical Care and Sleep Medicine,  [facility]  Cell: [phone]  Office: [phone]

## 2022-09-15 NOTE — PLAN OF CARE
Problem: Discharge Planning  Goal: Discharge to home or other facility with appropriate resources  9/15/2022 1539 by Igor Wharton RN  Outcome: Adequate for Discharge  Flowsheets  Taken 9/15/2022 1300  Discharge to home or other facility with appropriate resources:   Identify barriers to discharge with patient and caregiver   Arrange for needed discharge resources and transportation as appropriate   Identify discharge learning needs (meds, wound care, etc)   Refer to discharge planning if patient needs post-hospital services based on physician order or complex needs related to functional status, cognitive ability or social support system  Taken 9/15/2022 0915  Discharge to home or other facility with appropriate resources:   Identify barriers to discharge with patient and caregiver   Arrange for needed discharge resources and transportation as appropriate   Identify discharge learning needs (meds, wound care, etc)   Refer to discharge planning if patient needs post-hospital services based on physician order or complex needs related to functional status, cognitive ability or social support system  9/15/2022 0501 by Fei Rapp RN  Outcome: Progressing  Flowsheets  Taken 9/15/2022 0000 by Fei Rapp RN  Discharge to home or other facility with appropriate resources:   Identify barriers to discharge with patient and caregiver   Arrange for needed discharge resources and transportation as appropriate  Taken 9/14/2022 1600 by Trey Spurling, RN  Discharge to home or other facility with appropriate resources: Identify barriers to discharge with patient and caregiver     Problem: Pain  Goal: Verbalizes/displays adequate comfort level or baseline comfort level  9/15/2022 1539 by Igor Wharton RN  Outcome: Adequate for Discharge  9/15/2022 0501 by Fei Rapp RN  Outcome: Progressing  Flowsheets (Taken 9/14/2022 1800 by Trey Spurling, RN)  Verbalizes/displays adequate comfort level or baseline comfort deterioration, or improvement  Taken 9/14/2022 1600 by Alecia Godinez RN  Care Plan - Patient's Chronic Conditions and Co-Morbidity Symptoms are Monitored and Maintained or Improved: Monitor and assess patient's chronic conditions and comorbid symptoms for stability, deterioration, or improvement     Problem: Neurosensory - Adult  Goal: Achieves stable or improved neurological status  9/15/2022 1539 by Jorge Figureoa RN  Outcome: Adequate for Discharge  Flowsheets  Taken 9/15/2022 1300  Achieves stable or improved neurological status:   Assess for and report changes in neurological status   Initiate measures to prevent increased intracranial pressure   Maintain blood pressure and fluid volume within ordered parameters to optimize cerebral perfusion and minimize risk of hemorrhage   Monitor temperature, glucose, and sodium. Initiate appropriate interventions as ordered  Taken 9/15/2022 0915  Achieves stable or improved neurological status:   Assess for and report changes in neurological status   Initiate measures to prevent increased intracranial pressure   Maintain blood pressure and fluid volume within ordered parameters to optimize cerebral perfusion and minimize risk of hemorrhage   Monitor temperature, glucose, and sodium.  Initiate appropriate interventions as ordered  9/15/2022 0501 by Edwin Urban RN  Outcome: Progressing  Flowsheets (Taken 9/14/2022 1600 by Alecia Godinez RN)  Achieves stable or improved neurological status: Assess for and report changes in neurological status     Problem: Respiratory - Adult  Goal: Achieves optimal ventilation and oxygenation  9/15/2022 1539 by Jorge Figueroa RN  Outcome: Adequate for Discharge  Flowsheets  Taken 9/15/2022 1300  Achieves optimal ventilation and oxygenation:   Assess for changes in respiratory status   Assess for changes in mentation and behavior   Position to facilitate oxygenation and minimize respiratory effort   Oxygen supplementation based on oxygen saturation or arterial blood gases   Encourage broncho-pulmonary hygiene including cough, deep breathe, incentive spirometry   Assess the need for suctioning and aspirate as needed   Assess and instruct to report shortness of breath or any respiratory difficulty   Respiratory therapy support as indicated  Taken 9/15/2022 0915  Achieves optimal ventilation and oxygenation:   Assess for changes in respiratory status   Assess for changes in mentation and behavior   Position to facilitate oxygenation and minimize respiratory effort   Oxygen supplementation based on oxygen saturation or arterial blood gases   Encourage broncho-pulmonary hygiene including cough, deep breathe, incentive spirometry   Assess the need for suctioning and aspirate as needed   Assess and instruct to report shortness of breath or any respiratory difficulty   Respiratory therapy support as indicated  9/15/2022 0501 by Ansley Elder RN  Outcome: Progressing  Flowsheets  Taken 9/15/2022 0000 by Ansley Elder RN  Achieves optimal ventilation and oxygenation:   Assess for changes in respiratory status   Assess for changes in mentation and behavior   Position to facilitate oxygenation and minimize respiratory effort  Taken 9/14/2022 1600 by Flakito Plaza RN  Achieves optimal ventilation and oxygenation: Assess for changes in respiratory status     Problem: Cardiovascular - Adult  Goal: Absence of cardiac dysrhythmias or at baseline  9/15/2022 1539 by Norma Anderson RN  Outcome: Adequate for Discharge  Flowsheets  Taken 9/15/2022 1300  Absence of cardiac dysrhythmias or at baseline:   Monitor cardiac rate and rhythm   Assess for signs of decreased cardiac output   Administer antiarrhythmia medication and electrolyte replacement as ordered  Taken 9/15/2022 0915  Absence of cardiac dysrhythmias or at baseline:   Monitor cardiac rate and rhythm   Assess for signs of decreased cardiac output   Administer antiarrhythmia medication and electrolyte replacement as ordered  9/15/2022 0501 by Ansley Elder RN  Outcome: Progressing  Flowsheets (Taken 9/15/2022 0000)  Absence of cardiac dysrhythmias or at baseline:   Monitor cardiac rate and rhythm   Assess for signs of decreased cardiac output     Problem: Skin/Tissue Integrity - Adult  Goal: Skin integrity remains intact  9/15/2022 1539 by Norma Anderson RN  Outcome: Adequate for Discharge  Flowsheets  Taken 9/15/2022 1300  Skin Integrity Remains Intact:   Monitor for areas of redness and/or skin breakdown   Assess vascular access sites hourly  Taken 9/15/2022 0915  Skin Integrity Remains Intact:   Monitor for areas of redness and/or skin breakdown   Assess vascular access sites hourly  Taken 9/15/2022 0830  Skin Integrity Remains Intact:   Monitor for areas of redness and/or skin breakdown   Assess vascular access sites hourly  9/15/2022 0501 by Ansley Elder RN  Outcome: Progressing  Flowsheets  Taken 9/15/2022 0000 by Ansley Elder RN  Skin Integrity Remains Intact: Monitor for areas of redness and/or skin breakdown  Taken 9/14/2022 1600 by Flakito Plaza RN  Skin Integrity Remains Intact: Monitor for areas of redness and/or skin breakdown  Goal: Oral mucous membranes remain intact  9/15/2022 1539 by Norma Anderson RN  Outcome: Adequate for Discharge  Flowsheets  Taken 9/15/2022 1300  Oral Mucous Membranes Remain Intact:   Assess oral mucosa and hygiene practices   Implement preventative oral hygiene regimen   Implement oral medicated treatments as ordered  Taken 9/15/2022 0830  Oral Mucous Membranes Remain Intact:   Assess oral mucosa and hygiene practices   Implement preventative oral hygiene regimen   Implement oral medicated treatments as ordered  9/15/2022 0501 by Ansley Elder RN  Outcome: Progressing  Flowsheets  Taken 9/15/2022 0000 by Ansley Elder RN  Oral Mucous Membranes Remain Intact: Assess oral mucosa and hygiene practices  Taken 9/14/2022 1600 by Flakito Plaza RN  Oral Mucous Membranes Remain Intact: Assess oral mucosa and hygiene practices     Problem: Musculoskeletal - Adult  Goal: Return mobility to safest level of function  9/15/2022 1539 by Sebastian Godinez RN  Outcome: Adequate for Discharge  Flowsheets  Taken 9/15/2022 1300  Return Mobility to Safest Level of Function:   Assess patient stability and activity tolerance for standing, transferring and ambulating with or without assistive devices   Assist with transfers and ambulation using safe patient handling equipment as needed   Ensure adequate protection for wounds/incisions during mobilization   Obtain physical therapy/occupational therapy consults as needed   Instruct patient/family in ordered activity level  Taken 9/15/2022 0915  Return Mobility to Safest Level of Function:   Assess patient stability and activity tolerance for standing, transferring and ambulating with or without assistive devices   Assist with transfers and ambulation using safe patient handling equipment as needed   Ensure adequate protection for wounds/incisions during mobilization   Obtain physical therapy/occupational therapy consults as needed   Instruct patient/family in ordered activity level  9/15/2022 0501 by Analilia Beach RN  Outcome: Progressing  Flowsheets  Taken 9/15/2022 0000 by Analilia Beach RN  Return Mobility to Safest Level of Function: Assist with transfers and ambulation using safe patient handling equipment as needed  Taken 9/14/2022 1600 by Cathy Easton RN  Return Mobility to Safest Level of Function: Assess patient stability and activity tolerance for standing, transferring and ambulating with or without assistive devices  Goal: Maintain proper alignment of affected body part  9/15/2022 1539 by Sebastian Godinez RN  Outcome: Adequate for Discharge  Flowsheets  Taken 9/15/2022 1300  Maintain proper alignment of affected body part:   Support and protect limb and body alignment per provider's orders   Instruct and reinforce with patient and family use of appropriate assistive device and precautions (e.g. spinal or hip dislocation precautions)  Taken 9/15/2022 0915  Maintain proper alignment of affected body part:   Support and protect limb and body alignment per provider's orders   Instruct and reinforce with patient and family use of appropriate assistive device and precautions (e.g. spinal or hip dislocation precautions)  9/15/2022 0501 by Edwin Urban RN  Outcome: Progressing  Flowsheets  Taken 9/15/2022 0000 by Edwin Urban RN  Maintain proper alignment of affected body part: Support and protect limb and body alignment per provider's orders  Taken 9/14/2022 1600 by Alecia Godinez RN  Maintain proper alignment of affected body part: Support and protect limb and body alignment per provider's orders  Goal: Return ADL status to a safe level of function  9/15/2022 1539 by Jorge Figueroa RN  Outcome: Adequate for Discharge  Flowsheets  Taken 9/15/2022 1300  Return ADL Status to a Safe Level of Function:   Administer medication as ordered   Assess activities of daily living deficits and provide assistive devices as needed   Obtain physical therapy/occupational therapy consults as needed   Assist and instruct patient to increase activity and self care as tolerated  Taken 9/15/2022 0915  Return ADL Status to a Safe Level of Function:   Administer medication as ordered   Assess activities of daily living deficits and provide assistive devices as needed   Obtain physical therapy/occupational therapy consults as needed   Assist and instruct patient to increase activity and self care as tolerated  9/15/2022 0501 by Edwin Urban RN  Outcome: Progressing  Flowsheets  Taken 9/15/2022 0000 by Edwin Urban RN  Return ADL Status to a Safe Level of Function:   Administer medication as ordered   Assess activities of daily living deficits and provide assistive devices as needed  Taken 9/14/2022 1600 by Alecia Godinez RN  Return ADL Status to a Safe Level of Function: Administer medication as ordered     Problem: Gastrointestinal - Adult  Goal: Minimal or absence of nausea and vomiting  9/15/2022 1539 by Jessica Lorenz RN  Outcome: Adequate for Discharge  Flowsheets  Taken 9/15/2022 1300  Minimal or absence of nausea and vomiting:   Administer IV fluids as ordered to ensure adequate hydration   Maintain NPO status until nausea and vomiting are resolved   Administer ordered antiemetic medications as needed   Provide nonpharmacologic comfort measures as appropriate   Advance diet as tolerated, if ordered   Nutrition consult to assist patient with adequate nutrition and appropriate food choices  Taken 9/15/2022 0915  Minimal or absence of nausea and vomiting:   Administer IV fluids as ordered to ensure adequate hydration   Maintain NPO status until nausea and vomiting are resolved   Administer ordered antiemetic medications as needed   Provide nonpharmacologic comfort measures as appropriate   Advance diet as tolerated, if ordered   Nutrition consult to assist patient with adequate nutrition and appropriate food choices  9/15/2022 0501 by Selene Perea RN  Outcome: Progressing  Flowsheets  Taken 9/15/2022 0000 by Seelne Perea RN  Minimal or absence of nausea and vomiting: Administer IV fluids as ordered to ensure adequate hydration  Taken 9/14/2022 1600 by Jaden Castro RN  Minimal or absence of nausea and vomiting: Administer IV fluids as ordered to ensure adequate hydration  Goal: Maintains or returns to baseline bowel function  9/15/2022 1539 by Jessica Lorenz RN  Outcome: Adequate for Discharge  Flowsheets  Taken 9/15/2022 1300  Maintains or returns to baseline bowel function:   Assess bowel function   Encourage oral fluids to ensure adequate hydration   Administer IV fluids as ordered to ensure adequate hydration   Administer ordered medications as needed   Encourage mobilization and activity   Nutrition consult to assist patient with appropriate food choices  Taken 9/15/2022 0915  Maintains or returns to baseline bowel function:   Assess bowel function   Encourage oral fluids to ensure adequate hydration   Encourage mobilization and activity   Nutrition consult to assist patient with appropriate food choices   Administer ordered medications as needed   Administer IV fluids as ordered to ensure adequate hydration  9/15/2022 0501 by Marisa Bryan RN  Outcome: Progressing  Flowsheets  Taken 9/15/2022 0000 by Marisa Bryan RN  Maintains or returns to baseline bowel function:   Assess bowel function   Encourage oral fluids to ensure adequate hydration  Taken 9/14/2022 1600 by Caprice Judd RN  Maintains or returns to baseline bowel function: Assess bowel function  Goal: Maintains adequate nutritional intake  9/15/2022 1539 by Cruzito Sanchez RN  Outcome: Adequate for Discharge  Flowsheets  Taken 9/15/2022 1300  Maintains adequate nutritional intake:   Monitor percentage of each meal consumed   Identify factors contributing to decreased intake, treat as appropriate   Assist with meals as needed   Monitor intake and output, weight and lab values   Obtain nutritional consult as needed  Taken 9/15/2022 0915  Maintains adequate nutritional intake:   Monitor percentage of each meal consumed   Identify factors contributing to decreased intake, treat as appropriate   Assist with meals as needed   Monitor intake and output, weight and lab values   Obtain nutritional consult as needed  9/15/2022 0501 by Marisa Bryan RN  Outcome: Progressing  Flowsheets  Taken 9/15/2022 0000 by Marisa Bryan RN  Maintains adequate nutritional intake:   Monitor percentage of each meal consumed   Identify factors contributing to decreased intake, treat as appropriate  Taken 9/14/2022 1600 by Caprice Judd RN  Maintains adequate nutritional intake: Monitor percentage of each meal consumed  Goal: Establish and maintain optimal ostomy function  9/15/2022 1539 by Jairo Srivastava JORDAN Bliss  Outcome: Adequate for Discharge  Flowsheets  Taken 9/15/2022 1300  Establish and maintain optimal ostomy function:   Monitor output from ostomies   Administer IV fluids and TPN as ordered   Nutrition consult   Infuse IV Fluids/TPN as ordered  Taken 9/15/2022 0915  Establish and maintain optimal ostomy function:   Introduce and advance enteral feedings as ordered   Administer IV fluids and TPN as ordered   Monitor output from ostomies   Nutrition consult   Infuse IV Fluids/TPN as ordered  9/15/2022 0501 by Marti Pedro RN  Outcome: Progressing  Flowsheets  Taken 9/15/2022 0000 by Marti Pedro RN  Establish and maintain optimal ostomy function: Administer IV fluids and TPN as ordered  Taken 9/14/2022 1600 by Celine Gutierrez RN  Establish and maintain optimal ostomy function: Administer IV fluids and TPN as ordered     Problem: Genitourinary - Adult  Goal: Absence of urinary retention  9/15/2022 1539 by Gregary Runner, RN  Outcome: Adequate for Discharge  Flowsheets  Taken 9/15/2022 1300  Absence of urinary retention:   Assess patients ability to void and empty bladder   Monitor intake/output and perform bladder scan as needed   Place urinary catheter per Licensed Independent Practitioner order if needed   Discuss with Licensed Independent Practitioner  medications to alleviate retention as needed   Discuss catheterization for long term situations as appropriate  Taken 9/15/2022 0915  Absence of urinary retention:   Assess patients ability to void and empty bladder   Monitor intake/output and perform bladder scan as needed  9/15/2022 0501 by Marti Pedro RN  Outcome: Progressing  Flowsheets  Taken 9/15/2022 0000 by Marti Pedro RN  Absence of urinary retention:   Assess patients ability to void and empty bladder   Monitor intake/output and perform bladder scan as needed  Taken 9/14/2022 1600 by Celine Gutierrez RN  Absence of urinary retention: Assess patients ability to void and empty bladder  Goal: Urinary catheter remains patent  9/15/2022 1539 by Nataly Zamora RN  Outcome: Adequate for Discharge  Flowsheets  Taken 9/15/2022 1300  Urinary catheter remains patent:   Assess patency of urinary catheter   Irrigate catheter per Licensed Independent Practitioner order if indicated and notify Licensed Independent Practitioner if unable to irrigate   Assess need for a larger catheter size or a 3-way catheter for continuous bladder irrigation  Taken 9/15/2022 0915  Urinary catheter remains patent:   Assess patency of urinary catheter   Irrigate catheter per Licensed Independent Practitioner order if indicated and notify Licensed Independent Practitioner if unable to irrigate   Assess need for a larger catheter size or a 3-way catheter for continuous bladder irrigation  9/15/2022 0501 by Jeannett Felty, RN  Outcome: Progressing  Flowsheets  Taken 9/15/2022 0000 by Jeannett Felty, RN  Urinary catheter remains patent: Assess patency of urinary catheter  Taken 9/14/2022 1600 by Mago Nino RN  Urinary catheter remains patent: Assess patency of urinary catheter     Problem: Infection - Adult  Goal: Absence of infection at discharge  9/15/2022 1539 by Nataly Zamora RN  Outcome: Adequate for Discharge  Flowsheets  Taken 9/15/2022 1300  Absence of infection at discharge:   Assess and monitor for signs and symptoms of infection   Monitor lab/diagnostic results   Monitor all insertion sites i.e., indwelling lines, tubes and drains   Administer medications as ordered   Identify and instruct in appropriate isolation precautions for identified infection/condition   Instruct and encourage patient and family to use good hand hygiene technique  Taken 9/15/2022 0915  Absence of infection at discharge:   Assess and monitor for signs and symptoms of infection   Monitor lab/diagnostic results   Monitor all insertion sites i.e., indwelling lines, tubes and drains   Administer medications as ordered   Instruct and encourage patient and family to use good hand hygiene technique   Identify and instruct in appropriate isolation precautions for identified infection/condition  9/15/2022 0501 by Jeannett Felty, RN  Outcome: Progressing  Flowsheets  Taken 9/15/2022 0000 by Jeannett Felty, RN  Absence of infection at discharge:   Assess and monitor for signs and symptoms of infection   Monitor lab/diagnostic results  Taken 9/14/2022 1600 by Mago Nino RN  Absence of infection at discharge: Assess and monitor for signs and symptoms of infection  Goal: Absence of infection during hospitalization  9/15/2022 1539 by Nataly Zamora RN  Outcome: Adequate for Discharge  Flowsheets  Taken 9/15/2022 1300  Absence of infection during hospitalization:   Assess and monitor for signs and symptoms of infection   Monitor lab/diagnostic results   Monitor all insertion sites i.e., indwelling lines, tubes and drains   Administer medications as ordered   Instruct and encourage patient and family to use good hand hygiene technique   Identify and instruct in appropriate isolation precautions for identified infection/condition  Taken 9/15/2022 0915  Absence of infection during hospitalization:   Assess and monitor for signs and symptoms of infection   Monitor lab/diagnostic results   Monitor all insertion sites i.e., indwelling lines, tubes and drains   Instruct and encourage patient and family to use good hand hygiene technique   Administer medications as ordered   Identify and instruct in appropriate isolation precautions for identified infection/condition  9/15/2022 0501 by Jeannett Felty, RN  Outcome: Progressing  Flowsheets  Taken 9/15/2022 0000 by Jeannett Felty, RN  Absence of infection during hospitalization:   Assess and monitor for signs and symptoms of infection   Monitor lab/diagnostic results  Taken 9/14/2022 1600 by Mago Nino RN  Absence of infection during hospitalization: Assess and monitor for signs and symptoms of infection  Goal: Absence of fever/infection during anticipated neutropenic period  9/15/2022 1539 by Marcia Lutz RN  Outcome: Adequate for Discharge  Flowsheets  Taken 9/15/2022 1300  Absence of fever/infection during anticipated neutropenic period:   Monitor white blood cell count   Administer growth factors as ordered   Implement neutropenic guidelines  Taken 9/15/2022 0915  Absence of fever/infection during anticipated neutropenic period:   Monitor white blood cell count   Administer growth factors as ordered   Implement neutropenic guidelines  9/15/2022 0501 by Roberth León RN  Outcome: Progressing  Flowsheets  Taken 9/15/2022 0000 by Roberth León RN  Absence of fever/infection during anticipated neutropenic period: Monitor white blood cell count  Taken 9/14/2022 1600 by Danette To RN  Absence of fever/infection during anticipated neutropenic period: Monitor white blood cell count     Problem: Metabolic/Fluid and Electrolytes - Adult  Goal: Electrolytes maintained within normal limits  9/15/2022 1539 by Marcia Lutz RN  Outcome: Adequate for Discharge  Flowsheets  Taken 9/15/2022 1300  Electrolytes maintained within normal limits:   Monitor labs and assess patient for signs and symptoms of electrolyte imbalances   Administer electrolyte replacement as ordered   Monitor response to electrolyte replacements, including repeat lab results as appropriate   Instruct patient on fluid and nutrition restrictions as appropriate  Taken 9/15/2022 0915  Electrolytes maintained within normal limits:   Monitor labs and assess patient for signs and symptoms of electrolyte imbalances   Administer electrolyte replacement as ordered   Monitor response to electrolyte replacements, including repeat lab results as appropriate   Instruct patient on fluid and nutrition restrictions as appropriate   Fluid restriction as ordered  9/15/2022 0501 by Roberth León RN  Outcome: Progressing  Flowsheets  Taken 9/15/2022 excess or deficit   Monitor intake, output and patient weight  Taken 9/14/2022 1600 by Artie Ortiz RN  Hemodynamic stability and optimal renal function maintained: Monitor labs and assess for signs and symptoms of volume excess or deficit  Goal: Glucose maintained within prescribed range  9/15/2022 1539 by Adriana Casey RN  Outcome: Adequate for Discharge  Flowsheets  Taken 9/15/2022 1300  Glucose maintained within prescribed range:   Monitor blood glucose as ordered   Assess for signs and symptoms of hyperglycemia and hypoglycemia   Administer ordered medications to maintain glucose within target range   Assess barriers to adequate nutritional intake and initiate nutrition consult as needed   Instruct patient on self management of diabetes and initiate consult as needed  Taken 9/15/2022 0915  Glucose maintained within prescribed range:   Monitor blood glucose as ordered   Assess for signs and symptoms of hyperglycemia and hypoglycemia   Administer ordered medications to maintain glucose within target range   Assess barriers to adequate nutritional intake and initiate nutrition consult as needed   Instruct patient on self management of diabetes and initiate consult as needed  9/15/2022 0501 by Parish Salguero RN  Outcome: Progressing  Flowsheets  Taken 9/15/2022 0000 by Parish Salguero RN  Glucose maintained within prescribed range:   Monitor blood glucose as ordered   Assess for signs and symptoms of hyperglycemia and hypoglycemia  Taken 9/14/2022 1600 by Artie Ortiz RN  Glucose maintained within prescribed range: Monitor blood glucose as ordered     Problem: Hematologic - Adult  Goal: Maintains hematologic stability  9/15/2022 1539 by Adriana Casey RN  Outcome: Adequate for Discharge  Flowsheets  Taken 9/15/2022 1300  Maintains hematologic stability:   Assess for signs and symptoms of bleeding or hemorrhage   Administer blood products/factors as ordered   Monitor labs for bleeding or clotting disorders  Taken 9/15/2022 0915  Maintains hematologic stability:   Assess for signs and symptoms of bleeding or hemorrhage   Monitor labs for bleeding or clotting disorders   Administer blood products/factors as ordered  9/15/2022 0501 by Kathi Neal RN  Outcome: Progressing  Flowsheets  Taken 9/15/2022 0000 by Kathi Neal RN  Maintains hematologic stability: Assess for signs and symptoms of bleeding or hemorrhage  Taken 9/14/2022 1600 by Joselyn Beatty RN  Maintains hematologic stability: Assess for signs and symptoms of bleeding or hemorrhage     Problem: ABCDS Injury Assessment  Goal: Absence of physical injury  9/15/2022 1539 by Moriah Gary RN  Outcome: Adequate for Discharge  Flowsheets (Taken 9/15/2022 0830)  Absence of Physical Injury: Implement safety measures based on patient assessment  9/15/2022 0501 by Kathi Neal RN  Outcome: Progressing     Problem: Skin/Tissue Integrity  Goal: Absence of new skin breakdown  Description: 1. Monitor for areas of redness and/or skin breakdown  2. Assess vascular access sites hourly  3. Every 4-6 hours minimum:  Change oxygen saturation probe site  4. Every 4-6 hours:  If on nasal continuous positive airway pressure, respiratory therapy assess nares and determine need for appliance change or resting period.   9/15/2022 1539 by Moriah Gary RN  Outcome: Adequate for Discharge  9/15/2022 0501 by Kathi Neal RN  Outcome: Progressing

## 2022-09-15 NOTE — PROGRESS NOTES
RN attempted again to call [facility] at 5pm to give report after not receiving any call back from Message I left on [facility] voice mail. No answer at .

## 2022-09-16 ENCOUNTER — HOSPITAL ENCOUNTER (OUTPATIENT)
Age: 87
Setting detail: SPECIMEN
Discharge: HOME | End: 2022-09-16

## 2022-09-16 LAB
ANION GAP SERPL CALCULATED.3IONS-SCNC: 10 MMOL/L (ref 9–17)
BUN BLDV-MCNC: 32 MG/DL (ref 8–23)
BUN/CREAT BLD: 16 (ref 9–20)
CALCIUM SERPL-MCNC: 7.9 MG/DL (ref 8.6–10.4)
CHLORIDE BLD-SCNC: 103 MMOL/L (ref 98–107)
CO2: 23 MMOL/L (ref 20–31)
CREAT SERPL-MCNC: 2.04 MG/DL (ref 0.7–1.2)
GFR AFRICAN AMERICAN: 38 ML/MIN
GFR NON-AFRICAN AMERICAN: 31 ML/MIN
GFR SERPL CREATININE-BSD FRML MDRD: ABNORMAL ML/MIN/{1.73_M2}
GLUCOSE BLD-MCNC: 76 MG/DL (ref 70–99)
HCT VFR BLD CALC: 30.7 % (ref 40.7–50.3)
HEMOGLOBIN: 10.4 G/DL (ref 13–17)
MCH RBC QN AUTO: 30.1 PG (ref 25.2–33.5)
MCHC RBC AUTO-ENTMCNC: 33.9 G/DL (ref 28.4–34.8)
MCV RBC AUTO: 89 FL (ref 82.6–102.9)
NRBC AUTOMATED: 0 PER 100 WBC
PDW BLD-RTO: 17.5 % (ref 11.8–14.4)
PLATELET # BLD: 73 K/UL (ref 138–453)
PMV BLD AUTO: 10.1 FL (ref 8.1–13.5)
POTASSIUM SERPL-SCNC: 4.6 MMOL/L (ref 3.7–5.3)
RBC # BLD: 3.45 M/UL (ref 4.21–5.77)
SODIUM BLD-SCNC: 136 MMOL/L (ref 135–144)
WBC # BLD: 10.8 K/UL (ref 3.5–11.3)

## 2022-09-16 PROCEDURE — 80048 BASIC METABOLIC PNL TOTAL CA: CPT

## 2022-09-16 PROCEDURE — 85027 COMPLETE CBC AUTOMATED: CPT

## 2022-09-16 PROCEDURE — 36415 COLL VENOUS BLD VENIPUNCTURE: CPT

## 2022-09-16 PROCEDURE — P9603 ONE-WAY ALLOW PRORATED MILES: HCPCS

## 2022-09-17 LAB
CULTURE: NORMAL
CULTURE: NORMAL
Lab: NORMAL
Lab: NORMAL
SPECIMEN DESCRIPTION: NORMAL
SPECIMEN DESCRIPTION: NORMAL

## 2022-09-21 ENCOUNTER — HOSPITAL ENCOUNTER (OUTPATIENT)
Age: 87
Setting detail: SPECIMEN
Discharge: HOME | End: 2022-09-21

## 2022-09-21 LAB
ANION GAP SERPL CALCULATED.3IONS-SCNC: 9 MMOL/L (ref 9–17)
BUN BLDV-MCNC: 18 MG/DL (ref 8–23)
BUN/CREAT BLD: 12 (ref 9–20)
CALCIUM SERPL-MCNC: 8.1 MG/DL (ref 8.6–10.4)
CHLORIDE BLD-SCNC: 102 MMOL/L (ref 98–107)
CO2: 23 MMOL/L (ref 20–31)
CREAT SERPL-MCNC: 1.56 MG/DL (ref 0.7–1.2)
GFR AFRICAN AMERICAN: 51 ML/MIN
GFR NON-AFRICAN AMERICAN: 42 ML/MIN
GFR SERPL CREATININE-BSD FRML MDRD: ABNORMAL ML/MIN/{1.73_M2}
GLUCOSE BLD-MCNC: 82 MG/DL (ref 70–99)
HCT VFR BLD CALC: 28.4 % (ref 40.7–50.3)
HEMOGLOBIN: 9.3 G/DL (ref 13–17)
MCH RBC QN AUTO: 30.5 PG (ref 25.2–33.5)
MCHC RBC AUTO-ENTMCNC: 32.7 G/DL (ref 28.4–34.8)
MCV RBC AUTO: 93.1 FL (ref 82.6–102.9)
NRBC AUTOMATED: 0 PER 100 WBC
PDW BLD-RTO: 18.7 % (ref 11.8–14.4)
PLATELET # BLD: 143 K/UL (ref 138–453)
PMV BLD AUTO: 9.9 FL (ref 8.1–13.5)
POTASSIUM SERPL-SCNC: 4.9 MMOL/L (ref 3.7–5.3)
RBC # BLD: 3.05 M/UL (ref 4.21–5.77)
SODIUM BLD-SCNC: 134 MMOL/L (ref 135–144)
WBC # BLD: 7.6 K/UL (ref 3.5–11.3)

## 2022-09-21 PROCEDURE — P9603 ONE-WAY ALLOW PRORATED MILES: HCPCS

## 2022-09-21 PROCEDURE — 36415 COLL VENOUS BLD VENIPUNCTURE: CPT

## 2022-09-21 PROCEDURE — 85027 COMPLETE CBC AUTOMATED: CPT

## 2022-09-21 PROCEDURE — 80048 BASIC METABOLIC PNL TOTAL CA: CPT

## 2022-09-21 ASSESSMENT — ENCOUNTER SYMPTOMS
TROUBLE SWALLOWING: 0
ABDOMINAL DISTENTION: 0
BLOOD IN STOOL: 0
SORE THROAT: 0
CHEST TIGHTNESS: 0
APNEA: 0
RECTAL PAIN: 0
NAUSEA: 0
BACK PAIN: 0
VOICE CHANGE: 0
COUGH: 0
WHEEZING: 0
CHOKING: 0
DIARRHEA: 0
CONSTIPATION: 0
SHORTNESS OF BREATH: 1
ABDOMINAL PAIN: 0

## 2022-09-22 ENCOUNTER — TELEPHONE (OUTPATIENT)
Dept: GASTROENTEROLOGY | Age: 87
End: 2022-09-22

## 2022-09-28 ENCOUNTER — HOSPITAL ENCOUNTER (OUTPATIENT)
Age: 87
Setting detail: SPECIMEN
Discharge: HOME | End: 2022-09-28

## 2022-09-28 LAB
ANION GAP SERPL CALCULATED.3IONS-SCNC: 9 MMOL/L (ref 9–17)
BUN BLDV-MCNC: 14 MG/DL (ref 8–23)
BUN/CREAT BLD: 9 (ref 9–20)
CALCIUM SERPL-MCNC: 8.1 MG/DL (ref 8.6–10.4)
CHLORIDE BLD-SCNC: 104 MMOL/L (ref 98–107)
CO2: 22 MMOL/L (ref 20–31)
CREAT SERPL-MCNC: 1.49 MG/DL (ref 0.7–1.2)
GFR AFRICAN AMERICAN: 54 ML/MIN
GFR NON-AFRICAN AMERICAN: 45 ML/MIN
GFR SERPL CREATININE-BSD FRML MDRD: ABNORMAL ML/MIN/{1.73_M2}
GLUCOSE BLD-MCNC: 75 MG/DL (ref 70–99)
HCT VFR BLD CALC: 30.5 % (ref 40.7–50.3)
HEMOGLOBIN: 9.7 G/DL (ref 13–17)
MCH RBC QN AUTO: 30.8 PG (ref 25.2–33.5)
MCHC RBC AUTO-ENTMCNC: 31.8 G/DL (ref 28.4–34.8)
MCV RBC AUTO: 96.8 FL (ref 82.6–102.9)
NRBC AUTOMATED: 0 PER 100 WBC
PDW BLD-RTO: 19.7 % (ref 11.8–14.4)
PLATELET # BLD: 113 K/UL (ref 138–453)
PMV BLD AUTO: 10.4 FL (ref 8.1–13.5)
POTASSIUM SERPL-SCNC: 4.7 MMOL/L (ref 3.7–5.3)
RBC # BLD: 3.15 M/UL (ref 4.21–5.77)
SODIUM BLD-SCNC: 135 MMOL/L (ref 135–144)
WBC # BLD: 3.7 K/UL (ref 3.5–11.3)

## 2022-09-28 PROCEDURE — 85027 COMPLETE CBC AUTOMATED: CPT

## 2022-09-28 PROCEDURE — 80048 BASIC METABOLIC PNL TOTAL CA: CPT

## 2022-09-28 PROCEDURE — 36415 COLL VENOUS BLD VENIPUNCTURE: CPT

## 2022-09-28 PROCEDURE — P9603 ONE-WAY ALLOW PRORATED MILES: HCPCS

## 2022-10-03 ENCOUNTER — TELEPHONE (OUTPATIENT)
Dept: INFECTIOUS DISEASES | Age: 87
End: 2022-10-03

## 2022-10-03 NOTE — TELEPHONE ENCOUNTER
Pt is scheduled to be discharged from SNF on 10/4/22. Pt is on IV rocephin once daily through 10/19/22. The nurse stated the pts wife reported a large co-pay for the rocephin to be given as an outpatient. She is wondering if there is an alternative medication the pt can take. Please advise.

## 2022-10-04 NOTE — TELEPHONE ENCOUNTER
Nicole from [facility] called questioning if Dr Juice Bass will follow  home care for IV AB-   Informed her of previous message - pt is starting oral   Nicole was never informed, she will talk to [address]

## 2022-10-27 ENCOUNTER — TELEPHONE (OUTPATIENT)
Dept: GASTROENTEROLOGY | Age: 87
End: 2022-10-27

## 2022-10-27 ENCOUNTER — OFFICE VISIT (OUTPATIENT)
Dept: GASTROENTEROLOGY | Age: 87
End: 2022-10-27
Payer: MEDICARE

## 2022-10-27 VITALS
WEIGHT: 171 LBS | SYSTOLIC BLOOD PRESSURE: 113 MMHG | HEIGHT: 69 IN | DIASTOLIC BLOOD PRESSURE: 71 MMHG | BODY MASS INDEX: 25.33 KG/M2

## 2022-10-27 DIAGNOSIS — R97.8 OTHER ABNORMAL TUMOR MARKERS: ICD-10-CM

## 2022-10-27 DIAGNOSIS — R77.2 ELEVATED AFP: Primary | ICD-10-CM

## 2022-10-27 PROCEDURE — 1036F TOBACCO NON-USER: CPT | Performed by: NURSE PRACTITIONER

## 2022-10-27 PROCEDURE — 99214 OFFICE O/P EST MOD 30 MIN: CPT | Performed by: NURSE PRACTITIONER

## 2022-10-27 PROCEDURE — 1123F ACP DISCUSS/DSCN MKR DOCD: CPT | Performed by: NURSE PRACTITIONER

## 2022-10-27 PROCEDURE — G8484 FLU IMMUNIZE NO ADMIN: HCPCS | Performed by: NURSE PRACTITIONER

## 2022-10-27 PROCEDURE — G8417 CALC BMI ABV UP PARAM F/U: HCPCS | Performed by: NURSE PRACTITIONER

## 2022-10-27 PROCEDURE — G8427 DOCREV CUR MEDS BY ELIG CLIN: HCPCS | Performed by: NURSE PRACTITIONER

## 2022-10-27 ASSESSMENT — ENCOUNTER SYMPTOMS
RESPIRATORY NEGATIVE: 1
ALLERGIC/IMMUNOLOGIC NEGATIVE: 1
EYES NEGATIVE: 1
DIARRHEA: 1

## 2022-10-27 NOTE — PROGRESS NOTES
GI CLINIC FOLLOW UP    INTERVAL HISTORY:   No referring provider defined for this encounter. HISTORY OF PRESENT ILLNESS:     Patient being seen for hospital follow-up. Patient was recently admitted to the hospital for altered mental status. Found to have UTI. GI initially consulted due to concerns for colitis on CT. Patient had significant leukocytosis with WBC up to 49,000. Patient denied any abdominal pain, nausea, vomiting. He was managed conservatively. Patient developed elevated LFTs. Had E. coli sepsis. Subsequently he had MRI MRCP to rule out cholangitis. MRCP revealed findings of cirrhosis, splenomegaly. Also noted to have ill-defined T2 hyperintense nodular foci in the left hepatic lobe and right hepatic lobe not clearly simple cyst, presumably due to early abscess. Metastatic disease considered less likely etiology. Motion limited study, limited evaluation of the pelvis. Patient had elevated AFP. Patient was discharged to SNF [facility] 9/15/22. On 10/13/22 patient was admitted to [facility] for generalized weakness. He had repeat CT abd/pelvis that revealed abnormal appearance of the gallbladder, significant intrahepatic ductal dilatation. Concern for cholangiocarcinoma. Liver biopsy not able to be completed by IR. General surgery was consulted and plan for outpatient follow-up however patient has not seen them since discharge. Palliative was also consulted. At present patient is stable. Denies abdominal pain, nausea, vomiting. Reports bowel movements are satisfactory. Abdomen examination benign. Appears mildly jaundiced. Patient comprehends little. Wife speaks broken [place]. She does not seem to understand fully patient's medical issues. She is unable to contribute much to our discussion. She is unsure if  has been in contact with DrJim Via Jaden Gauthier.     Past Medical,Family, and Social History reviewed and does contribute to the patient presentingcondition. Patient's PMH/PSH,SH,PSYCH Hx, MEDs, ALLERGIES, and ROS were all reviewed and updated in the appropriate sections. PAST MEDICAL HISTORY:  Past Medical History:   Diagnosis Date    Chronic back pain     Cirrhosis of liver without ascites ([facility] [address] 9/11/2022    Diabetes mellitus ([facility] [address]        Past Surgical History:   Procedure Laterality Date    BARIATRIC SURGERY      IR INS PICC VAD W SQ PORT GREATER THAN 5  9/14/2022    IR INS PICC VAD W SQ PORT GREATER THAN 5 9/14/2022 [facility]       CURRENT MEDICATIONS:    Current Outpatient Medications:     midodrine (PROAMATINE) 10 MG tablet, Take 1 tablet by mouth 3 times daily (with meals) for 40 doses, Disp: 40 tablet, Rfl: 0    pantoprazole (PROTONIX) 40 MG tablet, Take 1 tablet by mouth every morning (before breakfast), Disp: 30 tablet, Rfl: 3    ALLERGIES:   Allergies   Allergen Reactions    Cefazolin Rash     maculopapular rash over his face, chest, torso, and back. Pt tolerated ceftriaxone during 9/8/2022 admission.         FAMILY HISTORY:       Problem Relation Age of Onset    Heart Attack Sister          SOCIAL HISTORY:   Social History     Socioeconomic History    Marital status:      Spouse name: Not on file    Number of children: Not on file    Years of education: Not on file    Highest education level: Not on file   Occupational History    Not on file   Tobacco Use    Smoking status: Unknown    Smokeless tobacco: Not on file   Substance and Sexual Activity    Alcohol use: Not Currently    Drug use: Not on file    Sexual activity: Not on file   Other Topics Concern    Not on file   Social History Narrative    Not on file     Social Determinants of Health     Financial Resource Strain: Not on file   Food Insecurity: Not on file   Transportation Needs: Not on file   Physical Activity: Not on file   Stress: Not on file   Social Connections: Not on file   Intimate Partner Violence: Not on file   Housing Stability: Not on file REVIEW OF SYSTEMS: A 12-point review of systemswas obtained and pertinent positives and negatives were enumerated above in the history of present illness. All other reviewed systems / symptoms were negative. Review of Systems   Constitutional:  Positive for appetite change (lttle appetite). HENT: Negative. Eyes: Negative. Respiratory: Negative. Cardiovascular: Negative. Gastrointestinal:  Positive for diarrhea. Endocrine: Negative. Genitourinary: Negative. Musculoskeletal: Negative. Skin: Negative. Allergic/Immunologic: Negative. Neurological:  Positive for dizziness (when standing up). Hematological: Negative. Psychiatric/Behavioral: Negative. PHYSICAL EXAMINATION: Vital signs reviewed per the nursing documentation. There were no vitals taken for this visit. There is no height or weight on file to calculate BMI. Physical Exam  Constitutional:       Appearance: Normal appearance. Eyes:      General: No scleral icterus. Pupils: Pupils are equal, round, and reactive to light. Cardiovascular:      Rate and Rhythm: Normal rate and regular rhythm. Heart sounds: Normal heart sounds. Pulmonary:      Effort: Pulmonary effort is normal.      Breath sounds: Normal breath sounds. Abdominal:      General: Bowel sounds are normal. There is no distension. Palpations: Abdomen is soft. There is no mass. Tenderness: There is no abdominal tenderness. There is no guarding. Skin:     General: Skin is warm and dry. Coloration: Skin is not jaundiced. Neurological:      Mental Status: He is alert. Mental status is at baseline. He is disoriented. Motor: Weakness present. Gait: Gait abnormal.      Comments:  In wheelchair           LABORATORY DATA: Reviewed  Lab Results   Component Value Date    WBC 3.7 09/28/2022    HGB 9.7 (L) 09/28/2022    HCT 30.5 (L) 09/28/2022    MCV 96.8 09/28/2022     (L) 09/28/2022     09/28/2022 K 4.7 09/28/2022     09/28/2022    CO2 22 09/28/2022    BUN 14 09/28/2022    CREATININE 1.49 (H) 09/28/2022    LABALBU 1.5 (L) 09/12/2022    BILITOT 3.1 (H) 09/12/2022    ALKPHOS 237 (H) 09/12/2022    AST 49 (H) 09/12/2022    ALT 23 09/12/2022    INR 1.6 09/12/2022         Lab Results   Component Value Date    RBC 3.15 (L) 09/28/2022    HGB 9.7 (L) 09/28/2022    MCV 96.8 09/28/2022    MCH 30.8 09/28/2022    MCHC 31.8 09/28/2022    RDW 19.7 (H) 09/28/2022    MPV 10.4 09/28/2022    BASOPCT 1 09/15/2022    LYMPHSABS 0.93 (L) 09/15/2022    MONOSABS 0.70 09/15/2022    NEUTROABS 7.54 09/15/2022    EOSABS 0.13 09/15/2022    BASOSABS 0.06 09/15/2022         DIAGNOSTIC TESTING:     No results found. IMPRESSION: Mr. Olya Dove is a 80 y.o. male with    Diagnosis Orders   1. Elevated AFP  AFP Tumor Marker      2. Other abnormal tumor markers   AFP Tumor Marker        At present patient appears stable  Currently resides in [facility]. Abdominal examination benign  Appears to be mildly jaundiced. Discussed at length the labs, imaging that were recently completed. Discussed patient needs to follow with PCP and Dr. Luis Lane for possible diagnostic laparoscopic surgery. Dr. Antonio Brewer office number was provided. Patient unsure if he would like any further workup. Advised to discuss with PCP. To follow-up in next few months after seeing PCP and surgical oncologist.        Thank you for allowing me to participate in the care of Mr. Olya Dove. For any further questions please do not hesitate to contact me. I have reviewed and agree with the ROS entered by the MA/ROSA M.          Clare Markham NP-C    [facility]  Office #: [phone]

## 2023-01-11 ENCOUNTER — APPOINTMENT (OUTPATIENT)
Dept: CT IMAGING | Age: 88
End: 2023-01-11
Payer: MEDICARE

## 2023-01-11 ENCOUNTER — HOSPITAL ENCOUNTER (EMERGENCY)
Age: 88
Discharge: HOME | End: 2023-01-11
Attending: EMERGENCY MEDICINE
Payer: MEDICARE

## 2023-01-11 VITALS
RESPIRATION RATE: 18 BRPM | OXYGEN SATURATION: 95 % | TEMPERATURE: 98.4 F | SYSTOLIC BLOOD PRESSURE: 107 MMHG | HEART RATE: 68 BPM | DIASTOLIC BLOOD PRESSURE: 61 MMHG

## 2023-01-11 DIAGNOSIS — S09.90XA INJURY OF HEAD, INITIAL ENCOUNTER: ICD-10-CM

## 2023-01-11 DIAGNOSIS — S00.01XA ABRASION OF SCALP, INITIAL ENCOUNTER: ICD-10-CM
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
## 2023-01-11 DIAGNOSIS — S00.03XA HEMATOMA OF SCALP, INITIAL ENCOUNTER: Primary | ICD-10-CM

## 2023-01-11 PROCEDURE — 99284 EMERGENCY DEPT VISIT MOD MDM: CPT

## 2023-01-11 PROCEDURE — 72125 CT NECK SPINE W/O DYE: CPT

## 2023-01-11 PROCEDURE — 70450 CT HEAD/BRAIN W/O DYE: CPT

## 2023-01-11 ASSESSMENT — ENCOUNTER SYMPTOMS
COUGH: 0
VOMITING: 0
ABDOMINAL PAIN: 0
NAUSEA: 0
BACK PAIN: 0
CHEST TIGHTNESS: 0
SHORTNESS OF BREATH: 0
SORE THROAT: 0
DIARRHEA: 0
VOICE CHANGE: 0
SINUS PAIN: 0
BLOOD IN STOOL: 0
SINUS PRESSURE: 0
CONSTIPATION: 0

## 2023-01-11 ASSESSMENT — PAIN - FUNCTIONAL ASSESSMENT: PAIN_FUNCTIONAL_ASSESSMENT: 0-10

## 2023-01-11 ASSESSMENT — PAIN SCALES - GENERAL: PAINLEVEL_OUTOF10: 5

## 2023-01-11 NOTE — ED PROVIDER NOTES
[facility] ED  eMERGENCY dEPARTMENT eNCOUnter      Pt Name: Marina De La Cruz  MRN: [id number]  [place] 1935  Date of evaluation: 2023  Provider: DIONICIO Martinez [id number]       Chief Complaint   Patient presents with    Head Laceration    Fall         HISTORY OF PRESENT ILLNESS  (Location/Symptom, Timing/Onset, Context/Setting, Quality, Duration, Modifying Factors, Severity.)   Marina De La Cruz is a 80 y.o. male who presents to the emergency department via EMS from [address] with complaint of fall. Patient states he was on the toilet and was preparing to stand up he was leaning to reach for a grab bar when he missed and fell forward. She denies any pain, or LOC. Patient has history of being a poor historian. According to the paperwork from [address] the patient does not take any blood thinners. Nursing Notes were reviewed. ALLERGIES     Cefazolin    CURRENT MEDICATIONS       Previous Medications    MIDODRINE (PROAMATINE) 10 MG TABLET    Take 1 tablet by mouth 3 times daily (with meals) for 40 doses    PANTOPRAZOLE (PROTONIX) 40 MG TABLET    Take 1 tablet by mouth every morning (before breakfast)       PAST MEDICAL HISTORY         Diagnosis Date    Chronic back pain     Cirrhosis of liver without ascites ([address] 2022    Diabetes mellitus ([facility] [address]        SURGICAL HISTORY           Procedure Laterality Date    BARIATRIC SURGERY      IR INS PICC VAD W SQ PORT GREATER THAN 5  2022    IR INS PICC VAD W SQ PORT GREATER THAN 5 2022 [facility]         FAMILY HISTORY           Problem Relation Age of Onset    Heart Attack Sister      Family Status   Relation Name Status    Sister          SOCIAL HISTORY      reports that he has an unknown smoking status. He has never used smokeless tobacco. He reports that he does not currently use alcohol.     REVIEW OF SYSTEMS    (2-9 systems for level 4, 10 or more for level 5)   Review of Systems   Constitutional:  Negative for appetite change, chills, diaphoresis, fatigue and fever. HENT:  Negative for congestion, ear pain, sinus pressure, sinus pain, sore throat and voice change. Respiratory:  Negative for cough, chest tightness and shortness of breath. Cardiovascular:  Negative for chest pain, palpitations and leg swelling. Gastrointestinal:  Negative for abdominal pain, blood in stool, constipation, diarrhea, nausea and vomiting. Genitourinary:  Negative for dysuria, flank pain, frequency, hematuria and urgency. Musculoskeletal:  Negative for arthralgias, back pain, myalgias and neck pain. Skin:  Positive for wound (left scalp). Negative for rash. Neurological:  Negative for dizziness, speech difficulty, weakness, light-headedness, numbness and headaches. Except as noted above the remainder of the review of systems was reviewed and negative. PHYSICAL EXAM    (up to 7 for level 4, 8 or more for level 5)     ED Triage Vitals [01/11/23 1746]   BP Temp Temp Source Heart Rate Resp SpO2 Height Weight   107/61 98.4 °F (36.9 °C) Oral 68 18 95 % -- --     Physical Exam  Vitals and nursing note reviewed. Constitutional:       General: He is not in acute distress. Appearance: Normal appearance. He is not ill-appearing, toxic-appearing or diaphoretic. HENT:      Head: Normocephalic and atraumatic. Right Ear: External ear normal.      Left Ear: External ear normal.      Nose: Nose normal. No congestion or rhinorrhea. Mouth/Throat:      Mouth: Mucous membranes are moist.      Pharynx: Oropharynx is clear. Eyes:      General: No scleral icterus. Right eye: No discharge. Left eye: No discharge. Extraocular Movements: Extraocular movements intact. Pupils: Pupils are equal, round, and reactive to light. Comments: Icteric sclera bilaterally     Cardiovascular:      Rate and Rhythm: Normal rate and regular rhythm. Pulses: Normal pulses. Heart sounds: Normal heart sounds. No murmur heard. Pulmonary:      Effort: Pulmonary effort is normal. No respiratory distress. Breath sounds: Normal breath sounds. No stridor. No wheezing, rhonchi or rales. Chest:      Chest wall: No tenderness. Abdominal:      General: Abdomen is flat. There is no distension. Palpations: Abdomen is soft. Tenderness: There is no abdominal tenderness. There is no right CVA tenderness, left CVA tenderness, guarding or rebound. Musculoskeletal:         General: No deformity or signs of injury. Normal range of motion. Cervical back: Normal range of motion and neck supple. Right lower leg: No edema. Left lower leg: No edema. Lymphadenopathy:      Cervical: No cervical adenopathy. Skin:     General: Skin is warm and dry. Capillary Refill: Capillary refill takes less than 2 seconds. Coloration: Skin is not jaundiced or pale. Findings: Abrasion present. No laceration or rash. Neurological:      General: No focal deficit present. Mental Status: He is alert and oriented to person, place, and time. Cranial Nerves: No cranial nerve deficit. Sensory: No sensory deficit. Motor: No weakness. Gait: Gait normal.   Psychiatric:         Mood and Affect: Mood normal.         Behavior: Behavior normal.         Thought Content:  Thought content normal.         DIAGNOSTIC RESULTS       RADIOLOGY:   Non-plain film images such as CT, Ultrasound and MRI are read by the radiologist. [facility] radiographic images are visualized and preliminarily interpreted by the emergency physician with the below findings:    Interpretation per the Radiologist below, if available at the time of this note:    CT HEAD WO CONTRAST    Result Date: 1/11/2023  EXAMINATION: CT OF THE HEAD WITHOUT CONTRAST  1/11/2023 6:18 pm TECHNIQUE: CT of the head was performed without the administration of intravenous contrast. Automated exposure control, iterative reconstruction, and/or weight based adjustment of the mA/kV was utilized to reduce the radiation dose to as low as reasonably achievable. COMPARISON: 09/08/2022 HISTORY: ORDERING SYSTEM PROVIDED HISTORY: fall TECHNOLOGIST PROVIDED HISTORY: fall Decision Support Exception - unselect if not a suspected or confirmed emergency medical condition->Emergency Medical Condition (MA) Reason for Exam: S/p fall. Head lac. FINDINGS: BRAIN/VENTRICLES: The ventricles and sulci are diffusely enlarged. Low attenuation is seen in the periventricular and subcortical white matter. No acute intracranial hemorrhage or acute infarct is identified ORBITS: The visualized portion of the orbits demonstrate no acute abnormality. SINUSES: The visualized paranasal sinuses and mastoid air cells demonstrate no acute abnormality. SOFT TISSUES/SKULL:  No acute abnormality of the visualized skull or soft tissues. No acute intracranial abnormality. CT CERVICAL SPINE WO CONTRAST    Result Date: 1/11/2023  EXAMINATION: CT OF THE CERVICAL SPINE WITHOUT CONTRAST 1/11/2023 6:18 pm TECHNIQUE: CT of the cervical spine was performed without the administration of intravenous contrast. Multiplanar reformatted images are provided for review. Automated exposure control, iterative reconstruction, and/or weight based adjustment of the mA/kV was utilized to reduce the radiation dose to as low as reasonably achievable. COMPARISON: None. HISTORY: ORDERING SYSTEM PROVIDED HISTORY: fall TECHNOLOGIST PROVIDED HISTORY: fall Decision Support Exception - unselect if not a suspected or confirmed emergency medical condition->Emergency Medical Condition (MA) Reason for Exam: S/p fall. Head lac. FINDINGS: BONES/ALIGNMENT: There is no acute fracture or traumatic malalignment. DEGENERATIVE CHANGES: Multilevel degenerative changes. Large bridging anterior osteophytes. SOFT TISSUES: There is no prevertebral soft tissue swelling.      No acute abnormality of the cervical spine. LABS:  Labs Reviewed - No data to display    All other labs were within normal range or not returned as of this dictation. EMERGENCY DEPARTMENT COURSE and DIFFERENTIAL DIAGNOSIS/MDM:   Vitals:    Vitals:    01/11/23 1746   BP: 107/61   Pulse: 68   Resp: 18   Temp: 98.4 °F (36.9 °C)   TempSrc: Oral   SpO2: 95%       MEDICATIONS GIVEN IN THE ED:  Medications - No data to display    CLINICAL DECISION MAKING:  The patient presented alert with a nontoxic appearance and was seen in conjunction with Dr. Gerard Naranjo. Patient presents to the ER via EMS from [address] with complaint of a fall. Patient states he was on the toilet preparing to stand up he was leaning forward to grab the handle when he fell forward. Patient's story is consistent with a mechanical fall versus a possible syncopal episode. Patient states he was not straining when the fall occurred and that he was done using the restroom preparing to stand up. The patient arrives with a hematoma to the left scalp, no active bleeding. Abrasion noted. Patient denies any pain to the area. Patient denies any loss of consciousness, however patient has history of being a poor historian. DDx include contusion, abrasion, hematoma, intracranial hemorrhage, syncopal event      I will order CT head and neck without contrast.    CT head and neck is negative. Patient is appropriate for discharge back to hospice facility. Hematoma to left scalp does not require any intervention, there is no active bleeding. The patient was involved in his/her plan of care. The tests that were ordered were discussed with the patient. Any medications that may have been ordered were discussed with the patient. I have reviewed the patient's previous medical records. Labs and imaging were reviewed. Evaluation and treatment course in the ED, and plan of care upon discharge was discussed in length with the patient.  Patient had no further questions prior to being discharged and was instructed to return to the ED for new or worsening symptoms. Care was provided during an unprecedented national emergency due to the novel coronavirus, Covid-19. Code status:       [facility]  #415 - Emergency Medicine: Utilization of CT for Minor Blunt Head Trauma (Adult)   [] Patient has one or more of the following conditions that are excluded from the measure (select all that apply):    [] Patient has ventricular shunt   [] Patient has brain tumor    [] Patient is pregnant   [] Patient has multi-system trauma    [] Patient taking an antiplatelet medication (excluding aspirin)   [] Head CT not ordered by emergency care clinician   [] Head CT ordered for reasons other than trauma      [x] Minor blunt head trauma.  Head CT was ordered by an emergency clinician for trauma because: [[facility]]:  Reasons:  [x] Patient is 72 or older  [] Patient GCS < 15  [] Patient has focal neurologic deficit  [] Patient has severe headache  [] Patient is vomiting  [] Severe/dangerous mechanism of injury was identified (select one or more):  [] MVA with: patient ejection, death of another passenger, rollover, speed > 40mph, airbag deployment,  or passenger on ATV or motorcycle  [] Pedestrian or bicyclist without helmet: struck my motorized vehicle, in bicycle crash  [] Fall > 3 feet or 5 stairs  [] Head struck by high-impact object (hammer, baseball, baseball bat, heavy object such as falling brick)  [] Other:  (ie. assault description)  [] Patient has physical signs of basilar skull fracture present (including hemotympanum, raccoon eyes, CSF leakage from ear or nose, Marin's sign)  [] Patient suspected of taking anticoagulant medication  [] Patient has thrombocytopenia  [] Patient has coagulopathy   [] Patient has loss of consciousness and (must select one of the following):  [] Headache  [] Alcohol/drug intoxication  [] Evidence of trauma above the clavicles  [] Age 61 or older  [] Post-traumatic seizure         The patient presents with a closed head injury. The patient is neurologically intact. The presentation does not suggest a serious head injury. Signs and symptoms of a serious head injury have been discussed with the patient and caregiver, who will be vigilant for these. Concerns of repeat head injury have also been discussed. The patient has been observed adequately in the ED. Pt has been instructed to return to the ED if symptoms do not go away or worsen or change in any way. The patient understands that at this time there is no evidence for a more malignant underlying process, but the patient also understands that early in the process of an illness or injury, an emergency department workup can be falsely reassuring. Routine discharge counseling was given, and the patient understands that worsening, changing or persistent symptoms should prompt an immediate call or follow up with their primary physician or return to the emergency department. The importance of appropriate follow up was also discussed. I have reviewed the disposition diagnosis with the patient and or their family/guardian. I have answered their questions and given discharge instructions. They voiced understanding of these instructions and did not have any further questions or complaints. FINAL IMPRESSION      1. Hematoma of scalp, initial encounter    2.  Abrasion of scalp, initial encounter            Problem List  Patient Active Problem List   Diagnosis Code    Acute kidney injury superimposed on CKD ([facility] [address] N17.9, N18.9    Hypoglycemia E16.2    Thrombocytopenia (HCC) D69.6    Leukocytosis D72.829    Hyponatremia E87.1    Hypokalemia E87.6    Hypotension I95.9    Colitis K52.9    Acute cystitis with hematuria N30.01    Proctitis K62.89    Cholelithiasis K80.20    Hypomagnesemia E83.42    Altered mental status R41.82    Elevated LFTs R79.89    Abdominal pain R10.9    Cirrhosis of liver without ascites (HCC) K74.60    CKD (chronic kidney disease) N18.9    E coli bacteremia R78.81, B96.20    E. coli UTI N39.0, B96.20         DISPOSITION/PLAN   DISPOSITION        PATIENT REFERRED TO:   No follow-up provider specified.     DISCHARGE MEDICATIONS:     New Prescriptions    No medications on file           (Please note that portions of this note were completed with a voice recognition program.  Efforts were made to edit the dictations but occasionally words are mis-transcribed.)    DIONICIO Starr CNP, APRN - CNP  01/11/23 8074

## 2023-01-11 NOTE — DISCHARGE INSTRUCTIONS
PLEASE RETURN TO THE EMERGENCY DEPARTMENT IMMEDIATELY if your symptoms worsen in anyway or in 8-12 hours if not improved for re-evaluation. You should immediately return to the ER for symptoms such as new or worsening pain, fever, visual or hearing changes, stiff neck, rash, a feeling of passing out, chest pain, shortness of breath, persistent nausea and/or vomiting, numbness or weakness to the arms or legs, coolness or color change of the arms or legs. You should avoid contact sports or activities where you might hit your head for a minimum of 1 week. Take your medication as indicated and prescribed. If you are given an antibiotic then, make sure you get the prescription filled and take the antibiotics until finished. Please understand that at this time there is no evidence for a more serious underlying process, but that early in the process of an illness or injury, an emergency department workup can be falsely reassuring. You should contact your family doctor within the next 24 hours for a follow up appointment    Leonel Carreon!!!    From [address] and [facility] Emergency [facility]    On behalf of the Emergency Department staff at [address], I would like to thank you for giving us the opportunity to address your health care needs and concerns. We hope that during your visit, our service was delivered in a professional and caring manner. Please keep [address] in mind as we walk with you down the path to your own personal wellness. Please expect an automated text message or email from us so we can ask a few questions about your health and progress. Based on your answers, a clinician may call you back to offer help and instructions. Please understand that early in the process of an illness or injury, an emergency department workup can be falsely reassuring. If you notice any worsening, changing or persistent symptoms please call your family doctor or return to the ER immediately.

## 2023-01-11 NOTE — ED NOTES
Pt fell at SNF he has a 2 inch lac to left back of head. He is alert. He is a DNR-CC but no paperwork is provided. He denies LOC.      Viet Rock RN  01/11/23 3852

## 2023-01-12 NOTE — ED NOTES
Report given to [id number] [facility] present to transport patient back to facility. Patient AOx4 on discharge and stable.       Nilda Aguirre RN  01/11/23 7138

## 2023-01-12 NOTE — ED NOTES
Patient updated on transportation ETA. Warm blanket provided. Attempt made to call nurse at facility with no answer.      Gaby Gutierrez RN  01/11/23 Geraldo Peres RN  01/11/23 2025

## 2023-01-12 NOTE — ED PROVIDER NOTES
eMERGENCY dEPARTMENT eNCOUnter   Independent Attestation     Pt Name: Jc Beatty  MRN: [id number]  [place] 1935  Date of evaluation: 1/11/23     Jc Beatty is a 80 y.o. male with CC: Head Laceration and Fall        This visit was performed by both a physician and an APC. I performed all aspects of the MDM as documented. The care is provided during an unprecedented national emergency due to the novel coronavirus, COVID 19.     Jaclyn Clement MD  Attending Emergency Physician           Mark Owens MD  01/11/23 7977

## 2023-01-12 NOTE — ED NOTES
340 Ozzie Hammer RN at bedside, updated on negative workup and pending transport to [facility].       Denisa Madera RN  01/11/23 3970

## 2023-08-20 NOTE — TELEPHONE ENCOUNTER
Called and talked with Kary Colorado at the [facility] and scheduled for 10/27/22 11:15 am at the [place] office. Wheelchair, Black and cheikh Us, and wife Nuzhat Clancy may be with him. Topical triamcinolone, 0.1% ointment twice a day on his arms and trunks for two weeks at a time. Take a one week break between uses and repeat for another two weeks if rash is still present do not apply triamcinolone to face or groin. Tacrolimus which he can use twice a day until improved and SPF to help prevent further dyspigmentation. I would also recommend that the patient moisturizes with a thick emollient, 2 to 3 times a day, if possible and especially after getting out of the bath or shower.

## 2024-09-26 NOTE — TELEPHONE ENCOUNTER
Jeannie from [address] [place] [address] stating she needs clarification on appt form today.  [phone]
Writer called back at [phone]. Writer spoke with bladimir, was told they were not sure who gianni was and was not sure what was needed. Writer informed them if they have questions to give the office a call back.
No
no